# Patient Record
Sex: FEMALE | Race: WHITE | Employment: OTHER | URBAN - METROPOLITAN AREA
[De-identification: names, ages, dates, MRNs, and addresses within clinical notes are randomized per-mention and may not be internally consistent; named-entity substitution may affect disease eponyms.]

---

## 2020-11-19 ENCOUNTER — HOSPITAL ENCOUNTER (OUTPATIENT)
Age: 70
Setting detail: SPECIMEN
Discharge: HOME | End: 2020-11-19
Payer: COMMERCIAL

## 2020-11-20 LAB
IGA: 281 MG/DL (ref 70–400)
TISSUE TRANSGLUTAMINASE ANTIBODY IGG: 0.7 U/ML
TISSUE TRANSGLUTAMINASE IGA: 0.6 U/ML

## 2022-02-22 ENCOUNTER — OFFICE VISIT (OUTPATIENT)
Dept: FAMILY MEDICINE CLINIC | Age: 72
End: 2022-02-22
Payer: MEDICARE

## 2022-02-22 VITALS
RESPIRATION RATE: 16 BRPM | TEMPERATURE: 98.2 F | SYSTOLIC BLOOD PRESSURE: 126 MMHG | BODY MASS INDEX: 24.14 KG/M2 | DIASTOLIC BLOOD PRESSURE: 74 MMHG | WEIGHT: 131.2 LBS | HEIGHT: 62 IN | HEART RATE: 61 BPM | OXYGEN SATURATION: 98 %

## 2022-02-22 DIAGNOSIS — H40.9 GLAUCOMA OF BOTH EYES, UNSPECIFIED GLAUCOMA TYPE: ICD-10-CM

## 2022-02-22 DIAGNOSIS — I10 PRIMARY HYPERTENSION: ICD-10-CM

## 2022-02-22 DIAGNOSIS — E78.2 MIXED HYPERLIPIDEMIA: ICD-10-CM

## 2022-02-22 DIAGNOSIS — M19.042 ARTHRITIS OF BOTH HANDS: ICD-10-CM

## 2022-02-22 DIAGNOSIS — Z91.81 AT HIGH RISK FOR FALLS: ICD-10-CM

## 2022-02-22 DIAGNOSIS — Z87.891 HISTORY OF TOBACCO ABUSE: ICD-10-CM

## 2022-02-22 DIAGNOSIS — M19.041 ARTHRITIS OF BOTH HANDS: ICD-10-CM

## 2022-02-22 DIAGNOSIS — N18.9 CHRONIC KIDNEY DISEASE, UNSPECIFIED CKD STAGE: ICD-10-CM

## 2022-02-22 DIAGNOSIS — Z11.59 NEED FOR HEPATITIS C SCREENING TEST: ICD-10-CM

## 2022-02-22 DIAGNOSIS — J45.30 MILD PERSISTENT ASTHMA WITHOUT COMPLICATION: Primary | ICD-10-CM

## 2022-02-22 PROCEDURE — 1123F ACP DISCUSS/DSCN MKR DOCD: CPT | Performed by: NURSE PRACTITIONER

## 2022-02-22 PROCEDURE — 4040F PNEUMOC VAC/ADMIN/RCVD: CPT | Performed by: NURSE PRACTITIONER

## 2022-02-22 PROCEDURE — 1036F TOBACCO NON-USER: CPT | Performed by: NURSE PRACTITIONER

## 2022-02-22 PROCEDURE — G8484 FLU IMMUNIZE NO ADMIN: HCPCS | Performed by: NURSE PRACTITIONER

## 2022-02-22 PROCEDURE — G8400 PT W/DXA NO RESULTS DOC: HCPCS | Performed by: NURSE PRACTITIONER

## 2022-02-22 PROCEDURE — G8420 CALC BMI NORM PARAMETERS: HCPCS | Performed by: NURSE PRACTITIONER

## 2022-02-22 PROCEDURE — 3017F COLORECTAL CA SCREEN DOC REV: CPT | Performed by: NURSE PRACTITIONER

## 2022-02-22 PROCEDURE — 99204 OFFICE O/P NEW MOD 45 MIN: CPT | Performed by: NURSE PRACTITIONER

## 2022-02-22 PROCEDURE — G8427 DOCREV CUR MEDS BY ELIG CLIN: HCPCS | Performed by: NURSE PRACTITIONER

## 2022-02-22 PROCEDURE — 1090F PRES/ABSN URINE INCON ASSESS: CPT | Performed by: NURSE PRACTITIONER

## 2022-02-22 RX ORDER — BUDESONIDE 3 MG/1
CAPSULE, COATED PELLETS ORAL
COMMUNITY
Start: 2022-02-07

## 2022-02-22 RX ORDER — OMEPRAZOLE 20 MG/1
CAPSULE, DELAYED RELEASE ORAL
COMMUNITY
Start: 2022-01-17

## 2022-02-22 RX ORDER — PRIMIDONE 50 MG/1
TABLET ORAL
COMMUNITY
Start: 2022-01-20

## 2022-02-22 RX ORDER — TAFLUPROST 0 MG/.3ML
1 SOLUTION/ DROPS OPHTHALMIC NIGHTLY
COMMUNITY

## 2022-02-22 RX ORDER — LISINOPRIL AND HYDROCHLOROTHIAZIDE 12.5; 1 MG/1; MG/1
TABLET ORAL
COMMUNITY
Start: 2021-11-29

## 2022-02-22 RX ORDER — NETARSUDIL 0.2 MG/ML
1 SOLUTION/ DROPS OPHTHALMIC; TOPICAL DAILY
COMMUNITY

## 2022-02-22 RX ORDER — TRIAMCINOLONE ACETONIDE 0.25 MG/G
CREAM TOPICAL
Qty: 80 G | Refills: 0 | Status: CANCELLED | OUTPATIENT
Start: 2022-02-22

## 2022-02-22 SDOH — ECONOMIC STABILITY: TRANSPORTATION INSECURITY
IN THE PAST 12 MONTHS, HAS LACK OF TRANSPORTATION KEPT YOU FROM MEETINGS, WORK, OR FROM GETTING THINGS NEEDED FOR DAILY LIVING?: NO

## 2022-02-22 SDOH — ECONOMIC STABILITY: FOOD INSECURITY: WITHIN THE PAST 12 MONTHS, THE FOOD YOU BOUGHT JUST DIDN'T LAST AND YOU DIDN'T HAVE MONEY TO GET MORE.: NEVER TRUE

## 2022-02-22 SDOH — ECONOMIC STABILITY: FOOD INSECURITY: WITHIN THE PAST 12 MONTHS, YOU WORRIED THAT YOUR FOOD WOULD RUN OUT BEFORE YOU GOT MONEY TO BUY MORE.: NEVER TRUE

## 2022-02-22 SDOH — ECONOMIC STABILITY: TRANSPORTATION INSECURITY
IN THE PAST 12 MONTHS, HAS THE LACK OF TRANSPORTATION KEPT YOU FROM MEDICAL APPOINTMENTS OR FROM GETTING MEDICATIONS?: NO

## 2022-02-22 ASSESSMENT — PATIENT HEALTH QUESTIONNAIRE - PHQ9
SUM OF ALL RESPONSES TO PHQ QUESTIONS 1-9: 0
SUM OF ALL RESPONSES TO PHQ9 QUESTIONS 1 & 2: 0
SUM OF ALL RESPONSES TO PHQ QUESTIONS 1-9: 0
SUM OF ALL RESPONSES TO PHQ QUESTIONS 1-9: 0
1. LITTLE INTEREST OR PLEASURE IN DOING THINGS: 0
2. FEELING DOWN, DEPRESSED OR HOPELESS: 0
SUM OF ALL RESPONSES TO PHQ QUESTIONS 1-9: 0

## 2022-02-22 ASSESSMENT — ENCOUNTER SYMPTOMS
DIARRHEA: 0
CHEST TIGHTNESS: 0
RHINORRHEA: 0
VOMITING: 0
EYE DISCHARGE: 0
CONSTIPATION: 0
COUGH: 0
COLOR CHANGE: 1
ABDOMINAL PAIN: 1
SHORTNESS OF BREATH: 0

## 2022-02-22 ASSESSMENT — SOCIAL DETERMINANTS OF HEALTH (SDOH): HOW HARD IS IT FOR YOU TO PAY FOR THE VERY BASICS LIKE FOOD, HOUSING, MEDICAL CARE, AND HEATING?: NOT HARD AT ALL

## 2022-02-22 NOTE — PROGRESS NOTES
Veronica Neighbours [address] [facility], [facility] [place]. [facility] [address]  Dept: [phone]  Dept Fax: [phone]    Visit type: New patient    Reason for Visit: Establish Care (New patient- Prevously seen Petr Kennedy) and Pruritis (on palms of hands- anytime hands are exposed to water- ice helps relieve itching)         Assessment and Plan       1. Mild persistent asthma without complication   Controlled with singulair, has not needed inhaler   2. Chronic kidney disease, unspecified CKD stage   Due for labs  3. Mixed hyperlipidemia   Due for labs, is not taking a statin   -     CBC with Auto Differential; Future  -     Comprehensive Metabolic Panel, Fasting; Future  -     Hemoglobin A1C; Future  -     Lipid Panel; Future  -     Magnesium; Future  -     Vitamin D 25 Hydroxy; Future  -     TSH with Reflex; Future  4. Primary hypertension   Controlled, continue current medication   -     CBC with Auto Differential; Future  -     Comprehensive Metabolic Panel, Fasting; Future  -     Hemoglobin A1C; Future  -     Lipid Panel; Future  -     Magnesium; Future  -     Vitamin D 25 Hydroxy; Future  -     TSH with Reflex; Future  5. Need for hepatitis C screening test  -     Hepatitis C Antibody; Future  6. At high risk for falls  7. Arthritis of both hands   Concern rarely happens and is resolved with ice, discusse alternative diagnosis such as raynauds   8. Glaucoma of both eyes, unspecified glaucoma type   Is following with speciliast  9. History of tobacco abuse  10. Colitis   Is following with Ger[facility]    Signed ROLANDO need to get mammogram and colonoscopy on file as well as vaccinations from [facility]      Return in about 6 months (around 8/22/2022) for AWV .        Subjective       Here to establish care  Previous PCP = korey    Occupation - retired teaching, [place] and . Mani Kang 17 in [address] and veronica Johnson - takes part in that    Is going to try out for the [employer] family     Asthma  Onset for constipation, diarrhea (resolved, but does have frequency) and vomiting. Genitourinary: Negative for difficulty urinating and hematuria. Musculoskeletal: Positive for arthralgias and myalgias. Skin: Positive for color change (intermittent to palms of hands). Negative for rash. Neurological: Negative for dizziness, weakness, numbness and headaches. Psychiatric/Behavioral: The patient is not nervous/anxious. Allergies   Allergen Reactions    Keflex [Cephalexin] Hives    Theophyllines Nausea And Vomiting       Outpatient Medications Prior to Visit   Medication Sig Dispense Refill    budesonide (ENTOCORT EC) 3 MG extended release capsule       lisinopril-hydroCHLOROthiazide (PRINZIDE;ZESTORETIC) 10-12.5 MG per tablet       omeprazole (PRILOSEC) 20 MG delayed release capsule       primidone (MYSOLINE) 50 MG tablet       Tafluprost, PF, (ZIOPTAN) 0.0015 % SOLN Apply 1 drop to eye nightly      Netarsudil Dimesylate (RHOPRESSA) 0.02 % SOLN Apply 1 drop to eye daily Right eye      Omega-3 Fatty Acids (FISH OIL PO) Take by mouth      Multiple Vitamins-Minerals (THERAPEUTIC MULTIVITAMIN-MINERALS) tablet Take 1 tablet by mouth daily.  montelukast (SINGULAIR) 10 MG tablet Take 10 mg by mouth daily.  dorzolamide (TRUSOPT) 2 % ophthalmic solution Place 1 drop into both eyes 2 times daily       mometasone-formoterol (DULERA) 200-5 MCG/ACT inhaler Inhale 1 puff into the lungs nightly (Patient not taking: Reported on 2/22/2022)      Albuterol Sulfate (VENTOLIN HFA IN) Inhale  into the lungs 4 times daily as needed. (Patient not taking: Reported on 2/22/2022)      Lysine 500 MG CAPS Take  by mouth daily as needed. (Patient not taking: Reported on 2/22/2022)      latanoprost (XALATAN) 0.005 % ophthalmic solution Place 1 drop into both eyes nightly. (Patient not taking: Reported on 2/22/2022)       No facility-administered medications prior to visit.         Past Medical History:   Diagnosis Date    Asthma     Chronic back pain     Chronic kidney disease     cysts on her lt kidney    Colitis     Glaucoma     2011    Hyperlipidemia     Hypertension         Social History     Tobacco Use    Smoking status: Former Smoker     Packs/day: 0.50     Years: 2.00     Pack years: 1.00     Start date:      Quit date:      Years since quittin.1    Smokeless tobacco: Never Used   Substance Use Topics    Alcohol use: Yes     Comment: daily glass of wine        Past Surgical History:   Procedure Laterality Date    BREAST SURGERY      needle biopsies     SECTION      x2    HYSTERECTOMY      SEPTOPLASTY  2016    TONSILLECTOMY      TURBINATE RESECTION  2016       Family History   Problem Relation Age of Onset    High Cholesterol Mother     Hypertension Father     Stroke Father     Alcohol Abuse Sister         liver and kidney failure       Objective       /74 (Site: Right Upper Arm, Position: Sitting, Cuff Size: Medium Adult) Comment: manual  Pulse 61   Temp 98.2 °F (36.8 °C) (Temporal)   Resp 16   Ht 5' 2.25\" (1.581 m)   Wt 131 lb 3.2 oz (59.5 kg)   SpO2 98%   BMI 23.80 kg/m²   Physical Exam  Vitals reviewed. Constitutional:       Appearance: She is well-developed. HENT:      Head: Normocephalic and atraumatic. Right Ear: External ear normal.      Left Ear: External ear normal.   Eyes:      Conjunctiva/sclera: Conjunctivae normal.      Comments: glasses   Neck:      Thyroid: No thyromegaly. Trachea: Trachea normal.   Cardiovascular:      Rate and Rhythm: Normal rate and regular rhythm. Heart sounds: Normal heart sounds. No murmur heard. No friction rub. No gallop. Pulmonary:      Effort: Pulmonary effort is normal.      Breath sounds: Normal breath sounds. Abdominal:      General: Bowel sounds are normal.      Palpations: Abdomen is soft. Tenderness: There is no abdominal tenderness.    Musculoskeletal:         General: Normal range of motion. Cervical back: Normal range of motion and neck supple. No spinous process tenderness. Skin:     General: Skin is warm and dry. Findings: No erythema or rash. Neurological:      Mental Status: She is alert and oriented to person, place, and time. Psychiatric:         Speech: Speech normal.         Behavior: Behavior normal.         Thought Content: Thought content normal.           Data Reviewed and Summarized       Labs:     Imaging/Testing:        SONYA Grossman CNP        On the basis of positive falls risk screening, assessment and plan is as follows: home safety tips provided.

## 2022-04-22 ENCOUNTER — TELEPHONE (OUTPATIENT)
Dept: FAMILY MEDICINE CLINIC | Age: 72
End: 2022-04-22

## 2022-04-22 DIAGNOSIS — B00.1 RECURRENT COLD SORES: Primary | ICD-10-CM

## 2022-04-22 NOTE — TELEPHONE ENCOUNTER
PT. Called clinic in regards to requesting an order for Acyclovir to be ordered for flare ups. Please Advise.

## 2022-04-25 RX ORDER — VALACYCLOVIR HYDROCHLORIDE 1 G/1
2000 TABLET, FILM COATED ORAL 2 TIMES DAILY
Qty: 4 TABLET | Refills: 3 | Status: SHIPPED | OUTPATIENT
Start: 2022-04-25 | End: 2022-08-22

## 2022-06-09 ENCOUNTER — OFFICE VISIT (OUTPATIENT)
Dept: FAMILY MEDICINE CLINIC | Age: 72
End: 2022-06-09
Payer: MEDICARE

## 2022-06-09 VITALS
WEIGHT: 126 LBS | BODY MASS INDEX: 22.86 KG/M2 | OXYGEN SATURATION: 98 % | RESPIRATION RATE: 16 BRPM | SYSTOLIC BLOOD PRESSURE: 125 MMHG | HEART RATE: 70 BPM | DIASTOLIC BLOOD PRESSURE: 74 MMHG | TEMPERATURE: 98.4 F

## 2022-06-09 DIAGNOSIS — J45.21 MILD INTERMITTENT ASTHMATIC BRONCHITIS WITH ACUTE EXACERBATION: Primary | ICD-10-CM

## 2022-06-09 PROBLEM — L85.1 KERATODERMA, ACQUIRED: Status: ACTIVE | Noted: 2018-05-23

## 2022-06-09 PROBLEM — L57.0 ACTINIC KERATOSIS: Status: ACTIVE | Noted: 2020-07-30

## 2022-06-09 PROBLEM — M85.80 OSTEOPENIA: Status: ACTIVE | Noted: 2022-06-09

## 2022-06-09 PROBLEM — H40.9 GLAUCOMA: Status: ACTIVE | Noted: 2022-06-09

## 2022-06-09 PROCEDURE — 1036F TOBACCO NON-USER: CPT | Performed by: NURSE PRACTITIONER

## 2022-06-09 PROCEDURE — G8427 DOCREV CUR MEDS BY ELIG CLIN: HCPCS | Performed by: NURSE PRACTITIONER

## 2022-06-09 PROCEDURE — G8400 PT W/DXA NO RESULTS DOC: HCPCS | Performed by: NURSE PRACTITIONER

## 2022-06-09 PROCEDURE — 99213 OFFICE O/P EST LOW 20 MIN: CPT | Performed by: NURSE PRACTITIONER

## 2022-06-09 PROCEDURE — G8420 CALC BMI NORM PARAMETERS: HCPCS | Performed by: NURSE PRACTITIONER

## 2022-06-09 PROCEDURE — 3017F COLORECTAL CA SCREEN DOC REV: CPT | Performed by: NURSE PRACTITIONER

## 2022-06-09 PROCEDURE — 1090F PRES/ABSN URINE INCON ASSESS: CPT | Performed by: NURSE PRACTITIONER

## 2022-06-09 PROCEDURE — 1123F ACP DISCUSS/DSCN MKR DOCD: CPT | Performed by: NURSE PRACTITIONER

## 2022-06-09 RX ORDER — ALBUTEROL SULFATE 90 UG/1
2 AEROSOL, METERED RESPIRATORY (INHALATION)
COMMUNITY
End: 2022-08-22

## 2022-06-09 RX ORDER — B-COMPLEX WITH VITAMIN C
TABLET ORAL
COMMUNITY
End: 2022-08-22

## 2022-06-09 RX ORDER — METHYLPREDNISOLONE 4 MG/1
TABLET ORAL
Qty: 1 KIT | Refills: 0 | Status: SHIPPED | OUTPATIENT
Start: 2022-06-09 | End: 2022-06-15

## 2022-06-09 RX ORDER — AZITHROMYCIN 250 MG/1
TABLET, FILM COATED ORAL
Qty: 6 TABLET | Refills: 0 | Status: SHIPPED | OUTPATIENT
Start: 2022-06-09 | End: 2022-08-22

## 2022-06-09 RX ORDER — ALBUTEROL SULFATE 90 UG/1
2 AEROSOL, METERED RESPIRATORY (INHALATION) 4 TIMES DAILY PRN
Qty: 18 G | Refills: 0 | Status: SHIPPED | OUTPATIENT
Start: 2022-06-09

## 2022-06-09 RX ORDER — CHLORAL HYDRATE 500 MG
CAPSULE ORAL
COMMUNITY

## 2022-06-09 ASSESSMENT — ENCOUNTER SYMPTOMS
CONSTIPATION: 0
COUGH: 1
DIARRHEA: 0
RHINORRHEA: 0
ABDOMINAL PAIN: 0
VOMITING: 0
CHEST TIGHTNESS: 1
EYE DISCHARGE: 0
SHORTNESS OF BREATH: 0

## 2022-06-09 NOTE — PROGRESS NOTES
[address] [facility] [address]  Dept: [phone]  Dept Fax: [phone]    Visit type: Established patient    Reason for Visit: Cough (nneg for covid ) and Health Maintenance (awv / vaccines / breast cancer screening / dexa )         Assessment and Plan       1. Mild intermittent asthmatic bronchitis with acute exacerbation  -     azithromycin (ZITHROMAX Z-HARISH) 250 MG tablet; 2 tablets day 1 then1 tablet days 2 - 5., Disp-6 tablet, R-0Normal  -     methylPREDNISolone (MEDROL DOSEPACK) 4 MG tablet; Take by mouth., Disp-1 kit, R-0Normal  -     albuterol sulfate HFA (VENTOLIN HFA) 108 (90 Base) MCG/ACT inhaler; Inhale 2 puffs into the lungs 4 times daily as needed for Wheezing, Disp-18 g, R-0Normal      Return if symptoms worsen or fail to improve. Subjective       Cough  Onset weeks ago  Sore throat,   Chest tightness   No wheezing  Hx of asthma- has not had anyu issues for a long time  Is on low dose steroid     Mammogram    Test negative for covid at home        Review of Systems   Constitutional: Negative for activity change, appetite change and fever. HENT: Positive for congestion. Negative for ear pain and rhinorrhea. Eyes: Negative for discharge and visual disturbance. Respiratory: Positive for cough and chest tightness. Negative for shortness of breath. Cardiovascular: Negative for chest pain and palpitations. Gastrointestinal: Negative for abdominal pain, constipation, diarrhea and vomiting. Genitourinary: Negative for difficulty urinating and hematuria. Musculoskeletal: Negative for arthralgias and myalgias. Skin: Negative for rash. Neurological: Negative for dizziness, weakness, numbness and headaches. Psychiatric/Behavioral: The patient is not nervous/anxious.          Allergies   Allergen Reactions    Keflex [Cephalexin] Hives    Theophyllines Nausea And Vomiting       Outpatient Medications Prior to Visit Medication Sig Dispense Refill    Omega-3 Fatty Acids (FISH OIL) 1000 MG CAPS Take by mouth      budesonide (ENTOCORT EC) 3 MG extended release capsule       lisinopril-hydroCHLOROthiazide (PRINZIDE;ZESTORETIC) 10-12.5 MG per tablet       omeprazole (PRILOSEC) 20 MG delayed release capsule       primidone (MYSOLINE) 50 MG tablet       Tafluprost, PF, (ZIOPTAN) 0.0015 % SOLN Apply 1 drop to eye nightly      Netarsudil Dimesylate (RHOPRESSA) 0.02 % SOLN Apply 1 drop to eye daily Right eye      Omega-3 Fatty Acids (FISH OIL PO) Take by mouth      montelukast (SINGULAIR) 10 MG tablet Take 10 mg by mouth daily.  dorzolamide (TRUSOPT) 2 % ophthalmic solution Place 1 drop into both eyes 2 times daily       albuterol sulfate HFA (PROVENTIL;VENTOLIN;PROAIR) 108 (90 Base) MCG/ACT inhaler Inhale 2 puffs into the lungs (Patient not taking: Reported on 2022)      calcium carbonate-vitamin D 600-200 MG-UNIT TABS Take by mouth (Patient not taking: Reported on 2022)      valACYclovir (VALTREX) 1 g tablet Take 2 tablets by mouth 2 times daily (Patient not taking: Reported on 2022) 4 tablet 3    Multiple Vitamins-Minerals (THERAPEUTIC MULTIVITAMIN-MINERALS) tablet Take 1 tablet by mouth daily. (Patient not taking: Reported on 2022)       No facility-administered medications prior to visit.         Past Medical History:   Diagnosis Date    Asthma     Chronic back pain     Chronic kidney disease     cysts on her lt kidney    Colitis     Glaucoma         Hyperlipidemia     Hypertension         Social History     Tobacco Use    Smoking status: Former Smoker     Packs/day: 0.50     Years: 2.00     Pack years: 1.00     Start date:      Quit date:      Years since quittin.4    Smokeless tobacco: Never Used   Substance Use Topics    Alcohol use: Yes     Comment: daily glass of wine        Past Surgical History:   Procedure Laterality Date    BREAST SURGERY      needle biopsies   SECTION      x2    HYSTERECTOMY (CERVIX STATUS UNKNOWN)      SEPTOPLASTY  2016    TONSILLECTOMY      TURBINATE RESECTION  2016       Family History   Problem Relation Age of Onset    High Cholesterol Mother     Hypertension Father     Stroke Father     Alcohol Abuse Sister         liver and kidney failure       Objective       /74   Pulse 70   Temp 98.4 °F (36.9 °C) (Temporal)   Resp 16   Wt 126 lb (57.2 kg)   SpO2 98%   BMI 22.86 kg/m²   Physical Exam  Vitals reviewed. Constitutional:       Appearance: She is well-developed. HENT:      Head: Normocephalic and atraumatic. Right Ear: External ear normal.      Left Ear: External ear normal.   Eyes:      Conjunctiva/sclera: Conjunctivae normal.   Neck:      Thyroid: No thyromegaly. Trachea: Trachea normal.   Cardiovascular:      Rate and Rhythm: Normal rate and regular rhythm. Heart sounds: Normal heart sounds. No murmur heard. No friction rub. No gallop. Pulmonary:      Effort: Pulmonary effort is normal.      Breath sounds: Wheezing present. Abdominal:      General: Bowel sounds are normal.      Palpations: Abdomen is soft. Tenderness: There is no abdominal tenderness. Musculoskeletal:         General: Normal range of motion. Cervical back: Normal range of motion and neck supple. No spinous process tenderness. Skin:     General: Skin is warm and dry. Findings: No erythema or rash. Neurological:      Mental Status: She is alert and oriented to person, place, and time. Psychiatric:         Speech: Speech normal.         Behavior: Behavior normal.         Thought Content:  Thought content normal.           Data Reviewed and Summarized       Labs:     Imaging/Testing:        Yaa Cardoso, APRN - CNP

## 2022-08-15 ENCOUNTER — NURSE ONLY (OUTPATIENT)
Dept: FAMILY MEDICINE CLINIC | Age: 72
End: 2022-08-15
Payer: MEDICARE

## 2022-08-15 ENCOUNTER — HOSPITAL ENCOUNTER (OUTPATIENT)
Age: 72
Setting detail: SPECIMEN
Discharge: HOME | End: 2022-08-15

## 2022-08-15 DIAGNOSIS — E78.2 MIXED HYPERLIPIDEMIA: ICD-10-CM

## 2022-08-15 DIAGNOSIS — I10 PRIMARY HYPERTENSION: ICD-10-CM

## 2022-08-15 DIAGNOSIS — I10 ESSENTIAL HYPERTENSION, MALIGNANT: Primary | ICD-10-CM

## 2022-08-15 DIAGNOSIS — Z11.59 NEED FOR HEPATITIS C SCREENING TEST: ICD-10-CM

## 2022-08-15 LAB
ABSOLUTE EOS #: 0.18 K/UL (ref 0–0.44)
ABSOLUTE IMMATURE GRANULOCYTE: <0.03 K/UL (ref 0–0.3)
ABSOLUTE LYMPH #: 1.99 K/UL (ref 1.1–3.7)
ABSOLUTE MONO #: 0.5 K/UL (ref 0.1–1.2)
BASOPHILS # BLD: 1 % (ref 0–2)
BASOPHILS ABSOLUTE: 0.07 K/UL (ref 0–0.2)
EOSINOPHILS RELATIVE PERCENT: 3 % (ref 1–4)
HCT VFR BLD CALC: 43.7 % (ref 36.3–47.1)
HEMOGLOBIN: 14.3 G/DL (ref 11.9–15.1)
IMMATURE GRANULOCYTES: 0 %
LYMPHOCYTES # BLD: 33 % (ref 24–43)
MCH RBC QN AUTO: 30.6 PG (ref 25.2–33.5)
MCHC RBC AUTO-ENTMCNC: 32.7 G/DL (ref 28.4–34.8)
MCV RBC AUTO: 93.6 FL (ref 82.6–102.9)
MONOCYTES # BLD: 8 % (ref 3–12)
NRBC AUTOMATED: 0 PER 100 WBC
PDW BLD-RTO: 13.3 % (ref 11.8–14.4)
PLATELET # BLD: 299 K/UL (ref 138–453)
PMV BLD AUTO: 11.8 FL (ref 8.1–13.5)
RBC # BLD: 4.67 M/UL (ref 3.95–5.11)
SEG NEUTROPHILS: 55 % (ref 36–65)
SEGMENTED NEUTROPHILS ABSOLUTE COUNT: 3.27 K/UL (ref 1.5–8.1)
WBC # BLD: 6 K/UL (ref 3.5–11.3)

## 2022-08-15 PROCEDURE — 99999 PR OFFICE/OUTPT VISIT,PROCEDURE ONLY: CPT | Performed by: NURSE PRACTITIONER

## 2022-08-15 PROCEDURE — 36415 COLL VENOUS BLD VENIPUNCTURE: CPT | Performed by: NURSE PRACTITIONER

## 2022-08-16 LAB
ALBUMIN SERPL-MCNC: 4.4 G/DL (ref 3.5–5.2)
ALBUMIN/GLOBULIN RATIO: 1.7 (ref 1–2.5)
ALP BLD-CCNC: 51 U/L (ref 35–104)
ALT SERPL-CCNC: 22 U/L (ref 5–33)
ANION GAP SERPL CALCULATED.3IONS-SCNC: 12 MMOL/L (ref 9–17)
AST SERPL-CCNC: 30 U/L
BILIRUB SERPL-MCNC: 0.52 MG/DL (ref 0.3–1.2)
BUN BLDV-MCNC: 14 MG/DL (ref 8–23)
CALCIUM SERPL-MCNC: 9.8 MG/DL (ref 8.6–10.4)
CHLORIDE BLD-SCNC: 103 MMOL/L (ref 98–107)
CHOLESTEROL/HDL RATIO: 4.1
CHOLESTEROL: 269 MG/DL
CO2: 26 MMOL/L (ref 20–31)
CREAT SERPL-MCNC: 0.79 MG/DL (ref 0.5–0.9)
ESTIMATED AVERAGE GLUCOSE: 94 MG/DL
GFR AFRICAN AMERICAN: >60 ML/MIN
GFR NON-AFRICAN AMERICAN: >60 ML/MIN
GFR SERPL CREATININE-BSD FRML MDRD: ABNORMAL ML/MIN/{1.73_M2}
GLUCOSE FASTING: 74 MG/DL (ref 70–99)
HBA1C MFR BLD: 4.9 % (ref 4–6)
HDLC SERPL-MCNC: 66 MG/DL
HEPATITIS C ANTIBODY: NONREACTIVE
LDL CHOLESTEROL: 188 MG/DL (ref 0–130)
MAGNESIUM: 1.8 MG/DL (ref 1.6–2.6)
POTASSIUM SERPL-SCNC: 3.6 MMOL/L (ref 3.7–5.3)
SODIUM BLD-SCNC: 141 MMOL/L (ref 135–144)
TOTAL PROTEIN: 7 G/DL (ref 6.4–8.3)
TRIGL SERPL-MCNC: 73 MG/DL
TSH SERPL DL<=0.05 MIU/L-ACNC: 0.99 UIU/ML (ref 0.3–5)
VITAMIN D 25-HYDROXY: 58.7 NG/ML

## 2022-08-22 ENCOUNTER — ANCILLARY PROCEDURE (OUTPATIENT)
Dept: FAMILY MEDICINE CLINIC | Age: 72
End: 2022-08-22
Payer: MEDICARE

## 2022-08-22 ENCOUNTER — OFFICE VISIT (OUTPATIENT)
Dept: FAMILY MEDICINE CLINIC | Age: 72
End: 2022-08-22
Payer: MEDICARE

## 2022-08-22 VITALS
TEMPERATURE: 97 F | OXYGEN SATURATION: 98 % | BODY MASS INDEX: 23.37 KG/M2 | RESPIRATION RATE: 16 BRPM | WEIGHT: 127 LBS | DIASTOLIC BLOOD PRESSURE: 70 MMHG | SYSTOLIC BLOOD PRESSURE: 132 MMHG | HEART RATE: 64 BPM | HEIGHT: 62 IN

## 2022-08-22 DIAGNOSIS — M25.512 CHRONIC LEFT SHOULDER PAIN: ICD-10-CM

## 2022-08-22 DIAGNOSIS — Z78.0 POSTMENOPAUSAL: ICD-10-CM

## 2022-08-22 DIAGNOSIS — Z00.00 INITIAL MEDICARE ANNUAL WELLNESS VISIT: Primary | ICD-10-CM

## 2022-08-22 DIAGNOSIS — G89.29 CHRONIC LEFT SHOULDER PAIN: ICD-10-CM

## 2022-08-22 DIAGNOSIS — E78.2 MIXED HYPERLIPIDEMIA: ICD-10-CM

## 2022-08-22 PROCEDURE — 73030 X-RAY EXAM OF SHOULDER: CPT | Performed by: NURSE PRACTITIONER

## 2022-08-22 PROCEDURE — G0438 PPPS, INITIAL VISIT: HCPCS | Performed by: NURSE PRACTITIONER

## 2022-08-22 PROCEDURE — 99214 OFFICE O/P EST MOD 30 MIN: CPT | Performed by: NURSE PRACTITIONER

## 2022-08-22 PROCEDURE — 3017F COLORECTAL CA SCREEN DOC REV: CPT | Performed by: NURSE PRACTITIONER

## 2022-08-22 PROCEDURE — G8400 PT W/DXA NO RESULTS DOC: HCPCS | Performed by: NURSE PRACTITIONER

## 2022-08-22 PROCEDURE — G8420 CALC BMI NORM PARAMETERS: HCPCS | Performed by: NURSE PRACTITIONER

## 2022-08-22 PROCEDURE — 1123F ACP DISCUSS/DSCN MKR DOCD: CPT | Performed by: NURSE PRACTITIONER

## 2022-08-22 PROCEDURE — 73030 X-RAY EXAM OF SHOULDER: CPT

## 2022-08-22 PROCEDURE — 1090F PRES/ABSN URINE INCON ASSESS: CPT | Performed by: NURSE PRACTITIONER

## 2022-08-22 PROCEDURE — G8427 DOCREV CUR MEDS BY ELIG CLIN: HCPCS | Performed by: NURSE PRACTITIONER

## 2022-08-22 PROCEDURE — 1036F TOBACCO NON-USER: CPT | Performed by: NURSE PRACTITIONER

## 2022-08-22 RX ORDER — ATORVASTATIN CALCIUM 10 MG/1
10 TABLET, FILM COATED ORAL DAILY
Qty: 30 TABLET | Refills: 2 | Status: SHIPPED | OUTPATIENT
Start: 2022-08-22 | End: 2022-09-27 | Stop reason: SDUPTHER

## 2022-08-22 RX ORDER — ATORVASTATIN CALCIUM 10 MG/1
10 TABLET, FILM COATED ORAL DAILY
Qty: 30 TABLET | Refills: 2 | Status: SHIPPED | OUTPATIENT
Start: 2022-08-22 | End: 2022-08-22 | Stop reason: SDUPTHER

## 2022-08-22 ASSESSMENT — PATIENT HEALTH QUESTIONNAIRE - PHQ9
2. FEELING DOWN, DEPRESSED OR HOPELESS: 0
SUM OF ALL RESPONSES TO PHQ QUESTIONS 1-9: 0
SUM OF ALL RESPONSES TO PHQ9 QUESTIONS 1 & 2: 0
1. LITTLE INTEREST OR PLEASURE IN DOING THINGS: 0
SUM OF ALL RESPONSES TO PHQ QUESTIONS 1-9: 0

## 2022-08-22 NOTE — PATIENT INSTRUCTIONS
Personalized Preventive Plan for Kristi Fearing - 8/22/2022  Medicare offers a range of preventive health benefits. Some of the tests and screenings are paid in full while other may be subject to a deductible, co-insurance, and/or copay. Some of these benefits include a comprehensive review of your medical history including lifestyle, illnesses that may run in your family, and various assessments and screenings as appropriate. After reviewing your medical record and screening and assessments performed today your provider may have ordered immunizations, labs, imaging, and/or referrals for you. A list of these orders (if applicable) as well as your Preventive Care list are included within your After Visit Summary for your review. Other Preventive Recommendations:    A preventive eye exam performed by an eye specialist is recommended every 1-2 years to screen for glaucoma; cataracts, macular degeneration, and other eye disorders. A preventive dental visit is recommended every 6 months. Try to get at least 150 minutes of exercise per week or 10,000 steps per day on a pedometer . Order or download the FREE \"Exercise & Physical Activity: Your Everyday Guide\" from [employer]. Call 4-134.878.3242 or search [employer] online. You need 3983-9959 mg of calcium and 5983-8923 IU of vitamin D per day. It is possible to meet your calcium requirement with diet alone, but a vitamin D supplement is usually necessary to meet this goal.  When exposed to the sun, use a sunscreen that protects against both UVA and UVB radiation with an SPF of 30 or greater. Reapply every 2 to 3 hours or after sweating, drying off with a towel, or swimming. Always wear a seat belt when traveling in a car. Always wear a helmet when riding a bicycle or motorcycle.

## 2022-08-22 NOTE — PROGRESS NOTES
Hemoglobin A1C  Order: [id number]  Status: Final result    Visible to patient: Yes (seen)    Dx: Mixed hyperlipidemia; Primary hyperte. ..    0 Result Notes  Component Ref Range & Units 7 d ago    Hemoglobin A1C 4.0 - 6.0 % 4.9    Estimated Avg Glucose mg/dL 94    Comment: The ADA and AACC recommend providing the estimated average glucose result to permit better   patient understanding of their HBA1c result. [address] [facility]              Specimen Collected: 08/15/22 21:24 EDT Last Resulted: 08/16/22 14:10 EDT        Lab Flowsheet     Order Details     View Encounter     Lab and Collection Details     Routing     Result History     View Encounter Conversation           Result Care Coordination      Patient Communication     Add Comments   Seen Back to Top           TSH with Reflex  Order: [id number]  Status: Final result    Visible to patient: Yes (seen)    Dx: Mixed hyperlipidemia; Primary hyperte. ..    0 Result Notes  Component Ref Range & Units 7 d ago    TSH 0.30 - 5.00 uIU/mL 0.99    [address] [facility]              Specimen Collected: 08/15/22 21:24 EDT Last Resulted: 08/16/22 01:10 EDT        Lab Flowsheet     Order Details     View Encounter     Lab and Collection Details     Routing     Result History     View Encounter Conversation           Result Care Coordination      Patient Communication     Add Comments   Seen Back to Top           Vitamin D 25 Hydroxy  Order: [id number]  Status: Final result    Visible to patient: Yes (seen)    Dx: Mixed hyperlipidemia; Primary hyperte. ..    0 Result Notes  Component Ref Range & Units 7 d ago    Vit D, 25-Hydroxy >29.9 ng/mL 58.7    Comment:     Reference Range:   Vitamin D status         Range    Deficiency              <20 ng/mL    Mild Deficiency       20-30 ng/mL    Sufficiency           ng/mL    Toxicity               >100 ng/mL    Resulting Agency  [facility] Collected: 08/15/22 21:24 EDT Last Resulted: 08/16/22 01:10 EDT        Lab Flowsheet     Order Details     View Encounter     Lab and Collection Details     Routing     Result History     View Encounter Conversation           Result Care Coordination      Patient Communication     Add Comments   Seen Back to Top           Magnesium  Order: [id number]  Status: Final result    Visible to patient: Yes (seen)    Dx: Mixed hyperlipidemia; Primary hyperte. ..    0 Result Notes  Component Ref Range & Units 7 d ago    Magnesium 1.6 - 2.6 mg/dL 1.8    Resulting [place]              Specimen Collected: 08/15/22 21:24 EDT Last Resulted: 08/16/22 01:10 EDT        Lab Flowsheet     Order Details     View Encounter     Lab and Collection Details     Routing     Result History     View Encounter Conversation           Result Care Coordination      Patient Communication     Add Comments   Seen Back to Top            Contains abnormal data Lipid Panel  Order: [id number]  Status: Final result    Visible to patient: Yes (seen)    Dx: Mixed hyperlipidemia; Primary hyperte. ..    0 Result Notes    1 HM Topic  Component Ref Range & Units 7 d ago    Cholesterol <200 mg/dL 269 High     Comment:     Cholesterol Guidelines:       <200  Desirable    200-240  Borderline       >240  Undesirable        HDL >40 mg/dL 66    Comment:     HDL Guidelines:     <40     Undesirable    40-59    Borderline     >59     Desirable        LDL Cholesterol 0 - 130 mg/dL 188 High     Comment:     LDL Guidelines:      <100    Desirable    100-129   Near to/above Desirable    130-159   Borderline       >159   Undesirable       Direct (measured) LDL and calculated LDL are not interchangeable tests.     Chol/HDL Ratio <5 4.1    Comment:        Triglycerides <150 mg/dL 73    Comment:     Triglyceride Guidelines:      <150   Desirable    150-199  Borderline    200-499  High      >499   Very high    Based on AHA Guidelines for fasting triglyceride, October 2012. [address] [facility]              Specimen Collected: 08/15/22 21:24 EDT Last Resulted: 08/16/22 01:10 EDT        Lab Flowsheet     Order Details     View Encounter     Lab and Collection Details     Routing     Result History     View Encounter Conversation           Result Care Coordination      Patient Communication     Add Comments   Seen Back to Top          Satisfied Health Maintenance Topics       Back to Top  Lipids (Every 5 Years)  Next due on 8/15/2027  Address Topic            Contains abnormal data Comprehensive Metabolic Panel, Fasting  Order: [id number]  Status: Final result    Visible to patient: Yes (seen)    Dx: Mixed hyperlipidemia; Primary hyperte. ..    0 Result Notes  Component Ref Range & Units 7 d ago   (8/15/22) 6 yr ago   (7/7/16) 6 yr ago   (7/7/16)   Glucose, Fasting 70 - 99 mg/dL 74      BUN 8 - 23 mg/dL 14   15 R    Creatinine 0.50 - 0.90 mg/dL 0.79   0.9 R    Calcium 8.6 - 10.4 mg/dL 9.8   10.0 R    Sodium 135 - 144 mmol/L 141   144 R    Potassium 3.7 - 5.3 mmol/L 3.6 Low    3.5 R    Chloride 98 - 107 mmol/L 103   103 R    CO2 20 - 31 mmol/L 26   26 R    Anion Gap 9 - 17 mmol/L 12  15.0 R, CM     Alkaline Phosphatase 35 - 104 U/L 51   69 R    ALT 5 - 33 U/L 22   30 R, CM    AST <32 U/L 30   23 R    Total Bilirubin 0.3 - 1.2 mg/dL 0.52   0.5 R    Total Protein 6.4 - 8.3 g/dL 7.0   7.6 R    Albumin 3.5 - 5.2 g/dL 4.4   4.5 R    Albumin/Globulin Ratio 1.0 - 2.5 1.7      GFR Non-African American >60 mL/min >60      GFR  >60 mL/min >60      GFR Comment            Comment: Average GFR for 79or more years old:    76 mL/min/1.73sq m   Chronic Kidney Disease:    <60 mL/min/1.73sq m   Kidney failure:    <15 mL/min/1.73sq m               eGFR calculated using average adult body mass.  Additional eGFR calculator available at:         [email or web address]          [address] [facility] [address] Lab              Specimen Collected: 08/15/22 21:24 EDT Last Resulted: 08/16/22 01:10 EDT        Lab Flowsheet     Order Details     View Encounter     Lab and Collection Details     Routing     Result History     View Encounter Conversation        CM=Additional comments  R=Reference range differs from displayed range        Result Care Coordination      Patient Communication     Add Comments   Seen Back to Top           Hepatitis C Antibody  Order: [id number]  Status: Final result    Visible to patient: Yes (seen)    Dx: Need for hepatitis C screening test    0 Result Notes    1 HM Topic  Component Ref Range & Units 7 d ago    Hepatitis C Ab NONREACTIVE NONREACTIVE    Comment:        The hepatitis C procedure used in our laboratory is a Chemiluminescent test specific for   three recombinant HCV antigens. A negative anti-HCV result indicates that the antibodies to    hepatitis C virus are not present at this time. Individuals with reactive anti-HCV should be considered infected and infectious until proven    otherwise. Confirmation of all equivocal or reactive results is recommended by ordering   HCV RNA by PCR. [address] [place] [address]              Specimen Collected: 08/15/22 21:24 EDT Last Resulted: 08/16/22 00:11 EDT        Lab Flowsheet     Order Details     View Encounter     Lab and Collection Details     Routing     Result History     View Encounter Conversation           Result Care Coordination      Patient Communication     Add Comments   Seen Back to Top          Satisfied Health Maintenance Topics       Back to Top  Hepatitis C screen   Completed  Address Topic           CBC with Auto Differential  Order: [id number]  Status: Final result    Visible to patient: Yes (seen)    Dx: Mixed hyperlipidemia; Primary hyperte. ..    0 Result Notes  Component Ref Range & Units 7 d ago 6 yr ago   WBC 3.5 - 11.3 k/uL 6.0  7.2 R    RBC 3.95 - 5.11 m/uL 4.67  5.12 R    Hemoglobin 11.9 - 15.1 g/dL 14.3  14.6 R    Hematocrit 36.3 - 47.1 % 43.7  43.3 R    MCV 82.6 - 102.9 fL 93.6  84.6 R    MCH 25.2 - 33.5 pg 30.6  28.5 R    MCHC 28.4 - 34.8 g/dL 32.7  33.7 R    RDW 11.8 - 14.4 % 13.3  15.5 High  R    Platelets 932 - 130 k/uL 299  310 R    MPV 8.1 - 13.5 fL 11.8  8.5 R, CM    NRBC Automated 0.0 per 100 WBC 0.0     Seg Neutrophils 36 - 65 % 55     Lymphocytes 24 - 43 % 33     Monocytes 3 - 12 % 8     Eosinophils % 1 - 4 % 3     Basophils 0 - 2 % 1     Immature Granulocytes 0 % 0     Segs Absolute 1.50 - 8.10 k/uL 3.27     Absolute Lymph # 1.10 - 3.70 k/uL 1.99     Absolute Mono # 0.10 - 1.20 k/uL 0.50     Absolute Eos # 0.00 - 0.44 k/uL 0.18     Basophils Absolute 0.00 - 0.20 k/uL 0.07     Absolute Immature Granulocyte 0.00 - 0.30 k/uL <0.03     Resulting [address] Lab              Specimen Collected: 08/15/22 21:24 EDT Last Resulted: 08/15/22 23:03 EDT        Lab Flowsheet     Order Details     View Encounter     Lab and Collection Details     Routing     Result History     View Encounter Conversation        CM=Additional comments  R=Reference range differs from displayed range        Result Care Coordination      Patient Communication     Add Comments   Seen           Medicare Annual Wellness Visit    Fred Sena is here for Medicare AWV (yearly), Health Maintenance (Vaccines/Dexa ), and Shoulder Pain (L shoulder radiating down arm /States this has been going on for a long time )    Dexa scan- had completed 15 years ago and was given fosamax - due for repeat scan   High potassium foods, is taking ACE so consider supplement if still low at fu visit   Is going to get records from [facility] of shingles and pneumonia, is going to get TDAP at pharmacy   Shoulder xray today- consider injection at fu apt of steroids, vs adding PT and ortho consult   Assessment & Plan   Initial Medicare annual wellness visit    Recommendations for Preventive Services Due: see orders and patient instructions/AVS.  Recommended screening schedule for the next 5-10 years is provided to the patient in written form: see Patient Instructions/AVS.     Return for Medicare Annual Wellness Visit in 1 year. Subjective       Patient's complete Health Risk Assessment and screening values have been reviewed and are found in Flowsheets. The following problems were reviewed today and where indicated follow up appointments were made and/or referrals ordered. Left shoulder pain  Onset years ago  Has gotten worse recently   Has not been able to fasten her bra strap and has had decreased ROM    Hyperlipidemia  Onset 2022  Is taking fish oil OTC  Not current taking statin medication    HTN  Onset years ago  Taking dual therapy  No CP or palpitaions       Positive Risk Factor Screenings with Interventions:    Fall Risk:  Do you feel unsteady or are you worried about falling? : (!) yes  2 or more falls in past year?: no  Fall with injury in past year?: no   Fall Risk Interventions:    Home safety tips provided             Health Habits/Nutrition:  Physical Activity: Inactive    Days of Exercise per Week: 0 days    Minutes of Exercise per Session: 0 min     Have you lost any weight without trying in the past 3 months?: No  Body mass index: 23.23  Have you seen the dentist within the past year?: Yes  Health Habits/Nutrition Interventions:  No intervention needed     Hearing/Vision:  Do you or your family notice any trouble with your hearing that hasn't been managed with hearing aids?: (!) Yes  Do you have difficulty driving, watching TV, or doing any of your daily activities because of your eyesight?: (!) Yes  Have you had an eye exam within the past year?: Yes  No results found.   Hearing/Vision Interventions:  Hearing concerns:  will let me know if she is wanting this completed   Vision concerns:  encouraged to make eye apt s             Objective

## 2022-08-30 ENCOUNTER — OFFICE VISIT (OUTPATIENT)
Dept: FAMILY MEDICINE CLINIC | Age: 72
End: 2022-08-30
Payer: MEDICARE

## 2022-08-30 VITALS
TEMPERATURE: 96.8 F | RESPIRATION RATE: 16 BRPM | OXYGEN SATURATION: 99 % | HEART RATE: 65 BPM | DIASTOLIC BLOOD PRESSURE: 82 MMHG | WEIGHT: 127 LBS | SYSTOLIC BLOOD PRESSURE: 116 MMHG | BODY MASS INDEX: 23.23 KG/M2

## 2022-08-30 DIAGNOSIS — I10 ESSENTIAL HYPERTENSION: ICD-10-CM

## 2022-08-30 DIAGNOSIS — G89.29 CHRONIC LEFT SHOULDER PAIN: Primary | ICD-10-CM

## 2022-08-30 DIAGNOSIS — E78.2 MIXED HYPERLIPIDEMIA: ICD-10-CM

## 2022-08-30 DIAGNOSIS — M25.512 CHRONIC LEFT SHOULDER PAIN: Primary | ICD-10-CM

## 2022-08-30 PROCEDURE — G8427 DOCREV CUR MEDS BY ELIG CLIN: HCPCS | Performed by: NURSE PRACTITIONER

## 2022-08-30 PROCEDURE — 1123F ACP DISCUSS/DSCN MKR DOCD: CPT | Performed by: NURSE PRACTITIONER

## 2022-08-30 PROCEDURE — 99214 OFFICE O/P EST MOD 30 MIN: CPT | Performed by: NURSE PRACTITIONER

## 2022-08-30 PROCEDURE — 1090F PRES/ABSN URINE INCON ASSESS: CPT | Performed by: NURSE PRACTITIONER

## 2022-08-30 PROCEDURE — 3017F COLORECTAL CA SCREEN DOC REV: CPT | Performed by: NURSE PRACTITIONER

## 2022-08-30 PROCEDURE — G8400 PT W/DXA NO RESULTS DOC: HCPCS | Performed by: NURSE PRACTITIONER

## 2022-08-30 PROCEDURE — 20610 DRAIN/INJ JOINT/BURSA W/O US: CPT | Performed by: NURSE PRACTITIONER

## 2022-08-30 PROCEDURE — 1036F TOBACCO NON-USER: CPT | Performed by: NURSE PRACTITIONER

## 2022-08-30 PROCEDURE — G8420 CALC BMI NORM PARAMETERS: HCPCS | Performed by: NURSE PRACTITIONER

## 2022-08-30 RX ORDER — TRIAMCINOLONE ACETONIDE 40 MG/ML
40 INJECTION, SUSPENSION INTRA-ARTICULAR; INTRAMUSCULAR ONCE
Status: COMPLETED | OUTPATIENT
Start: 2022-08-30 | End: 2022-08-30

## 2022-08-30 RX ADMIN — TRIAMCINOLONE ACETONIDE 40 MG: 40 INJECTION, SUSPENSION INTRA-ARTICULAR; INTRAMUSCULAR at 10:47

## 2022-08-30 ASSESSMENT — ENCOUNTER SYMPTOMS
VOMITING: 0
RHINORRHEA: 0
DIARRHEA: 0
EYE DISCHARGE: 0
CHEST TIGHTNESS: 0
CONSTIPATION: 0
SHORTNESS OF BREATH: 0
COUGH: 0
ABDOMINAL PAIN: 0

## 2022-08-30 ASSESSMENT — PATIENT HEALTH QUESTIONNAIRE - PHQ9
SUM OF ALL RESPONSES TO PHQ QUESTIONS 1-9: 0
2. FEELING DOWN, DEPRESSED OR HOPELESS: 0
SUM OF ALL RESPONSES TO PHQ9 QUESTIONS 1 & 2: 0
1. LITTLE INTEREST OR PLEASURE IN DOING THINGS: 0
SUM OF ALL RESPONSES TO PHQ QUESTIONS 1-9: 0

## 2022-08-30 NOTE — PROGRESS NOTES
difficulty urinating and hematuria. Musculoskeletal:  Positive for arthralgias and myalgias. Skin:  Negative for rash. Neurological:  Negative for dizziness, weakness, numbness and headaches. Psychiatric/Behavioral:  The patient is not nervous/anxious. Allergies   Allergen Reactions    Keflex [Cephalexin] Hives    Theophyllines Nausea And Vomiting       Outpatient Medications Prior to Visit   Medication Sig Dispense Refill    atorvastatin (LIPITOR) 10 MG tablet Take 1 tablet by mouth daily 30 tablet 2    Omega-3 Fatty Acids (FISH OIL) 1000 MG CAPS Take by mouth      albuterol sulfate HFA (VENTOLIN HFA) 108 (90 Base) MCG/ACT inhaler Inhale 2 puffs into the lungs 4 times daily as needed for Wheezing 18 g 0    budesonide (ENTOCORT EC) 3 MG extended release capsule       lisinopril-hydroCHLOROthiazide (PRINZIDE;ZESTORETIC) 10-12.5 MG per tablet       omeprazole (PRILOSEC) 20 MG delayed release capsule       primidone (MYSOLINE) 50 MG tablet       Tafluprost, PF, (ZIOPTAN) 0.0015 % SOLN Apply 1 drop to eye nightly      Netarsudil Dimesylate (RHOPRESSA) 0.02 % SOLN Apply 1 drop to eye daily Right eye      montelukast (SINGULAIR) 10 MG tablet Take 10 mg by mouth daily. dorzolamide (TRUSOPT) 2 % ophthalmic solution Place 1 drop into both eyes 2 times daily        No facility-administered medications prior to visit.         Past Medical History:   Diagnosis Date    Asthma     Chronic back pain     Chronic kidney disease     cysts on her lt kidney    Colitis     Glaucoma         Hyperlipidemia     Hypertension         Social History     Tobacco Use    Smoking status: Former     Packs/day: 0.50     Years: 2.00     Pack years: 1.00     Types: Cigarettes     Start date:      Quit date:      Years since quittin.6    Smokeless tobacco: Never   Substance Use Topics    Alcohol use: Yes     Comment: daily glass of wine        Past Surgical History:   Procedure Laterality Date    BREAST SURGERY needle biopsies     SECTION      x2    HYSTERECTOMY (CERVIX STATUS UNKNOWN)      SEPTOPLASTY  2016    TONSILLECTOMY      TURBINATE RESECTION  2016       Family History   Problem Relation Age of Onset    High Cholesterol Mother     Hypertension Father     Stroke Father     Alcohol Abuse Sister         liver and kidney failure       Objective       /82   Pulse 65   Temp 96.8 °F (36 °C) (Temporal)   Resp 16   Wt 127 lb (57.6 kg)   SpO2 99%   BMI 23.23 kg/m²   Physical Exam  Vitals reviewed. Constitutional:       Appearance: She is well-developed. HENT:      Head: Normocephalic and atraumatic. Right Ear: External ear normal.      Left Ear: External ear normal.   Eyes:      Conjunctiva/sclera: Conjunctivae normal.   Neck:      Thyroid: No thyromegaly. Trachea: Trachea normal.   Cardiovascular:      Rate and Rhythm: Normal rate and regular rhythm. Heart sounds: Normal heart sounds. No murmur heard. No friction rub. No gallop. Pulmonary:      Effort: Pulmonary effort is normal.      Breath sounds: Normal breath sounds. Abdominal:      General: Bowel sounds are normal.      Palpations: Abdomen is soft. Tenderness: There is no abdominal tenderness. Musculoskeletal:         General: Normal range of motion. Left shoulder: Tenderness and bony tenderness present. No swelling, deformity, effusion, laceration or crepitus. Normal range of motion. Decreased strength. Normal pulse. Cervical back: Normal range of motion and neck supple. No spinous process tenderness. Skin:     General: Skin is warm and dry. Findings: No erythema or rash. Neurological:      Mental Status: She is alert and oriented to person, place, and time. Psychiatric:         Speech: Speech normal.         Behavior: Behavior normal.         Thought Content:  Thought content normal.       Data Reviewed and Summarized       Labs:     Imaging/Testing:    No radiation information found for this patient  Narrative   EXAMINATION:   3 XRAY VIEWS OF THE LEFT SHOULDER       8/22/2022 10:19 am       COMPARISON:   None. HISTORY:   ORDERING SYSTEM PROVIDED HISTORY: Chronic left shoulder pain   TECHNOLOGIST PROVIDED HISTORY:   left shoulder pain       FINDINGS:   Humeral head is well circumscribed and situated within the glenoid fossa. Mild acromioclavicular and glenohumeral degenerative changes are noted   without acute fracture, dislocation or effusion. Impression   Mild degenerative changes. No acute osseous abnormality.        Mar June, APRN - CNP

## 2022-09-01 ENCOUNTER — HOSPITAL ENCOUNTER (OUTPATIENT)
Dept: WOMENS IMAGING | Age: 72
Discharge: HOME | End: 2022-09-01
Payer: MEDICARE

## 2022-09-01 DIAGNOSIS — Z78.0 POSTMENOPAUSAL: ICD-10-CM

## 2022-09-01 PROCEDURE — 77080 DXA BONE DENSITY AXIAL: CPT

## 2022-09-26 DIAGNOSIS — E78.2 MIXED HYPERLIPIDEMIA: ICD-10-CM

## 2022-09-27 RX ORDER — ATORVASTATIN CALCIUM 10 MG/1
10 TABLET, FILM COATED ORAL DAILY
Qty: 30 TABLET | Refills: 2 | Status: SHIPPED | OUTPATIENT
Start: 2022-09-27

## 2022-11-16 RX ORDER — LISINOPRIL AND HYDROCHLOROTHIAZIDE 12.5; 1 MG/1; MG/1
1 TABLET ORAL DAILY
Qty: 30 TABLET | Refills: 3 | Status: SHIPPED | OUTPATIENT
Start: 2022-11-16 | End: 2023-03-16

## 2022-11-16 RX ORDER — MONTELUKAST SODIUM 10 MG/1
10 TABLET ORAL DAILY
Qty: 30 TABLET | Refills: 3 | Status: SHIPPED | OUTPATIENT
Start: 2022-11-16

## 2022-12-21 ENCOUNTER — NURSE ONLY (OUTPATIENT)
Dept: FAMILY MEDICINE CLINIC | Age: 72
End: 2022-12-21
Payer: MEDICARE

## 2022-12-21 ENCOUNTER — HOSPITAL ENCOUNTER (OUTPATIENT)
Age: 72
Setting detail: SPECIMEN
Discharge: HOME | End: 2022-12-21

## 2022-12-21 DIAGNOSIS — Z79.899 NEED FOR PROPHYLACTIC CHEMOTHERAPY: Primary | ICD-10-CM

## 2022-12-21 LAB
ABSOLUTE EOS #: 0.2 K/UL (ref 0–0.44)
ABSOLUTE IMMATURE GRANULOCYTE: 0.04 K/UL (ref 0–0.3)
ABSOLUTE LYMPH #: 2.39 K/UL (ref 1.1–3.7)
ABSOLUTE MONO #: 0.72 K/UL (ref 0.1–1.2)
BASOPHILS # BLD: 1 % (ref 0–2)
BASOPHILS ABSOLUTE: 0.07 K/UL (ref 0–0.2)
EOSINOPHILS RELATIVE PERCENT: 3 % (ref 1–4)
HCT VFR BLD CALC: 43.1 % (ref 36.3–47.1)
HEMOGLOBIN: 14.4 G/DL (ref 11.9–15.1)
IMMATURE GRANULOCYTES: 1 %
LYMPHOCYTES # BLD: 30 % (ref 24–43)
MCH RBC QN AUTO: 31.4 PG (ref 25.2–33.5)
MCHC RBC AUTO-ENTMCNC: 33.4 G/DL (ref 28.4–34.8)
MCV RBC AUTO: 93.9 FL (ref 82.6–102.9)
MONOCYTES # BLD: 9 % (ref 3–12)
NRBC AUTOMATED: 0 PER 100 WBC
PDW BLD-RTO: 13.2 % (ref 11.8–14.4)
PLATELET # BLD: 292 K/UL (ref 138–453)
PMV BLD AUTO: 11.6 FL (ref 8.1–13.5)
RBC # BLD: 4.59 M/UL (ref 3.95–5.11)
SEG NEUTROPHILS: 56 % (ref 36–65)
SEGMENTED NEUTROPHILS ABSOLUTE COUNT: 4.61 K/UL (ref 1.5–8.1)
VITAMIN B-12: 385 PG/ML (ref 232–1245)
WBC # BLD: 8 K/UL (ref 3.5–11.3)

## 2022-12-21 PROCEDURE — 99999 PR OFFICE/OUTPT VISIT,PROCEDURE ONLY: CPT | Performed by: NURSE PRACTITIONER

## 2022-12-21 PROCEDURE — 36415 COLL VENOUS BLD VENIPUNCTURE: CPT | Performed by: NURSE PRACTITIONER

## 2022-12-21 NOTE — PROGRESS NOTES
Venipuncture obtained from  right arm. Patient tolerated the procedure without complications or complaints.     1 pst 1 sst 1 lvv

## 2022-12-22 LAB
BUN BLDV-MCNC: 16 MG/DL (ref 8–23)
CREAT SERPL-MCNC: 0.82 MG/DL (ref 0.5–0.9)
GFR SERPL CREATININE-BSD FRML MDRD: >60 ML/MIN/1.73M2
MAGNESIUM: 1.7 MG/DL (ref 1.6–2.6)
VITAMIN D 25-HYDROXY: 67 NG/ML

## 2022-12-23 LAB — SOLUBLE TRANSFERRIN RECEPT: 2.9 MG/L (ref 1.9–4.4)

## 2023-01-17 RX ORDER — MONTELUKAST SODIUM 10 MG/1
10 TABLET ORAL DAILY
Qty: 90 TABLET | Refills: 3 | Status: SHIPPED | OUTPATIENT
Start: 2023-01-17

## 2023-01-17 RX ORDER — LISINOPRIL AND HYDROCHLOROTHIAZIDE 12.5; 1 MG/1; MG/1
1 TABLET ORAL DAILY
Qty: 90 TABLET | Refills: 3 | Status: SHIPPED | OUTPATIENT
Start: 2023-01-17 | End: 2023-05-17

## 2023-01-23 DIAGNOSIS — E78.2 MIXED HYPERLIPIDEMIA: ICD-10-CM

## 2023-01-23 RX ORDER — ATORVASTATIN CALCIUM 10 MG/1
10 TABLET, FILM COATED ORAL DAILY
Qty: 90 TABLET | Refills: 3 | Status: SHIPPED | OUTPATIENT
Start: 2023-01-23

## 2023-02-09 ENCOUNTER — OFFICE VISIT (OUTPATIENT)
Dept: FAMILY MEDICINE CLINIC | Age: 73
End: 2023-02-09
Payer: MEDICARE

## 2023-02-09 VITALS
SYSTOLIC BLOOD PRESSURE: 140 MMHG | BODY MASS INDEX: 23.78 KG/M2 | TEMPERATURE: 97.3 F | HEART RATE: 72 BPM | OXYGEN SATURATION: 98 % | DIASTOLIC BLOOD PRESSURE: 86 MMHG | WEIGHT: 130 LBS | RESPIRATION RATE: 16 BRPM

## 2023-02-09 DIAGNOSIS — J06.9 VIRAL URI WITH COUGH: ICD-10-CM

## 2023-02-09 DIAGNOSIS — J45.41 MODERATE PERSISTENT ASTHMATIC BRONCHITIS WITH ACUTE EXACERBATION: ICD-10-CM

## 2023-02-09 DIAGNOSIS — M25.512 CHRONIC LEFT SHOULDER PAIN: ICD-10-CM

## 2023-02-09 DIAGNOSIS — R07.89 INTERMITTENT LEFT-SIDED CHEST PAIN: ICD-10-CM

## 2023-02-09 DIAGNOSIS — R07.9 CHEST PAIN, UNSPECIFIED TYPE: Primary | ICD-10-CM

## 2023-02-09 DIAGNOSIS — R06.2 WHEEZING: ICD-10-CM

## 2023-02-09 DIAGNOSIS — G89.29 CHRONIC LEFT SHOULDER PAIN: ICD-10-CM

## 2023-02-09 LAB
INFLUENZA A ANTIGEN, POC: NEGATIVE
INFLUENZA B ANTIGEN, POC: NEGATIVE
LOT EXPIRE DATE: NORMAL
LOT KIT NUMBER: NORMAL
SARS-COV-2, POC: NORMAL
VALID INTERNAL CONTROL: PRESENT
VENDOR AND KIT NAME POC: NORMAL

## 2023-02-09 PROCEDURE — G8427 DOCREV CUR MEDS BY ELIG CLIN: HCPCS | Performed by: NURSE PRACTITIONER

## 2023-02-09 PROCEDURE — 1036F TOBACCO NON-USER: CPT | Performed by: NURSE PRACTITIONER

## 2023-02-09 PROCEDURE — G8420 CALC BMI NORM PARAMETERS: HCPCS | Performed by: NURSE PRACTITIONER

## 2023-02-09 PROCEDURE — 1090F PRES/ABSN URINE INCON ASSESS: CPT | Performed by: NURSE PRACTITIONER

## 2023-02-09 PROCEDURE — 1123F ACP DISCUSS/DSCN MKR DOCD: CPT | Performed by: NURSE PRACTITIONER

## 2023-02-09 PROCEDURE — G8399 PT W/DXA RESULTS DOCUMENT: HCPCS | Performed by: NURSE PRACTITIONER

## 2023-02-09 PROCEDURE — G8484 FLU IMMUNIZE NO ADMIN: HCPCS | Performed by: NURSE PRACTITIONER

## 2023-02-09 PROCEDURE — 99214 OFFICE O/P EST MOD 30 MIN: CPT | Performed by: NURSE PRACTITIONER

## 2023-02-09 PROCEDURE — 93005 ELECTROCARDIOGRAM TRACING: CPT | Performed by: NURSE PRACTITIONER

## 2023-02-09 PROCEDURE — 3017F COLORECTAL CA SCREEN DOC REV: CPT | Performed by: NURSE PRACTITIONER

## 2023-02-09 PROCEDURE — 87428 SARSCOV & INF VIR A&B AG IA: CPT | Performed by: NURSE PRACTITIONER

## 2023-02-09 PROCEDURE — 93010 ELECTROCARDIOGRAM REPORT: CPT | Performed by: NURSE PRACTITIONER

## 2023-02-09 PROCEDURE — 3074F SYST BP LT 130 MM HG: CPT | Performed by: NURSE PRACTITIONER

## 2023-02-09 PROCEDURE — 3078F DIAST BP <80 MM HG: CPT | Performed by: NURSE PRACTITIONER

## 2023-02-09 RX ORDER — BENZONATATE 200 MG/1
200 CAPSULE ORAL 3 TIMES DAILY PRN
Qty: 21 CAPSULE | Refills: 0 | Status: SHIPPED | OUTPATIENT
Start: 2023-02-09 | End: 2023-02-16

## 2023-02-09 RX ORDER — AZITHROMYCIN 250 MG/1
TABLET, FILM COATED ORAL
Qty: 6 TABLET | Refills: 0 | Status: SHIPPED | OUTPATIENT
Start: 2023-02-09

## 2023-02-09 RX ORDER — PREDNISONE 20 MG/1
20 TABLET ORAL 2 TIMES DAILY
Qty: 10 TABLET | Refills: 0 | Status: SHIPPED | OUTPATIENT
Start: 2023-02-09 | End: 2023-02-14

## 2023-02-09 ASSESSMENT — ENCOUNTER SYMPTOMS
ABDOMINAL PAIN: 0
CHEST TIGHTNESS: 1
EYE DISCHARGE: 0
SINUS PRESSURE: 1
SHORTNESS OF BREATH: 1
WHEEZING: 1
DIARRHEA: 0
RHINORRHEA: 1
VOMITING: 0
CONSTIPATION: 0
SINUS PAIN: 1
SORE THROAT: 1
COUGH: 1

## 2023-02-09 ASSESSMENT — PATIENT HEALTH QUESTIONNAIRE - PHQ9
SUM OF ALL RESPONSES TO PHQ QUESTIONS 1-9: 0
1. LITTLE INTEREST OR PLEASURE IN DOING THINGS: 0

## 2023-02-09 NOTE — PROGRESS NOTES
Brenda Aviles [address] [facility]  [place] [address] [facility]  Dept: [phone]  Dept Fax: [phone]    Visit type: Established patient    Reason for Visit: Shoulder Pain (Onset x this morning ), Neck Pain (Onset x this morning ), and Chest Pain (Onset x this morning )         Assessment and Plan       1. Chest pain, unspecified type  -     EKG 12 lead; Future  2. Chronic left shoulder pain  3. Viral URI with cough  -     POCT COVID-19 & Influenza A/B  4. Wheezing  5. Moderate persistent asthmatic bronchitis with acute exacerbation  -     predniSONE (DELTASONE) 20 MG tablet; Take 1 tablet by mouth 2 times daily for 5 days, Disp-10 tablet, R-0Normal  -     azithromycin (ZITHROMAX Z-HARISH) 250 MG tablet; 2 tablets day 1 then1 tablet days 2 - 5., Disp-6 tablet, R-0Normal  -     benzonatate (TESSALON) 200 MG capsule; Take 1 capsule by mouth 3 times daily as needed for Cough, Disp-21 capsule, R-0Normal  6. Intermittent left-sided chest pain  -     Stress test, Myoview - Clinic Performed; Future  -     Echocardiogram complete; Future      Return for keep fu apt . Subjective       Felt like her asthma was acting up last night, and had throat pain  Fatigue  Wanted to stay in bed  States that she was having shoulder discomfort at that time and is worse with taking a breath in   with a cold at home that tested for covid at home  SOB, wheezing, cough   Has not used albuterol inhaler at home  Does have a history of HTN, HLD and is taking prinzide and lipitor. Had a stress test completed \" a very long time ago\"  Does have chronic left shoulder pain - but this is not worse     States that there was two different times in her past that she had CP in the past.   One time she had was at a bridal shower in which she had \"horrible pain and was diaphoretic\"- and by the end of the shower felt relieved. This was 8-10 years ago.              Review of Systems   Constitutional:  Positive for activity change, appetite change and fatigue. Negative for fever. HENT:  Positive for congestion, ear pain, postnasal drip, rhinorrhea, sinus pressure, sinus pain and sore throat. Eyes:  Negative for discharge and visual disturbance. Respiratory:  Positive for cough, chest tightness, shortness of breath and wheezing. Cardiovascular:  Negative for chest pain (feels tight and worse with breathing) and palpitations. Gastrointestinal:  Negative for abdominal pain, constipation, diarrhea and vomiting. Genitourinary:  Negative for difficulty urinating and hematuria. Musculoskeletal:  Negative for arthralgias and myalgias. Skin:  Negative for rash. Neurological:  Negative for dizziness, weakness, numbness and headaches. Psychiatric/Behavioral:  The patient is not nervous/anxious. Allergies   Allergen Reactions    Keflex [Cephalexin] Hives    Theophyllines Nausea And Vomiting       Outpatient Medications Prior to Visit   Medication Sig Dispense Refill    atorvastatin (LIPITOR) 10 MG tablet TAKE 1 TABLET BY MOUTH  DAILY 90 tablet 3    lisinopril-hydroCHLOROthiazide (PRINZIDE;ZESTORETIC) 10-12.5 MG per tablet TAKE 1 TABLET BY MOUTH DAILY 90 tablet 3    montelukast (SINGULAIR) 10 MG tablet TAKE 1 TABLET BY MOUTH DAILY 90 tablet 3    Omega-3 Fatty Acids (FISH OIL) 1000 MG CAPS Take by mouth      albuterol sulfate HFA (VENTOLIN HFA) 108 (90 Base) MCG/ACT inhaler Inhale 2 puffs into the lungs 4 times daily as needed for Wheezing 18 g 0    budesonide (ENTOCORT EC) 3 MG extended release capsule       omeprazole (PRILOSEC) 20 MG delayed release capsule       primidone (MYSOLINE) 50 MG tablet       Tafluprost, PF, (ZIOPTAN) 0.0015 % SOLN Apply 1 drop to eye nightly      Netarsudil Dimesylate (RHOPRESSA) 0.02 % SOLN Apply 1 drop to eye daily Right eye      dorzolamide (TRUSOPT) 2 % ophthalmic solution Place 1 drop into both eyes 2 times daily        No facility-administered medications prior to visit. Past Medical History:   Diagnosis Date    Asthma     Chronic back pain     Chronic kidney disease     cysts on her lt kidney    Colitis     Glaucoma     2011    Hyperlipidemia     Hypertension         Social History     Tobacco Use    Smoking status: Former     Packs/day: 0.50     Years: 2.00     Pack years: 1.00     Types: Cigarettes     Start date:      Quit date:      Years since quittin.1    Smokeless tobacco: Never   Substance Use Topics    Alcohol use: Yes     Comment: daily glass of wine        Past Surgical History:   Procedure Laterality Date    BREAST SURGERY      needle biopsies     SECTION      x2    HYSTERECTOMY (CERVIX STATUS UNKNOWN)      SEPTOPLASTY  2016    TONSILLECTOMY      TURBINATE RESECTION  2016       Family History   Problem Relation Age of Onset    High Cholesterol Mother     Hypertension Father     Stroke Father     Alcohol Abuse Sister         liver and kidney failure       Objective       BP (!) 140/86   Pulse 72   Temp 97.3 °F (36.3 °C) (Temporal)   Resp 16   Wt 130 lb (59 kg)   SpO2 98%   BMI 23.78 kg/m²   Physical Exam  Vitals reviewed. Constitutional:       Appearance: She is well-developed. HENT:      Head: Normocephalic and atraumatic. Right Ear: Tympanic membrane and external ear normal.      Left Ear: Tympanic membrane and external ear normal.      Nose:      Right Sinus: No maxillary sinus tenderness or frontal sinus tenderness. Left Sinus: No maxillary sinus tenderness or frontal sinus tenderness. Mouth/Throat:      Pharynx: Oropharynx is clear. Uvula midline. Tonsils: No tonsillar exudate or tonsillar abscesses. Eyes:      Conjunctiva/sclera: Conjunctivae normal.   Neck:      Thyroid: No thyromegaly. Trachea: Trachea normal.   Cardiovascular:      Rate and Rhythm: Normal rate and regular rhythm. Heart sounds: Normal heart sounds. No murmur heard. No friction rub. No gallop.    Pulmonary: Effort: Pulmonary effort is normal.      Breath sounds: Wheezing present. Abdominal:      General: Bowel sounds are normal.      Palpations: Abdomen is soft. Tenderness: There is no abdominal tenderness. Musculoskeletal:         General: Normal range of motion. Cervical back: Normal range of motion and neck supple. No spinous process tenderness. Skin:     General: Skin is warm and dry. Findings: No erythema or rash. Neurological:      Mental Status: She is alert and oriented to person, place, and time. Psychiatric:         Speech: Speech normal.         Behavior: Behavior normal.         Thought Content:  Thought content normal.         Data Reviewed and Summarized       Labs:     Imaging/Testing:        Rao Hennessy, SONYA - CNP

## 2023-02-23 ENCOUNTER — NURSE ONLY (OUTPATIENT)
Dept: LAB | Age: 73
End: 2023-02-23

## 2023-02-23 ENCOUNTER — OFFICE VISIT (OUTPATIENT)
Dept: FAMILY MEDICINE CLINIC | Age: 73
End: 2023-02-23

## 2023-02-23 VITALS
BODY MASS INDEX: 24.47 KG/M2 | OXYGEN SATURATION: 98 % | TEMPERATURE: 96.8 F | DIASTOLIC BLOOD PRESSURE: 79 MMHG | SYSTOLIC BLOOD PRESSURE: 152 MMHG | WEIGHT: 133.8 LBS | HEART RATE: 62 BPM | RESPIRATION RATE: 18 BRPM

## 2023-02-23 DIAGNOSIS — R06.02 SHORTNESS OF BREATH: ICD-10-CM

## 2023-02-23 DIAGNOSIS — R06.02 SHORTNESS OF BREATH: Primary | ICD-10-CM

## 2023-02-23 SDOH — ECONOMIC STABILITY: FOOD INSECURITY: WITHIN THE PAST 12 MONTHS, YOU WORRIED THAT YOUR FOOD WOULD RUN OUT BEFORE YOU GOT MONEY TO BUY MORE.: NEVER TRUE

## 2023-02-23 SDOH — ECONOMIC STABILITY: HOUSING INSECURITY
IN THE LAST 12 MONTHS, WAS THERE A TIME WHEN YOU DID NOT HAVE A STEADY PLACE TO SLEEP OR SLEPT IN A SHELTER (INCLUDING NOW)?: NO

## 2023-02-23 SDOH — ECONOMIC STABILITY: FOOD INSECURITY: WITHIN THE PAST 12 MONTHS, THE FOOD YOU BOUGHT JUST DIDN'T LAST AND YOU DIDN'T HAVE MONEY TO GET MORE.: NEVER TRUE

## 2023-02-23 SDOH — ECONOMIC STABILITY: INCOME INSECURITY: HOW HARD IS IT FOR YOU TO PAY FOR THE VERY BASICS LIKE FOOD, HOUSING, MEDICAL CARE, AND HEATING?: NOT HARD AT ALL

## 2023-02-23 ASSESSMENT — PATIENT HEALTH QUESTIONNAIRE - PHQ9
2. FEELING DOWN, DEPRESSED OR HOPELESS: 0
SUM OF ALL RESPONSES TO PHQ QUESTIONS 1-9: 0
SUM OF ALL RESPONSES TO PHQ9 QUESTIONS 1 & 2: 0
1. LITTLE INTEREST OR PLEASURE IN DOING THINGS: 0

## 2023-02-23 ASSESSMENT — ENCOUNTER SYMPTOMS
RHINORRHEA: 0
VOMITING: 0
EYE PAIN: 0
COUGH: 0
NAUSEA: 0
SHORTNESS OF BREATH: 1
ABDOMINAL PAIN: 0
EYE REDNESS: 0
DIARRHEA: 0
CONSTIPATION: 0

## 2023-02-23 NOTE — PROGRESS NOTES
Catherine Goddard (:  1950) is a 74 y.o. female,Established patient, here for evaluation of the following chief complaint(s):  Shortness of Breath (Cough, mucus build up in throat x couple days.  has pneumonia and being tested for Legionella.)         ASSESSMENT/PLAN:  1. Shortness of breath  -     Legionella Antigen, Urine; Future  75-year-old female presents the office for concerns of legionnaires. Patient's  is currently admitted for pneumonia but legionnaires testing pending at this time. Patient is adamant about being tested for legionnaires. We will plan to place order in for patient to go to the hospital for further testing. We will notify of results once back. No acute concerns this time. Physical exam benign. Patient was educated on signs symptoms look out for that require more urgent evaluation. Patient verbalized understanding. Elevated blood pressure. Patient mildly elevated blood pressure and exam most likely due to increasing anxiety associated with her 's current illness being hospitalized and her concern for possibly having legionnaires. Return if symptoms worsen or fail to improve. Subjective   SUBJECTIVE/OBJECTIVE:  75-year-old female presents to the office for concerns of wanting tested for Legionella. Patient states that her  was just recently seen in the emergency department and admitted yesterday for right lower lobe pneumonia. Because patients  has a history of multiple myeloma is currently immunosuppressed was admitted and placed on antibiotics. They are currently testing him for legionnaires/Legionella pneumonia due to history of venous depression and having hyponatremia on blood work. Patient states she started develop some mild shortness of breath with phlegm and is concerned that maybe she also has Legionella. Patient is interesting in tested for this.       Past Medical History:   Diagnosis Date    Asthma     Chronic back pain     Chronic kidney disease     cysts on her lt kidney    Colitis     Glaucoma         Hyperlipidemia     Hypertension        Past Surgical History:   Procedure Laterality Date    BREAST SURGERY      needle biopsies     SECTION      x2    HYSTERECTOMY (CERVIX STATUS UNKNOWN)      SEPTOPLASTY  2016    TONSILLECTOMY      TURBINATE RESECTION  2016       Family History   Problem Relation Age of Onset    High Cholesterol Mother     Hypertension Father     Stroke Father     Alcohol Abuse Sister         liver and kidney failure       Social History     Tobacco Use    Smoking status: Former     Packs/day: 0.50     Years: 2.00     Pack years: 1.00     Types: Cigarettes     Start date:      Quit date:      Years since quittin.1    Smokeless tobacco: Never   Substance Use Topics    Alcohol use: Yes     Comment: daily glass of wine    Drug use: No         Current Outpatient Medications:     atorvastatin (LIPITOR) 10 MG tablet, TAKE 1 TABLET BY MOUTH  DAILY, Disp: 90 tablet, Rfl: 3    lisinopril-hydroCHLOROthiazide (PRINZIDE;ZESTORETIC) 10-12.5 MG per tablet, TAKE 1 TABLET BY MOUTH DAILY, Disp: 90 tablet, Rfl: 3    montelukast (SINGULAIR) 10 MG tablet, TAKE 1 TABLET BY MOUTH DAILY, Disp: 90 tablet, Rfl: 3    Omega-3 Fatty Acids (FISH OIL) 1000 MG CAPS, Take by mouth, Disp: , Rfl:     albuterol sulfate HFA (VENTOLIN HFA) 108 (90 Base) MCG/ACT inhaler, Inhale 2 puffs into the lungs 4 times daily as needed for Wheezing, Disp: 18 g, Rfl: 0    budesonide (ENTOCORT EC) 3 MG extended release capsule, , Disp: , Rfl:     omeprazole (PRILOSEC) 20 MG delayed release capsule, , Disp: , Rfl:     primidone (MYSOLINE) 50 MG tablet, , Disp: , Rfl:     Tafluprost, PF, (ZIOPTAN) 0.0015 % SOLN, Apply 1 drop to eye nightly, Disp: , Rfl:     Netarsudil Dimesylate (RHOPRESSA) 0.02 % SOLN, Apply 1 drop to eye daily Right eye, Disp: , Rfl:     dorzolamide (TRUSOPT) 2 % ophthalmic solution, Place 1 drop into both eyes 2 times daily , Disp: , Rfl:     Allergies   Allergen Reactions    Keflex [Cephalexin] Hives    Theophyllines Nausea And Vomiting       Review of Systems   Constitutional:  Negative for fatigue and fever. HENT:  Negative for congestion, postnasal drip and rhinorrhea. Eyes:  Negative for pain and redness. Respiratory:  Positive for shortness of breath. Negative for cough. Cardiovascular:  Negative for chest pain and palpitations. Gastrointestinal:  Negative for abdominal pain, constipation, diarrhea, nausea and vomiting. Genitourinary:  Negative for difficulty urinating and dysuria. Skin:  Negative for pallor and rash. Neurological:  Negative for dizziness and headaches. Objective   Vitals:    02/23/23 1431 02/23/23 1433   BP: (!) 161/71 (!) 152/79   Site: Left Upper Arm Right Upper Arm   Position: Sitting Sitting   Cuff Size: Medium Adult Medium Adult   Pulse: 66 62   Resp: 18    Temp: 96.8 °F (36 °C)    TempSrc: Temporal    SpO2: 98%    Weight: 133 lb 12.8 oz (60.7 kg)        Physical Exam  Vitals and nursing note reviewed. Constitutional:       General: She is not in acute distress. Appearance: Normal appearance. Cardiovascular:      Rate and Rhythm: Normal rate and regular rhythm. Pulses: Normal pulses. Heart sounds: Normal heart sounds. No murmur heard. Pulmonary:      Effort: Pulmonary effort is normal. No respiratory distress. Breath sounds: Normal breath sounds. No wheezing or rhonchi. Musculoskeletal:      Cervical back: Neck supple. Lymphadenopathy:      Cervical: No cervical adenopathy. Skin:     General: Skin is warm and dry. Neurological:      Mental Status: She is alert. An electronic signature was used to authenticate this note. --Jarett Galo DO          **This report has been created using voice recognition software. It may contain minor errors which are inherent in voice recognition technology. **

## 2023-02-24 LAB — L PNEUMO1 AG UR QL IA.RAPID: NEGATIVE

## 2023-03-02 ENCOUNTER — HOSPITAL ENCOUNTER (OUTPATIENT)
Dept: NON INVASIVE DIAGNOSTICS | Age: 73
Discharge: HOME | End: 2023-03-02
Payer: MEDICARE

## 2023-03-02 DIAGNOSIS — R07.89 INTERMITTENT LEFT-SIDED CHEST PAIN: ICD-10-CM

## 2023-03-02 LAB
LV EF: 60 %
LV EF: 79 %
LVEF MODALITY: NORMAL
LVEF MODALITY: NORMAL

## 2023-03-02 PROCEDURE — A9500 TC99M SESTAMIBI: HCPCS | Performed by: NUCLEAR MEDICINE

## 2023-03-02 PROCEDURE — 78452 HT MUSCLE IMAGE SPECT MULT: CPT

## 2023-03-02 PROCEDURE — 3430000000 HC RX DIAGNOSTIC RADIOPHARMACEUTICAL: Performed by: NUCLEAR MEDICINE

## 2023-03-02 PROCEDURE — 93017 CV STRESS TEST TRACING ONLY: CPT | Performed by: NUCLEAR MEDICINE

## 2023-03-02 PROCEDURE — 93306 TTE W/DOPPLER COMPLETE: CPT

## 2023-03-02 RX ORDER — TECHNETIUM TC-99M SESTAMIBI 1 MG/10ML
32 INJECTION INTRAVENOUS
Status: COMPLETED | OUTPATIENT
Start: 2023-03-02 | End: 2023-03-02

## 2023-03-02 RX ORDER — TECHNETIUM TC-99M SESTAMIBI 1 MG/10ML
9.6 INJECTION INTRAVENOUS
Status: COMPLETED | OUTPATIENT
Start: 2023-03-02 | End: 2023-03-02

## 2023-03-02 RX ADMIN — Medication 32 MILLICURIE: at 10:55

## 2023-03-02 RX ADMIN — Medication 9.6 MILLICURIE: at 09:55

## 2023-03-07 ENCOUNTER — OFFICE VISIT (OUTPATIENT)
Dept: FAMILY MEDICINE CLINIC | Age: 73
End: 2023-03-07

## 2023-03-07 VITALS
TEMPERATURE: 97.8 F | RESPIRATION RATE: 18 BRPM | OXYGEN SATURATION: 98 % | DIASTOLIC BLOOD PRESSURE: 60 MMHG | SYSTOLIC BLOOD PRESSURE: 124 MMHG | HEART RATE: 68 BPM | BODY MASS INDEX: 24.29 KG/M2 | WEIGHT: 132.8 LBS

## 2023-03-07 DIAGNOSIS — R07.9 CHEST PAIN, UNSPECIFIED TYPE: ICD-10-CM

## 2023-03-07 DIAGNOSIS — G89.29 CHRONIC LEFT SHOULDER PAIN: ICD-10-CM

## 2023-03-07 DIAGNOSIS — R06.02 SHORTNESS OF BREATH: Primary | ICD-10-CM

## 2023-03-07 DIAGNOSIS — H10.021 PINK EYE DISEASE OF RIGHT EYE: ICD-10-CM

## 2023-03-07 DIAGNOSIS — I10 ESSENTIAL HYPERTENSION: ICD-10-CM

## 2023-03-07 DIAGNOSIS — R07.89 INTERMITTENT LEFT-SIDED CHEST PAIN: ICD-10-CM

## 2023-03-07 DIAGNOSIS — J45.41 MODERATE PERSISTENT ASTHMATIC BRONCHITIS WITH ACUTE EXACERBATION: ICD-10-CM

## 2023-03-07 DIAGNOSIS — M25.512 CHRONIC LEFT SHOULDER PAIN: ICD-10-CM

## 2023-03-07 RX ORDER — POLYMYXIN B SULFATE AND TRIMETHOPRIM 1; 10000 MG/ML; [USP'U]/ML
1 SOLUTION OPHTHALMIC EVERY 4 HOURS
Qty: 1 EACH | Refills: 0 | Status: SHIPPED | OUTPATIENT
Start: 2023-03-07

## 2023-03-07 RX ORDER — PREDNISONE 20 MG/1
20 TABLET ORAL 2 TIMES DAILY
Qty: 10 TABLET | Refills: 0 | Status: SHIPPED | OUTPATIENT
Start: 2023-03-07 | End: 2023-03-12

## 2023-03-07 ASSESSMENT — PATIENT HEALTH QUESTIONNAIRE - PHQ9
SUM OF ALL RESPONSES TO PHQ9 QUESTIONS 1 & 2: 0
1. LITTLE INTEREST OR PLEASURE IN DOING THINGS: 0
SUM OF ALL RESPONSES TO PHQ QUESTIONS 1-9: 0
2. FEELING DOWN, DEPRESSED OR HOPELESS: 0

## 2023-03-07 ASSESSMENT — ENCOUNTER SYMPTOMS
RHINORRHEA: 0
DIARRHEA: 0
CHEST TIGHTNESS: 1
SHORTNESS OF BREATH: 1
WHEEZING: 1
SINUS PRESSURE: 0
EYE ITCHING: 1
SINUS PAIN: 0
COUGH: 1
VOMITING: 0
SORE THROAT: 0
EYE PAIN: 1
CONSTIPATION: 0
EYE DISCHARGE: 1
EYE REDNESS: 1
ABDOMINAL PAIN: 0

## 2023-03-07 NOTE — PROGRESS NOTES
Afia Alicia [address] [facility] [address] [facility] [facility] [address]  Dept: [phone]  Dept Fax: [phone]    Visit type: Established patient    Reason for Visit: Hypertension (1 month follow up), Results (Echo 02/09/2023; Stress Test 03/02/2023), Health Maintenance (Tdap Vaccine; Shingles Vaccine; Pneumonia Vaccine; Covid Booster; Flu Vaccine), Shortness of Breath (Phlegm build up x month), and Conjunctivitis (Right eye- irritated, itchy x couple days- Taking Singulair 10mg)         Assessment and Plan       1. Shortness of breath  2. Chest pain, unspecified type  3. Chronic left shoulder pain  4. Intermittent left-sided chest pain  5. Essential hypertension  6. Moderate persistent asthmatic bronchitis with acute exacerbation  -     predniSONE (DELTASONE) 20 MG tablet; Take 1 tablet by mouth 2 times daily for 5 days, Disp-10 tablet, R-0Normal  7. Pink eye disease of right eye  -     trimethoprim-polymyxin b (POLYTRIM) 07573-9.1 UNIT/ML-% ophthalmic solution; Place 1 drop into the right eye every 4 hours, Disp-1 each, R-0Normal    Start mucinex OTC  Will do another round of steroids that are higher dosing and then if your SOB persists and needing use inhaler 1-2 times a day will add flovent   Cardiac testing reviewed  Return in about 6 weeks (around 4/18/2023) for fu chronic issues. Subjective       Felt like her asthma was acting up last night, and had throat pain  Fatigue  Wanted to stay in bed  States that she was having shoulder discomfort at that time and is worse with taking a breath in   with a cold at home that tested for covid at home  SOB, wheezing, cough   Has not used albuterol inhaler at home  Does have a history of HTN, HLD and is taking prinzide and lipitor.    Had a stress test completed \" a very long time ago\"  Does have chronic left shoulder pain - but this is not worse   States that there was two different times in her past that she had CP in the past.   One time she had was at a bridal shower in which she had \"horrible pain and was diaphoretic\"- and by the end of the shower felt relieved. This was 8-10 years ago. Update 3/7/2023 Is here to review cardiac testing. Does have ventolin to use in which this has been helpful- is using once a day   Rare cough  Does have a lot of mucus in the morning  Sleeping well  No fever   Did complete steroid, z-ellie and tessalon pearls a month ago and it did not completley go away at this time  Did have concern for legionella because of exposure to  but that was negative     Right eye is itching  Does have glaucoma and is taking drops   Is also taking singulair  Is wondering what else she should be doing  Is taking a refresh lubricant  Will go pink every once in a while, and noticed it this morning           Review of Systems   Constitutional:  Negative for activity change, appetite change, fatigue and fever. HENT:  Positive for congestion. Negative for ear pain, postnasal drip, rhinorrhea, sinus pressure, sinus pain and sore throat. Eyes:  Positive for pain, discharge, redness and itching. Negative for visual disturbance. Respiratory:  Positive for cough, chest tightness, shortness of breath and wheezing. Cardiovascular:  Negative for chest pain (feels tight and worse with breathing) and palpitations. Gastrointestinal:  Negative for abdominal pain, constipation, diarrhea and vomiting. Genitourinary:  Negative for difficulty urinating and hematuria. Musculoskeletal:  Negative for arthralgias and myalgias. Skin:  Negative for rash. Neurological:  Negative for dizziness, weakness, numbness and headaches. Psychiatric/Behavioral:  The patient is not nervous/anxious.        Allergies   Allergen Reactions    Keflex [Cephalexin] Hives    Theophyllines Nausea And Vomiting       Outpatient Medications Prior to Visit   Medication Sig Dispense Refill    atorvastatin (LIPITOR) 10 MG tablet TAKE 1 TABLET BY MOUTH DAILY 90 tablet 3    lisinopril-hydroCHLOROthiazide (PRINZIDE;ZESTORETIC) 10-12.5 MG per tablet TAKE 1 TABLET BY MOUTH DAILY 90 tablet 3    montelukast (SINGULAIR) 10 MG tablet TAKE 1 TABLET BY MOUTH DAILY 90 tablet 3    Omega-3 Fatty Acids (FISH OIL) 1000 MG CAPS Take by mouth      albuterol sulfate HFA (VENTOLIN HFA) 108 (90 Base) MCG/ACT inhaler Inhale 2 puffs into the lungs 4 times daily as needed for Wheezing 18 g 0    budesonide (ENTOCORT EC) 3 MG extended release capsule       omeprazole (PRILOSEC) 20 MG delayed release capsule       primidone (MYSOLINE) 50 MG tablet       Tafluprost, PF, (ZIOPTAN) 0.0015 % SOLN Apply 1 drop to eye nightly      Netarsudil Dimesylate (RHOPRESSA) 0.02 % SOLN Apply 1 drop to eye daily Right eye      dorzolamide (TRUSOPT) 2 % ophthalmic solution Place 1 drop into both eyes 2 times daily        No facility-administered medications prior to visit.         Past Medical History:   Diagnosis Date    Asthma     Chronic back pain     Chronic kidney disease     cysts on her lt kidney    Colitis     Glaucoma         Hyperlipidemia     Hypertension         Social History     Tobacco Use    Smoking status: Former     Packs/day: 0.50     Years: 5.00     Pack years: 2.50     Types: Cigarettes     Start date:      Quit date:      Years since quittin.1    Smokeless tobacco: Never   Substance Use Topics    Alcohol use: Yes     Comment: daily glass of wine        Past Surgical History:   Procedure Laterality Date    BREAST SURGERY      needle biopsies     SECTION      x2    HYSTERECTOMY (CERVIX STATUS UNKNOWN)      SEPTOPLASTY  2016    TONSILLECTOMY      TURBINATE RESECTION  2016       Family History   Problem Relation Age of Onset    High Cholesterol Mother     Hypertension Father     Stroke Father     Alcohol Abuse Sister         liver and kidney failure       Objective       /60 (Site: Right Upper Arm, Position: Sitting, Cuff Size: Medium Adult)   Pulse 68   Temp 97.8 °F (36.6 °C) (Temporal)   Resp 18   Wt 132 lb 12.8 oz (60.2 kg)   SpO2 98%   BMI 24.29 kg/m²   Physical Exam  Vitals reviewed. Constitutional:       Appearance: She is well-developed. HENT:      Head: Normocephalic and atraumatic. Right Ear: External ear normal.      Left Ear: External ear normal.   Eyes:      Conjunctiva/sclera:      Right eye: Right conjunctiva is injected. Exudate present. Neck:      Thyroid: No thyromegaly. Trachea: Trachea normal.   Cardiovascular:      Rate and Rhythm: Normal rate and regular rhythm. Heart sounds: Normal heart sounds. No murmur heard. No friction rub. No gallop. Pulmonary:      Effort: Pulmonary effort is normal.      Breath sounds: Normal breath sounds. No wheezing. Abdominal:      General: Bowel sounds are normal.      Palpations: Abdomen is soft. Tenderness: There is no abdominal tenderness. Musculoskeletal:         General: Normal range of motion. Cervical back: Normal range of motion and neck supple. No spinous process tenderness. Skin:     General: Skin is warm and dry. Findings: No erythema or rash. Neurological:      Mental Status: She is alert and oriented to person, place, and time. Psychiatric:         Speech: Speech normal.         Behavior: Behavior normal.         Thought Content:  Thought content normal.       Data Reviewed and Summarized       Labs:     Imaging/Testing:    Narrative   Cardiac Perfusion Imaging        Demographics        Patient Name   Asia Hernandez  Gender                 Female        MR #           [id number]       Race                                                          Ethnicity        Account #      [de-identified]       Room Number        Accession      [id number]      Date of study          03/02/2023    Number        Date of Birth  1950      Referring Physician    Ariella Patel CNP        Age            74 year(s) NM Technologist        Debbie Zuniga [facility]        Stress Staff   ,   Interpreting           Jackie Ibarra MD                   RN              Cardiologist        The procedure was explained in detail to the patient. Risks,    complications and alternative treatments were reviewed. Written consent    was obtained. Procedure   Procedure Type:        Nuclear Stress Test:Exercise, Exercise Cardiolite Stress       Indications: Abnormal rest ECG, Shortness of breath and chest pain. Medical History:Patient history obtained by: Charmayne Stai, RN. Chest pain   status: chest pain, radiation to neck/jaw, radiation to arms, radiation to   back . Other symptoms: dyspnea, dizziness . Family history of heart disease. Risk Factors        The patient risk factors include:former tobacco use, treated    hypercholesterolemia, treated hypertension, chronic lung disease and (pack    years: 3 years not smokin). Stress Protocols        Resting ECG    Normal sinus rhythm. Resting HR:70 bpm                Resting BP:162/71 mmHg       Stress Protocol:Exercise - Abiel   +--------+-----+----+-----------+-----+--------+---+--+--------+------+----+   ! Stage # ! Time ! Work! Speed/Grade! Heart! Blood   ! RPE!CP! Pain    ! Pain  ! Pain!   !        !     !    !           !Rate ! Pressure!   ! !Location! Action! Type!   +--------+-----+----+-----------+-----+--------+---+--+--------+------+----+   !1.0     !03:00!4.7 !1.7/10.0   !130  !178/70  !   !  !        !      !    !   +--------+-----+----+-----------+-----+--------+---+--+--------+------+----+   ! Recovery! 00:00!    !           !130  !178/80  !   !  !        !      !    !   +--------+-----+----+-----------+-----+--------+---+--+--------+------+----+   ! Recovery! 02:00!    !           !80   !171/74  !   !  !        !      !    !   +--------+-----+----+-----------+-----+--------+---+--+--------+------+----+   ! Recovery! 05:00!    !           !70   !151/70  ! !  !        !      !    !   +--------+-----+----+-----------+-----+--------+---+--+--------+------+----+        Peak HR:130 bpm            HR response: Appropriate    Peak BP:178/70 mmHg        BP response: Normal Resting BP with appropriate    Predicted HR: 146 bpm      response to Stress    % of predicted HR: 89      HR/BP product:29859    Test duration:3 min        Max exercise: 4.7 METS    Reason for    termination:Dyspnea        ECG Findings    ST changes in II,III, AVF, V4, V5, V6 that did not resolve until rested    for 12:30 minutes. Symptoms    Denied any chest pain or other symptoms with EKG changes. EKG changes    resolved after 12 minutes and 30 seconds at rest.        Complications    Procedure complication was none. Imaging Protocols        Rest                                Stress        Isotope:Tc-99m Sestamibi            Isotope: Tc-99m Sestamibi    Isotope dose:9.6 mCi                Isotope dose:32 mCi    Date:03/02/2023 09:55               Date:03/02/2023 10:55        Technique:           SPECT          Technique:           Gated                                                             SPECT       Imaging Results:Calculated gated LVEF 79 %. This study was negative for ischemia. normal EF        Conclusions        Summary    This Nuclear Medicine study was negative for ischemia . normal EF    abnormal ECG with st t changes        Recommendation    Clinical correlation is recommended. Signatures        ----------------------------------------------------------------    Electronically signed by Maya Charles MD (Interpreting    Cardiologist) on 03/02/2023 at 14:29    ----------------------------------------------------------------       Medical History        Accession#:                           [id number]       Admission Data   Admission date: 03/02/2023 Admission Time: 08:36       Hospital Status: Outpatient. [email or web address] [id number]%3d%3d   Echocardiogram complete  Order: [id number]  Status: Edited Result - FINAL    Visible to patient: Yes (not seen)    Next appt: Today at 10:40 AM in Family Medicine (Karla [place], APRN - CNP)    Dx: Intermittent left-sided chest pain    0 Result Notes     important suggestion  Newer results are available. Click to view them now. Component 5 d ago   Left Ventricular Ejection Fraction 60    LVEF MODALITY ECHO            Narrative & Impression    Transthoracic Echocardiography Report (TTE)      Demographics      Patient Name    Chaitanya Hernández Gender               Female      MR #            [id number]      Race                                                      Ethnicity      Account #       [de-identified]      Room Number      Accession       [id number]     Date of Study        03/02/2023   Number      Date of Birth   1950     Referring Physician  Rosanna Morales CNP      Age             74 year(s)     Sonographer          Le Mendoza RDCS                                     Interpreting         Echo reader of the                                  Physician            arlene Pittman MD     Procedure     Type of Study      TTE procedure:ECHOCARDIOGRAM COMPLETE 2D W DOPPLER W COLOR. Procedure Date  Date: 03/02/2023 Start: 08:52 AM     Study Location: Echo Lab  Technical Quality: Adequate visualization     Indications:Chest pain. Additional Medical History:Hypertension, Asthma, Chronic Kidney disease,  Hyperlipidemia, Arthritis, Ex Smoker, Asthma. Patient Status: Routine     Height: 62 inches Weight: 133 pounds BSA: 1.61 m^2 BMI: 24.33 kg/m^2     BP: 152/79 mmHg      Conclusions      Summary   Ejection fraction is visually estimated at 60%.    Overall left ventricular function is normal.      Signature      ----------------------------------------------------------------   Electronically signed by Richard Reyes MD (Interpreting   physician) on 03/02/2023 at 02:54 PM   ----------------------------------------------------------------      Findings      Mitral Valve   The mitral valve structure was normal with normal leaflet separation. DOPPLER: The transmitral velocity was within the normal range with no   evidence for mitral stenosis. There was no evidence of mitral   regurgitation. Aortic Valve   The aortic valve was trileaflet with normal thickness and cuspal   separation. DOPPLER: Transaortic velocity was within the normal range with   no evidence of aortic stenosis. There was no evidence of aortic   regurgitation. Tricuspid Valve   The tricuspid valve structure was normal with normal leaflet separation. DOPPLER: There was no evidence of tricuspid stenosis. There was no   evidence of tricuspid regurgitation. Pulmonic Valve   The pulmonic valve was not well visualized . Trivial pulmonic regurgitation visualized. Left Atrium   Left atrial size was normal.      Left Ventricle   Ejection fraction is visually estimated at 60%. Overall left ventricular function is normal.      Right Atrium   Right atrial size was normal.      Right Ventricle   The right ventricular size was normal with normal systolic function and   wall thickness. Pericardial Effusion   The pericardium was normal in appearance with no evidence of a pericardial   effusion. Pleural Effusion   No evidence of pleural effusion. Aorta / Great Vessels   -Aortic root dimension within normal limits.   -The Pulmonary artery is within normal limits. -IVC size is within normal limits with normal respiratory phasic changes.      M-Mode/2D Measurements & Calculations      LV Diastolic   LV Systolic Dimension:    AV Cusp Separation: 1.7 cmLA   Dimension: 4.5 3.2 cm                    Dimension: 2. 7 cmAO Root   cm             LV Volume Diastolic: 64.8 Dimension: 2.5 cmLA Area: 15.6   LV FS:28.9 %   ml                        cm^2   LV PW          LV Volume Systolic: 41 ml   Diastolic: 1   LV EDV/LV EDV Index: 92.4   cm             ml/57 m^2LV ESV/LV ESV   Septum         Index: 41 ml/25 m^2       RV Diastolic Dimension: 2.2 cm   Diastolic: 1.1 EF Calculated: 55.6 %   cm                                       LA/Aorta: 1.08                                            Ascending Aorta: 2.8 cm                                            LA volume/Index: 38.3 ml /24m^2     Doppler Measurements & Calculations      MV Peak E-Wave: 87 cm/s    AV Peak Velocity: 128  LVOT Peak Velocity: 107   MV Peak A-Wave: 69.4 cm/s  cm/s                   cm/s   MV E/A Ratio: 1.25         AV Peak Gradient: 6.55 LVOT Peak Gradient: 5   MV Peak Gradient: 3.03     mmHg                   mmHg   mmHg                                                     TV Peak E-Wave: 63 cm/s   MV Deceleration Time: 215                         TV Peak A-Wave: 39 cm/s   msec                              IVRT: 85 msec          TV Peak Gradient: 1.59                                                     mmHg   MV E' Septal Velocity: 5.9                        TR Velocity:174 cm/s   cm/s                       AV DVI (Vmax):0.84     TR Gradient:12.11 mmHg   MV A' Septal Velocity: 7.9                        PV Peak Velocity: 74.6   cm/s                                              cm/s   MV E' Lateral Velocity:                           PV Peak Gradient: 2.23   6.6 cm/s                                          mmHg   MV A' Lateral Velocity:   8.3 cm/s   E/E' septal: 14.75   E/E' lateral: 13.18     [email or web address] [id number]%3d%3d             AZEEM MAHMOOD, APRN - CNP

## 2023-04-18 ENCOUNTER — OFFICE VISIT (OUTPATIENT)
Dept: FAMILY MEDICINE CLINIC | Age: 73
End: 2023-04-18
Payer: MEDICARE

## 2023-04-18 VITALS
TEMPERATURE: 97.2 F | SYSTOLIC BLOOD PRESSURE: 132 MMHG | BODY MASS INDEX: 24.8 KG/M2 | RESPIRATION RATE: 18 BRPM | DIASTOLIC BLOOD PRESSURE: 70 MMHG | WEIGHT: 135.6 LBS | HEART RATE: 77 BPM | OXYGEN SATURATION: 96 %

## 2023-04-18 DIAGNOSIS — J30.2 SEASONAL ALLERGIES: ICD-10-CM

## 2023-04-18 DIAGNOSIS — J45.20 MILD INTERMITTENT ASTHMA WITHOUT COMPLICATION: Primary | ICD-10-CM

## 2023-04-18 PROCEDURE — 3017F COLORECTAL CA SCREEN DOC REV: CPT | Performed by: NURSE PRACTITIONER

## 2023-04-18 PROCEDURE — G8427 DOCREV CUR MEDS BY ELIG CLIN: HCPCS | Performed by: NURSE PRACTITIONER

## 2023-04-18 PROCEDURE — G8420 CALC BMI NORM PARAMETERS: HCPCS | Performed by: NURSE PRACTITIONER

## 2023-04-18 PROCEDURE — G8399 PT W/DXA RESULTS DOCUMENT: HCPCS | Performed by: NURSE PRACTITIONER

## 2023-04-18 PROCEDURE — 1090F PRES/ABSN URINE INCON ASSESS: CPT | Performed by: NURSE PRACTITIONER

## 2023-04-18 PROCEDURE — 99214 OFFICE O/P EST MOD 30 MIN: CPT | Performed by: NURSE PRACTITIONER

## 2023-04-18 PROCEDURE — 1036F TOBACCO NON-USER: CPT | Performed by: NURSE PRACTITIONER

## 2023-04-18 PROCEDURE — 3075F SYST BP GE 130 - 139MM HG: CPT | Performed by: NURSE PRACTITIONER

## 2023-04-18 PROCEDURE — 3078F DIAST BP <80 MM HG: CPT | Performed by: NURSE PRACTITIONER

## 2023-04-18 PROCEDURE — 1123F ACP DISCUSS/DSCN MKR DOCD: CPT | Performed by: NURSE PRACTITIONER

## 2023-04-18 RX ORDER — LORATADINE 10 MG/1
10 TABLET ORAL DAILY
Qty: 90 TABLET | Refills: 3 | Status: SHIPPED | OUTPATIENT
Start: 2023-04-18 | End: 2023-04-18 | Stop reason: SDUPTHER

## 2023-04-18 RX ORDER — LORATADINE 10 MG/1
10 TABLET ORAL DAILY
Qty: 90 TABLET | Refills: 3 | Status: SHIPPED | OUTPATIENT
Start: 2023-04-18

## 2023-04-18 ASSESSMENT — ENCOUNTER SYMPTOMS
EYE DISCHARGE: 1
EYE PAIN: 1
DIARRHEA: 0
RHINORRHEA: 0
ABDOMINAL PAIN: 0
VOMITING: 0
CONSTIPATION: 0
CHEST TIGHTNESS: 0
WHEEZING: 0
SHORTNESS OF BREATH: 0
COUGH: 0

## 2023-04-18 ASSESSMENT — PATIENT HEALTH QUESTIONNAIRE - PHQ9
SUM OF ALL RESPONSES TO PHQ QUESTIONS 1-9: 0
1. LITTLE INTEREST OR PLEASURE IN DOING THINGS: 0
SUM OF ALL RESPONSES TO PHQ QUESTIONS 1-9: 0
SUM OF ALL RESPONSES TO PHQ9 QUESTIONS 1 & 2: 0
2. FEELING DOWN, DEPRESSED OR HOPELESS: 0

## 2023-04-18 NOTE — PROGRESS NOTES
Speech normal.         Behavior: Behavior normal.         Thought Content:  Thought content normal.         Data Reviewed and Summarized       Labs:     Imaging/Testing:        SONYA Brown - LINN

## 2023-08-19 SDOH — HEALTH STABILITY: PHYSICAL HEALTH: ON AVERAGE, HOW MANY DAYS PER WEEK DO YOU ENGAGE IN MODERATE TO STRENUOUS EXERCISE (LIKE A BRISK WALK)?: 0 DAYS

## 2023-08-19 ASSESSMENT — LIFESTYLE VARIABLES
HOW MANY STANDARD DRINKS CONTAINING ALCOHOL DO YOU HAVE ON A TYPICAL DAY: 1
HOW OFTEN DO YOU HAVE SIX OR MORE DRINKS ON ONE OCCASION: 1
HOW MANY STANDARD DRINKS CONTAINING ALCOHOL DO YOU HAVE ON A TYPICAL DAY: 1 OR 2

## 2023-08-19 ASSESSMENT — PATIENT HEALTH QUESTIONNAIRE - PHQ9
5. POOR APPETITE OR OVEREATING: 0
SUM OF ALL RESPONSES TO PHQ QUESTIONS 1-9: 7
3. TROUBLE FALLING OR STAYING ASLEEP: 1
SUM OF ALL RESPONSES TO PHQ QUESTIONS 1-9: 7
SUM OF ALL RESPONSES TO PHQ QUESTIONS 1-9: 7
SUM OF ALL RESPONSES TO PHQ9 QUESTIONS 1 & 2: 4
7. TROUBLE CONCENTRATING ON THINGS, SUCH AS READING THE NEWSPAPER OR WATCHING TELEVISION: 1
6. FEELING BAD ABOUT YOURSELF - OR THAT YOU ARE A FAILURE OR HAVE LET YOURSELF OR YOUR FAMILY DOWN: 0
1. LITTLE INTEREST OR PLEASURE IN DOING THINGS: 2
9. THOUGHTS THAT YOU WOULD BE BETTER OFF DEAD, OR OF HURTING YOURSELF: 0
8. MOVING OR SPEAKING SO SLOWLY THAT OTHER PEOPLE COULD HAVE NOTICED. OR THE OPPOSITE, BEING SO FIGETY OR RESTLESS THAT YOU HAVE BEEN MOVING AROUND A LOT MORE THAN USUAL: 0
10. IF YOU CHECKED OFF ANY PROBLEMS, HOW DIFFICULT HAVE THESE PROBLEMS MADE IT FOR YOU TO DO YOUR WORK, TAKE CARE OF THINGS AT HOME, OR GET ALONG WITH OTHER PEOPLE: 0
2. FEELING DOWN, DEPRESSED OR HOPELESS: 2
SUM OF ALL RESPONSES TO PHQ QUESTIONS 1-9: 7
4. FEELING TIRED OR HAVING LITTLE ENERGY: 1

## 2023-08-22 ENCOUNTER — OFFICE VISIT (OUTPATIENT)
Dept: FAMILY MEDICINE CLINIC | Age: 73
End: 2023-08-22
Payer: MEDICARE

## 2023-08-22 VITALS
TEMPERATURE: 97.5 F | SYSTOLIC BLOOD PRESSURE: 140 MMHG | BODY MASS INDEX: 24 KG/M2 | HEIGHT: 62 IN | DIASTOLIC BLOOD PRESSURE: 82 MMHG | HEART RATE: 66 BPM | RESPIRATION RATE: 16 BRPM | WEIGHT: 130.4 LBS | OXYGEN SATURATION: 98 %

## 2023-08-22 DIAGNOSIS — I10 ESSENTIAL HYPERTENSION: ICD-10-CM

## 2023-08-22 DIAGNOSIS — J45.20 MILD INTERMITTENT ASTHMA WITHOUT COMPLICATION: ICD-10-CM

## 2023-08-22 DIAGNOSIS — J30.2 SEASONAL ALLERGIES: ICD-10-CM

## 2023-08-22 DIAGNOSIS — Z00.00 MEDICARE ANNUAL WELLNESS VISIT, SUBSEQUENT: Primary | ICD-10-CM

## 2023-08-22 DIAGNOSIS — F43.21 GRIEVING: ICD-10-CM

## 2023-08-22 DIAGNOSIS — J45.21 MILD INTERMITTENT ASTHMATIC BRONCHITIS WITH ACUTE EXACERBATION: ICD-10-CM

## 2023-08-22 PROCEDURE — 3017F COLORECTAL CA SCREEN DOC REV: CPT | Performed by: NURSE PRACTITIONER

## 2023-08-22 PROCEDURE — G0439 PPPS, SUBSEQ VISIT: HCPCS | Performed by: NURSE PRACTITIONER

## 2023-08-22 PROCEDURE — 3079F DIAST BP 80-89 MM HG: CPT | Performed by: NURSE PRACTITIONER

## 2023-08-22 PROCEDURE — 1123F ACP DISCUSS/DSCN MKR DOCD: CPT | Performed by: NURSE PRACTITIONER

## 2023-08-22 PROCEDURE — 3077F SYST BP >= 140 MM HG: CPT | Performed by: NURSE PRACTITIONER

## 2023-08-22 RX ORDER — ALBUTEROL SULFATE 90 UG/1
2 AEROSOL, METERED RESPIRATORY (INHALATION) 4 TIMES DAILY PRN
Qty: 18 G | Refills: 0 | Status: SHIPPED | OUTPATIENT
Start: 2023-08-22

## 2023-08-22 NOTE — PROGRESS NOTES
Yes Historical Provider, MD   Tafluprost, PF, (ZIOPTAN) 0.0015 % SOLN Apply 1 drop to eye nightly Yes Historical Provider, MD   Netarsudil Dimesylate (RHOPRESSA) 0.02 % SOLN Apply 1 drop to eye daily Right eye Yes Historical Provider, MD   dorzolamide (TRUSOPT) 2 % ophthalmic solution Place 1 drop into both eyes 2 times daily Yes Historical Provider, MD   lisinopril-hydroCHLOROthiazide (PRINZIDE;ZESTORETIC) 10-12.5 MG per tablet TAKE 1 TABLET BY MOUTH DAILY  SONYA Peng CNP       CareTeam (Including outside providers/suppliers regularly involved in providing care):   Patient Care Team:  SONYA Peng CNP as PCP - General (Family Medicine)  SONYA Peng CNP as PCP - Empaneled Provider     Reviewed and updated this visit:  Tobacco  Allergies  Meds  Med Hx  Surg Hx  Soc Hx  Fam Hx

## 2023-11-14 ENCOUNTER — NURSE ONLY (OUTPATIENT)
Dept: FAMILY MEDICINE CLINIC | Age: 73
End: 2023-11-14
Payer: MEDICARE

## 2023-11-14 ENCOUNTER — HOSPITAL ENCOUNTER (OUTPATIENT)
Age: 73
Setting detail: SPECIMEN
Discharge: HOME | End: 2023-11-14

## 2023-11-14 DIAGNOSIS — I10 ESSENTIAL HYPERTENSION, MALIGNANT: Primary | ICD-10-CM

## 2023-11-14 DIAGNOSIS — I10 ESSENTIAL HYPERTENSION: ICD-10-CM

## 2023-11-14 LAB
BASOPHILS # BLD: 0.07 K/UL (ref 0–0.2)
BASOPHILS NFR BLD: 1 % (ref 0–2)
EOSINOPHIL # BLD: 0.24 K/UL (ref 0–0.44)
EOSINOPHILS RELATIVE PERCENT: 4 % (ref 1–4)
ERYTHROCYTE [DISTWIDTH] IN BLOOD BY AUTOMATED COUNT: 13.2 % (ref 11.8–14.4)
HCT VFR BLD AUTO: 41.7 % (ref 36.3–47.1)
HGB BLD-MCNC: 14 G/DL (ref 11.9–15.1)
IMM GRANULOCYTES # BLD AUTO: <0.03 K/UL (ref 0–0.3)
IMM GRANULOCYTES NFR BLD: 0 %
LYMPHOCYTES NFR BLD: 1.75 K/UL (ref 1.1–3.7)
LYMPHOCYTES RELATIVE PERCENT: 26 % (ref 24–43)
MCH RBC QN AUTO: 31 PG (ref 25.2–33.5)
MCHC RBC AUTO-ENTMCNC: 33.6 G/DL (ref 28.4–34.8)
MCV RBC AUTO: 92.5 FL (ref 82.6–102.9)
MONOCYTES NFR BLD: 0.6 K/UL (ref 0.1–1.2)
MONOCYTES NFR BLD: 9 % (ref 3–12)
NEUTROPHILS NFR BLD: 60 % (ref 36–65)
NEUTS SEG NFR BLD: 3.96 K/UL (ref 1.5–8.1)
NRBC BLD-RTO: 0 PER 100 WBC
PLATELET # BLD AUTO: 274 K/UL (ref 138–453)
PMV BLD AUTO: 12.2 FL (ref 8.1–13.5)
RBC # BLD AUTO: 4.51 M/UL (ref 3.95–5.11)
WBC OTHER # BLD: 6.6 K/UL (ref 3.5–11.3)

## 2023-11-14 PROCEDURE — 36415 COLL VENOUS BLD VENIPUNCTURE: CPT | Performed by: NURSE PRACTITIONER

## 2023-11-15 LAB
ALBUMIN SERPL-MCNC: 4.2 G/DL (ref 3.5–5.2)
ALBUMIN/GLOB SERPL: 1.7 {RATIO} (ref 1–2.5)
ALP SERPL-CCNC: 52 U/L (ref 35–104)
ALT SERPL-CCNC: 23 U/L (ref 5–33)
ANION GAP SERPL CALCULATED.3IONS-SCNC: 10 MMOL/L (ref 9–17)
AST SERPL-CCNC: 23 U/L
BILIRUB SERPL-MCNC: 0.6 MG/DL (ref 0.3–1.2)
BUN SERPL-MCNC: 16 MG/DL (ref 8–23)
CALCIUM SERPL-MCNC: 9.7 MG/DL (ref 8.6–10.4)
CHLORIDE SERPL-SCNC: 104 MMOL/L (ref 98–107)
CHOLEST SERPL-MCNC: 222 MG/DL
CHOLESTEROL/HDL RATIO: 2.7
CO2 SERPL-SCNC: 28 MMOL/L (ref 20–31)
CREAT SERPL-MCNC: 0.8 MG/DL (ref 0.5–0.9)
GFR SERPL CREATININE-BSD FRML MDRD: >60 ML/MIN/1.73M2
GLUCOSE P FAST SERPL-MCNC: 95 MG/DL (ref 70–99)
HDLC SERPL-MCNC: 81 MG/DL
LDLC SERPL CALC-MCNC: 126 MG/DL (ref 0–130)
MAGNESIUM SERPL-MCNC: 1.9 MG/DL (ref 1.6–2.6)
POTASSIUM SERPL-SCNC: 3.6 MMOL/L (ref 3.7–5.3)
PROT SERPL-MCNC: 6.7 G/DL (ref 6.4–8.3)
SODIUM SERPL-SCNC: 142 MMOL/L (ref 135–144)
TRIGL SERPL-MCNC: 77 MG/DL
TSH SERPL DL<=0.05 MIU/L-ACNC: 1.3 UIU/ML (ref 0.3–5)

## 2023-11-27 ENCOUNTER — OFFICE VISIT (OUTPATIENT)
Dept: FAMILY MEDICINE CLINIC | Age: 73
End: 2023-11-27

## 2023-11-27 ENCOUNTER — ANCILLARY PROCEDURE (OUTPATIENT)
Dept: FAMILY MEDICINE CLINIC | Age: 73
End: 2023-11-27
Payer: MEDICARE

## 2023-11-27 VITALS
BODY MASS INDEX: 24.14 KG/M2 | WEIGHT: 132 LBS | RESPIRATION RATE: 16 BRPM | DIASTOLIC BLOOD PRESSURE: 78 MMHG | HEART RATE: 68 BPM | TEMPERATURE: 97.1 F | OXYGEN SATURATION: 98 % | SYSTOLIC BLOOD PRESSURE: 142 MMHG

## 2023-11-27 DIAGNOSIS — M54.31 RIGHT SIDED SCIATICA: ICD-10-CM

## 2023-11-27 DIAGNOSIS — B00.9 HERPES: ICD-10-CM

## 2023-11-27 DIAGNOSIS — M15.9 PRIMARY OSTEOARTHRITIS INVOLVING MULTIPLE JOINTS: ICD-10-CM

## 2023-11-27 DIAGNOSIS — M65.311 TRIGGER THUMB, RIGHT THUMB: ICD-10-CM

## 2023-11-27 DIAGNOSIS — J30.2 SEASONAL ALLERGIES: ICD-10-CM

## 2023-11-27 DIAGNOSIS — I10 ESSENTIAL HYPERTENSION: Primary | ICD-10-CM

## 2023-11-27 DIAGNOSIS — Z23 NEED FOR IMMUNIZATION AGAINST INFLUENZA: ICD-10-CM

## 2023-11-27 DIAGNOSIS — M65.4 DE QUERVAIN'S TENOSYNOVITIS: ICD-10-CM

## 2023-11-27 PROCEDURE — 73502 X-RAY EXAM HIP UNI 2-3 VIEWS: CPT

## 2023-11-27 RX ORDER — NEOMYCIN SULFATE, POLYMYXIN B SULFATE, AND DEXAMETHASONE 3.5; 10000; 1 MG/G; [USP'U]/G; MG/G
OINTMENT OPHTHALMIC
COMMUNITY
Start: 2023-10-25

## 2023-11-27 RX ORDER — PRIMIDONE 50 MG/1
TABLET ORAL
COMMUNITY

## 2023-11-27 RX ORDER — VALACYCLOVIR HYDROCHLORIDE 1 G/1
1000 TABLET, FILM COATED ORAL DAILY
Qty: 5 TABLET | Refills: 0 | Status: SHIPPED | OUTPATIENT
Start: 2023-11-27 | End: 2023-12-02

## 2023-11-27 RX ORDER — NAPROXEN 500 MG/1
500 TABLET ORAL 2 TIMES DAILY WITH MEALS
Qty: 20 TABLET | Refills: 0 | Status: SHIPPED | OUTPATIENT
Start: 2023-11-27 | End: 2023-12-07

## 2023-11-27 ASSESSMENT — ENCOUNTER SYMPTOMS
RHINORRHEA: 0
ABDOMINAL PAIN: 0
EYE DISCHARGE: 0
DIARRHEA: 0
CONSTIPATION: 0
CHEST TIGHTNESS: 0
COUGH: 0
SHORTNESS OF BREATH: 0
VOMITING: 0

## 2023-11-27 ASSESSMENT — PATIENT HEALTH QUESTIONNAIRE - PHQ9
SUM OF ALL RESPONSES TO PHQ QUESTIONS 1-9: 0
2. FEELING DOWN, DEPRESSED OR HOPELESS: 0
SUM OF ALL RESPONSES TO PHQ9 QUESTIONS 1 & 2: 0
SUM OF ALL RESPONSES TO PHQ QUESTIONS 1-9: 0
SUM OF ALL RESPONSES TO PHQ QUESTIONS 1-9: 0
1. LITTLE INTEREST OR PLEASURE IN DOING THINGS: 0
SUM OF ALL RESPONSES TO PHQ QUESTIONS 1-9: 0

## 2023-11-27 NOTE — PROGRESS NOTES
Loreta Hernández [address], [place] [address] [address]  Dept: [phone]  Dept Fax: [phone]    Visit type: Established patient    Reason for Visit: Hypertension (13W f/u), Health Maintenance (Vaccines ), Finger Pain (R thumb /Has been there a \"long time\" but hurting more now ), and Leg Pain (R leg x couple months )         Assessment and Plan       1. Essential hypertension  2. Seasonal allergies  3. De Quervain's tenosynovitis  4. Trigger thumb, right thumb  -     naproxen (NAPROSYN) 500 MG tablet; Take 1 tablet by mouth 2 times daily (with meals) for 10 days, Disp-20 tablet, R-0Normal  5. Primary osteoarthritis involving multiple joints  -     naproxen (NAPROSYN) 500 MG tablet; Take 1 tablet by mouth 2 times daily (with meals) for 10 days, Disp-20 tablet, R-0Normal  6. Right sided sciatica  -     naproxen (NAPROSYN) 500 MG tablet; Take 1 tablet by mouth 2 times daily (with meals) for 10 days, Disp-20 tablet, R-0Normal  -     XR HIP 2-3 VW W PELVIS RIGHT; Future  7. Herpes  -     valACYclovir (VALTREX) 1 g tablet; Take 1 tablet by mouth daily for 5 days, Disp-5 tablet, R-0Normal  8. Need for immunization against influenza  -     Influenza, FLUAD, (age 72 y+), IM, Preservative Free, 0.5 mL    Stretches for sciatica- xray today  Will start NSAIDS  Also can get voltaren gel OTC for right thumb  Declines PT for now  BP slightly elevated- will have her monitor at home- if consistently above 140/90 will contact office   Return in about 3 months (around 2/27/2024) for chronic issues review. Subjective       HTN  Onset over a year ago  Is taking dual therapy  CP or palpitations? No  SOB With exertion? Chronic not worse    Seasonal allergies  [place] over a year ago  Treatment- singulair, claritin    Right thumb pain  Onset over a year ago  Known injury?  No  Locks up at times       Right leg pain  Onset months ago   Stares in hips and radiates down legs  No loss of bowel or

## 2023-11-27 NOTE — PROGRESS NOTES
After obtaining consent, and per orders of Alexa Claros CNP, injection of FluAD 0.5mL given in Right deltoid by Jose G June MA. Patient instructed to remain in clinic for 20 minutes afterwards, and to report any adverse reaction to me immediately. Immunizations Administered       Name Date Dose Route    Influenza, FLUAD, (age 72 y+), Adjuvanted, 0.5mL 11/27/2023 0.5 mL Intramuscular    Site: Deltoid- Right    Lot: [id number]    NDC: 36613-667-32                Pt tolerated well. VIS was given. Vaccine Information Sheet, \"Influenza - Inactivated\"  given to Yung Arevalo, or parent/legal guardian of  Yung Arevalo and verbalized understanding. Patient responses:    Have you ever had a reaction to a flu vaccine? No  Do you have an allergy to eggs, neomycin or polymixin? No  Do you have an allergy to Thimerosal, contact lens solution, or Merthiolate? No  Have you ever had Guillian Stockbridge Syndrome? No  Do you have any current illness? No  Do you have a temperature above 100 degrees? No  Are you pregnant? No  If pregnant, permission obtained from physician? N/A  Do you have an active neurological disorder? No      Flu vaccine given per order. Please see immunization tab.

## 2024-01-15 RX ORDER — LISINOPRIL AND HYDROCHLOROTHIAZIDE 12.5; 1 MG/1; MG/1
1 TABLET ORAL DAILY
Qty: 90 TABLET | Refills: 3 | Status: SHIPPED | OUTPATIENT
Start: 2024-01-15 | End: 2025-01-09

## 2024-02-22 DIAGNOSIS — E78.2 MIXED HYPERLIPIDEMIA: ICD-10-CM

## 2024-02-22 RX ORDER — ATORVASTATIN CALCIUM 10 MG/1
10 TABLET, FILM COATED ORAL DAILY
Qty: 90 TABLET | Refills: 3 | Status: SHIPPED | OUTPATIENT
Start: 2024-02-22

## 2024-02-27 ENCOUNTER — OFFICE VISIT (OUTPATIENT)
Dept: FAMILY MEDICINE CLINIC | Age: 74
End: 2024-02-27
Payer: MEDICARE

## 2024-02-27 VITALS
HEART RATE: 70 BPM | SYSTOLIC BLOOD PRESSURE: 146 MMHG | DIASTOLIC BLOOD PRESSURE: 88 MMHG | OXYGEN SATURATION: 97 % | WEIGHT: 125.8 LBS | RESPIRATION RATE: 16 BRPM | BODY MASS INDEX: 23.01 KG/M2

## 2024-02-27 DIAGNOSIS — I10 ESSENTIAL HYPERTENSION: ICD-10-CM

## 2024-02-27 DIAGNOSIS — J38.3 VOCAL CORD DYSFUNCTION: Primary | ICD-10-CM

## 2024-02-27 DIAGNOSIS — J45.20 MILD INTERMITTENT ASTHMA WITHOUT COMPLICATION: ICD-10-CM

## 2024-02-27 DIAGNOSIS — Z23 NEED FOR PNEUMOCOCCAL 20-VALENT CONJUGATE VACCINATION: ICD-10-CM

## 2024-02-27 DIAGNOSIS — B00.1 RECURRENT HERPES LABIALIS: ICD-10-CM

## 2024-02-27 DIAGNOSIS — M54.31 RIGHT SIDED SCIATICA: ICD-10-CM

## 2024-02-27 PROCEDURE — 3017F COLORECTAL CA SCREEN DOC REV: CPT | Performed by: NURSE PRACTITIONER

## 2024-02-27 PROCEDURE — 3078F DIAST BP <80 MM HG: CPT | Performed by: NURSE PRACTITIONER

## 2024-02-27 PROCEDURE — G8484 FLU IMMUNIZE NO ADMIN: HCPCS | Performed by: NURSE PRACTITIONER

## 2024-02-27 PROCEDURE — 1090F PRES/ABSN URINE INCON ASSESS: CPT | Performed by: NURSE PRACTITIONER

## 2024-02-27 PROCEDURE — 3077F SYST BP >= 140 MM HG: CPT | Performed by: NURSE PRACTITIONER

## 2024-02-27 PROCEDURE — G8420 CALC BMI NORM PARAMETERS: HCPCS | Performed by: NURSE PRACTITIONER

## 2024-02-27 PROCEDURE — 99215 OFFICE O/P EST HI 40 MIN: CPT | Performed by: NURSE PRACTITIONER

## 2024-02-27 PROCEDURE — G8427 DOCREV CUR MEDS BY ELIG CLIN: HCPCS | Performed by: NURSE PRACTITIONER

## 2024-02-27 PROCEDURE — G8399 PT W/DXA RESULTS DOCUMENT: HCPCS | Performed by: NURSE PRACTITIONER

## 2024-02-27 PROCEDURE — 1036F TOBACCO NON-USER: CPT | Performed by: NURSE PRACTITIONER

## 2024-02-27 PROCEDURE — G0009 ADMIN PNEUMOCOCCAL VACCINE: HCPCS | Performed by: NURSE PRACTITIONER

## 2024-02-27 PROCEDURE — 90677 PCV20 VACCINE IM: CPT | Performed by: NURSE PRACTITIONER

## 2024-02-27 PROCEDURE — 1123F ACP DISCUSS/DSCN MKR DOCD: CPT | Performed by: NURSE PRACTITIONER

## 2024-02-27 RX ORDER — VALACYCLOVIR HYDROCHLORIDE 1 G/1
1000 TABLET, FILM COATED ORAL DAILY
Qty: 90 TABLET | Refills: 3 | Status: SHIPPED | OUTPATIENT
Start: 2024-02-27

## 2024-02-27 RX ORDER — LISINOPRIL AND HYDROCHLOROTHIAZIDE 20; 12.5 MG/1; MG/1
1 TABLET ORAL DAILY
Qty: 90 TABLET | Refills: 1 | Status: SHIPPED | OUTPATIENT
Start: 2024-02-27

## 2024-02-27 ASSESSMENT — PATIENT HEALTH QUESTIONNAIRE - PHQ9
2. FEELING DOWN, DEPRESSED OR HOPELESS: 0
1. LITTLE INTEREST OR PLEASURE IN DOING THINGS: 0
SUM OF ALL RESPONSES TO PHQ QUESTIONS 1-9: 0
SUM OF ALL RESPONSES TO PHQ9 QUESTIONS 1 & 2: 0
SUM OF ALL RESPONSES TO PHQ QUESTIONS 1-9: 0

## 2024-02-27 ASSESSMENT — ENCOUNTER SYMPTOMS
CONSTIPATION: 0
ABDOMINAL PAIN: 0
VOICE CHANGE: 1
VOMITING: 0
EYE DISCHARGE: 0
COUGH: 0
SHORTNESS OF BREATH: 0
DIARRHEA: 0
CHEST TIGHTNESS: 0
RHINORRHEA: 0

## 2024-02-27 NOTE — PROGRESS NOTES
Second verification checked by Ashish Lee LPN.    After obtaining consent, and per orders of yakelin santos cnp, injection of [id number] 0.5mL given in Left deltoid by Sonja Dos Santos MA. Patient instructed to remain in clinic for 20 minutes afterwards, and to report any adverse reaction to me immediately.      Immunizations Administered       Name Date Dose Route    Pneumococcal, PCV20, PREVNAR 20, (age 6w+), IM, 0.5mL 2/27/2024 0.5 mL Intramuscular    Site: Deltoid- Left    Lot: [id number]    NDC: 2546-1265-78                Pt tolerated well. VIS was given.

## 2024-02-27 NOTE — PROGRESS NOTES
.   [facility], [facility]  [address].  [place] [address]  Dept: [phone]  Dept Fax: [phone]    Visit type: Established patient    Reason for Visit: Hypertension (3m f/u ), Asthma (3m f/u ), Health Maintenance (Vaccines ), and Discuss Labs (Xray of leg )         Assessment and Plan       1. Vocal cord dysfunction  2. Essential hypertension  -     lisinopril-hydroCHLOROthiazide (PRINZIDE;ZESTORETIC) 20-12.5 MG per tablet; Take 1 tablet by mouth daily, Disp-90 tablet, R-1Normal  3. Need for pneumococcal 20-valent conjugate vaccination  -     Pneumococcal, PCV20, PREVNAR 20, (age 6w+), IM, PF  4. Mild intermittent asthma without complication  5. Right sided sciatica  6. Recurrent herpes labialis  -     valACYclovir (VALTREX) 1 g tablet; Take 1 tablet by mouth daily, Disp-90 tablet, R-3Normal  BP uncontrolled- log reviewed and will be scanned   Will get pneumonia today  Is going to get second dose of shingles, RSV and tdap at pharmacy   Declines ENT referral- vocal cord dysfunction improving- is working on breathing techniques   Wanted to review xray of hip- continue exercises at home  Will start suppression for herpes    Return in about 3 months (around 5/27/2024) for chronic issue review- 2 week BP check with ELIE Honeycutt       Joined lima symphojonnathan crowell since last visit states that she had vocal cord dysfunction. This has been going on since January 8th. Is getting better overall, and declines ENT referral      States that the weather makes her happy and she is getting out doing things    HTN  Onset over a year ago  BP at home are not controlled   Is taking dual therapy  No CP or palpitations  No SOB with exertion    Asthma  Onset over a year ago  States that she is trying not to take her inhaler because it affects her singing  Has albuterol to take as needed  Is also taking claritin, singulair  Trigger- cleaning basement which is edy     Right sided

## 2024-03-11 RX ORDER — MONTELUKAST SODIUM 10 MG/1
10 TABLET ORAL DAILY
Qty: 90 TABLET | Refills: 3 | Status: SHIPPED | OUTPATIENT
Start: 2024-03-11

## 2024-03-19 ENCOUNTER — NURSE ONLY (OUTPATIENT)
Dept: FAMILY MEDICINE CLINIC | Age: 74
End: 2024-03-19

## 2024-03-19 VITALS — DIASTOLIC BLOOD PRESSURE: 86 MMHG | SYSTOLIC BLOOD PRESSURE: 128 MMHG

## 2024-03-19 DIAGNOSIS — Z01.30 BLOOD PRESSURE CHECK: Primary | ICD-10-CM

## 2024-03-19 NOTE — PROGRESS NOTES
BP Readings from Last 3 Encounters:   03/19/24 128/86   02/27/24 (!) 146/88   11/27/23 (!) 142/78

## 2024-04-29 ENCOUNTER — OFFICE VISIT (OUTPATIENT)
Dept: FAMILY MEDICINE CLINIC | Age: 74
End: 2024-04-29

## 2024-04-29 VITALS
DIASTOLIC BLOOD PRESSURE: 84 MMHG | OXYGEN SATURATION: 97 % | HEART RATE: 73 BPM | SYSTOLIC BLOOD PRESSURE: 136 MMHG | BODY MASS INDEX: 22.17 KG/M2 | WEIGHT: 121.2 LBS | RESPIRATION RATE: 14 BRPM | TEMPERATURE: 96.9 F

## 2024-04-29 DIAGNOSIS — J02.9 SORE THROAT: Primary | ICD-10-CM

## 2024-04-29 DIAGNOSIS — J30.2 SEASONAL ALLERGIES: ICD-10-CM

## 2024-04-29 DIAGNOSIS — J06.9 VIRAL URI WITH COUGH: ICD-10-CM

## 2024-04-29 DIAGNOSIS — J20.8 VIRAL BRONCHITIS: ICD-10-CM

## 2024-04-29 DIAGNOSIS — I10 ELEVATED BLOOD PRESSURE READING IN OFFICE WITH DIAGNOSIS OF HYPERTENSION: ICD-10-CM

## 2024-04-29 LAB — STREPTOCOCCUS A RNA: NORMAL

## 2024-04-29 RX ORDER — LORATADINE 10 MG/1
10 TABLET ORAL DAILY
Qty: 90 TABLET | Refills: 3 | Status: SHIPPED | OUTPATIENT
Start: 2024-04-29

## 2024-04-29 RX ORDER — FLUTICASONE PROPIONATE 50 MCG
2 SPRAY, SUSPENSION (ML) NASAL DAILY
Qty: 16 G | Refills: 0 | Status: SHIPPED | OUTPATIENT
Start: 2024-04-29

## 2024-04-29 SDOH — ECONOMIC STABILITY: FOOD INSECURITY: WITHIN THE PAST 12 MONTHS, THE FOOD YOU BOUGHT JUST DIDN'T LAST AND YOU DIDN'T HAVE MONEY TO GET MORE.: NEVER TRUE

## 2024-04-29 SDOH — ECONOMIC STABILITY: FOOD INSECURITY: WITHIN THE PAST 12 MONTHS, YOU WORRIED THAT YOUR FOOD WOULD RUN OUT BEFORE YOU GOT MONEY TO BUY MORE.: NEVER TRUE

## 2024-04-29 SDOH — ECONOMIC STABILITY: INCOME INSECURITY: HOW HARD IS IT FOR YOU TO PAY FOR THE VERY BASICS LIKE FOOD, HOUSING, MEDICAL CARE, AND HEATING?: NOT HARD AT ALL

## 2024-04-29 ASSESSMENT — ENCOUNTER SYMPTOMS
SINUS PRESSURE: 1
SHORTNESS OF BREATH: 0
CHEST TIGHTNESS: 0
EYE DISCHARGE: 0
DIARRHEA: 0
CONSTIPATION: 0
RHINORRHEA: 1
ABDOMINAL PAIN: 0
VOMITING: 0
SORE THROAT: 1
COUGH: 1

## 2024-04-29 ASSESSMENT — PATIENT HEALTH QUESTIONNAIRE - PHQ9
SUM OF ALL RESPONSES TO PHQ QUESTIONS 1-9: 0
1. LITTLE INTEREST OR PLEASURE IN DOING THINGS: NOT AT ALL
2. FEELING DOWN, DEPRESSED OR HOPELESS: NOT AT ALL
SUM OF ALL RESPONSES TO PHQ QUESTIONS 1-9: 0
SUM OF ALL RESPONSES TO PHQ9 QUESTIONS 1 & 2: 0

## 2024-04-29 NOTE — PROGRESS NOTES
Health Maintenance Due   Topic Date Due    DTaP/Tdap/Td vaccine (1 - Tdap) Never done    Respiratory Syncytial Virus (RSV) Pregnant or age 60 yrs+ (1 - 1-dose 60+ series) Never done    Shingles vaccine (2 of 2) 03/04/2020

## 2024-04-29 NOTE — PROGRESS NOTES
.   [facility]  [address] [place] [address].  [facility] [address]  Dept: [phone]  Dept Fax: [phone]    Visit type: Established patient    Reason for Visit: Sore Throat (Started a couple of days ago ), Cough (With phlegm ), and Health Maintenance (See note )      Assessment & Plan   Assessment and Plan       1. Sore throat  -     POCT Rapid Strep A DNA (Alere i)  -     fluticasone (FLONASE) 50 MCG/ACT nasal spray; 2 sprays by Each Nostril route daily, Disp-16 g, R-0Normal  2. Viral URI with cough  -     fluticasone (FLONASE) 50 MCG/ACT nasal spray; 2 sprays by Each Nostril route daily, Disp-16 g, R-0Normal  3. Viral bronchitis  4. Seasonal allergies  -     loratadine (CLARITIN) 10 MG tablet; Take 1 tablet by mouth daily, Disp-90 tablet, R-3Normal  5. Elevated blood pressure reading in office with diagnosis of hypertension    Can start mucinex at home  Add Flonase  Continue Singulair and Claritin  Rest, increase fluids especially water  Point-of-care strep testing is negative  Warm salt water gargles or Chloraseptic throat spray  BP was initially elevated but then did come down to stage I.  Should continue her current blood pressure medication and not take over-the-counter cough and cold without discussion with PCP first    Return if symptoms worsen or fail to improve.       Subjective       Throat pain  Onset x 3 nights ago  Had scratchy throat that was itching  Tried to drink water  However this happened again especially at night  Tried gargling salt water  This am when she woke up  Has some chest congestion, rare cough  Yellow phlegm     History of hypertension.  Is currently taking lisinopril hydrochlorothiazide.  No chest pain or palpitations.  No shortness of breath with exertion.         Review of Systems   Constitutional:  Positive for fever. Negative for activity change and appetite change.   HENT:  Positive for congestion, postnasal drip, rhinorrhea, sinus pressure and sore throat. Negative

## 2024-05-16 ENCOUNTER — APPOINTMENT (OUTPATIENT)
Dept: GENERAL RADIOLOGY | Age: 74
End: 2024-05-16
Payer: MEDICARE

## 2024-05-16 ENCOUNTER — HOSPITAL ENCOUNTER (EMERGENCY)
Age: 74
Discharge: HOME | End: 2024-05-16
Payer: MEDICARE

## 2024-05-16 VITALS
DIASTOLIC BLOOD PRESSURE: 75 MMHG | HEART RATE: 73 BPM | TEMPERATURE: 98.2 F | OXYGEN SATURATION: 97 % | RESPIRATION RATE: 15 BRPM | SYSTOLIC BLOOD PRESSURE: 145 MMHG

## 2024-05-16 DIAGNOSIS — R05.8 POST-VIRAL COUGH SYNDROME: Primary | ICD-10-CM

## 2024-05-16 LAB
ANION GAP SERPL CALC-SCNC: 15 MEQ/L (ref 8–16)
BASOPHILS ABSOLUTE: 0.1 THOU/MM3 (ref 0–0.1)
BASOPHILS NFR BLD AUTO: 0.7 %
BUN SERPL-MCNC: 15 MG/DL (ref 7–22)
CALCIUM SERPL-MCNC: 10.1 MG/DL (ref 8.5–10.5)
CHLORIDE SERPL-SCNC: 104 MEQ/L (ref 98–111)
CO2 SERPL-SCNC: 24 MEQ/L (ref 23–33)
CREAT SERPL-MCNC: 0.6 MG/DL (ref 0.4–1.2)
DEPRECATED RDW RBC AUTO: 50.6 FL (ref 35–45)
EKG ATRIAL RATE: 80 BPM
EKG P AXIS: 74 DEGREES
EKG P-R INTERVAL: 162 MS
EKG Q-T INTERVAL: 372 MS
EKG QRS DURATION: 86 MS
EKG QTC CALCULATION (BAZETT): 429 MS
EKG R AXIS: -24 DEGREES
EKG T AXIS: 65 DEGREES
EKG VENTRICULAR RATE: 80 BPM
EOSINOPHIL NFR BLD AUTO: 2.1 %
EOSINOPHILS ABSOLUTE: 0.2 THOU/MM3 (ref 0–0.4)
ERYTHROCYTE [DISTWIDTH] IN BLOOD BY AUTOMATED COUNT: 14.5 % (ref 11.5–14.5)
GFR SERPL CREATININE-BSD FRML MDRD: > 90 ML/MIN/1.73M2
GLUCOSE SERPL-MCNC: 90 MG/DL (ref 70–108)
HCT VFR BLD AUTO: 39.4 % (ref 37–47)
HGB BLD-MCNC: 14 GM/DL (ref 12–16)
IMM GRANULOCYTES # BLD AUTO: 0.03 THOU/MM3 (ref 0–0.07)
IMM GRANULOCYTES NFR BLD AUTO: 0.3 %
LYMPHOCYTES ABSOLUTE: 1.2 THOU/MM3 (ref 1–4.8)
LYMPHOCYTES NFR BLD AUTO: 12.8 %
MCH RBC QN AUTO: 34 PG (ref 26–33)
MCHC RBC AUTO-ENTMCNC: 35.5 GM/DL (ref 32.2–35.5)
MCV RBC AUTO: 95.6 FL (ref 81–99)
MONOCYTES ABSOLUTE: 0.6 THOU/MM3 (ref 0.4–1.3)
MONOCYTES NFR BLD AUTO: 7.2 %
NEUTROPHILS ABSOLUTE: 6.9 THOU/MM3 (ref 1.8–7.7)
NEUTROPHILS NFR BLD AUTO: 76.9 %
NRBC BLD AUTO-RTO: 0 /100 WBC
NT-PROBNP SERPL IA-MCNC: 155 PG/ML (ref 0–449)
OSMOLALITY SERPL CALC.SUM OF ELEC: 285.3 MOSMOL/KG (ref 275–300)
PLATELET # BLD AUTO: 312 THOU/MM3 (ref 130–400)
PMV BLD AUTO: 10.4 FL (ref 9.4–12.4)
POTASSIUM SERPL-SCNC: 3.5 MEQ/L (ref 3.5–5.2)
RBC # BLD AUTO: 4.12 MILL/MM3 (ref 4.2–5.4)
SODIUM SERPL-SCNC: 143 MEQ/L (ref 135–145)
TROPONIN, HIGH SENSITIVITY: 9 NG/L (ref 0–12)
TSH SERPL DL<=0.005 MIU/L-ACNC: 1.31 UIU/ML (ref 0.4–4.2)
WBC # BLD AUTO: 9 THOU/MM3 (ref 4.8–10.8)

## 2024-05-16 PROCEDURE — 85025 COMPLETE CBC W/AUTO DIFF WBC: CPT

## 2024-05-16 PROCEDURE — 80048 BASIC METABOLIC PNL TOTAL CA: CPT

## 2024-05-16 PROCEDURE — 93005 ELECTROCARDIOGRAM TRACING: CPT | Performed by: NURSE PRACTITIONER

## 2024-05-16 PROCEDURE — 84443 ASSAY THYROID STIM HORMONE: CPT

## 2024-05-16 PROCEDURE — 36415 COLL VENOUS BLD VENIPUNCTURE: CPT

## 2024-05-16 PROCEDURE — 99285 EMERGENCY DEPT VISIT HI MDM: CPT

## 2024-05-16 PROCEDURE — 83880 ASSAY OF NATRIURETIC PEPTIDE: CPT

## 2024-05-16 PROCEDURE — 93010 ELECTROCARDIOGRAM REPORT: CPT | Performed by: INTERNAL MEDICINE

## 2024-05-16 PROCEDURE — 6370000000 HC RX 637 (ALT 250 FOR IP): Performed by: NURSE PRACTITIONER

## 2024-05-16 PROCEDURE — 84484 ASSAY OF TROPONIN QUANT: CPT

## 2024-05-16 PROCEDURE — 71046 X-RAY EXAM CHEST 2 VIEWS: CPT

## 2024-05-16 RX ORDER — BENZONATATE 100 MG/1
100 CAPSULE ORAL ONCE
Status: COMPLETED | OUTPATIENT
Start: 2024-05-16 | End: 2024-05-16

## 2024-05-16 RX ORDER — BENZONATATE 100 MG/1
100 CAPSULE ORAL 3 TIMES DAILY PRN
Qty: 30 CAPSULE | Refills: 0 | Status: SHIPPED | OUTPATIENT
Start: 2024-05-16 | End: 2024-05-23

## 2024-05-16 RX ADMIN — BENZONATATE 100 MG: 100 CAPSULE ORAL at 07:28

## 2024-05-16 ASSESSMENT — PAIN - FUNCTIONAL ASSESSMENT: PAIN_FUNCTIONAL_ASSESSMENT: NONE - DENIES PAIN

## 2024-05-16 NOTE — ED PROVIDER NOTES
by myself.     CRITICAL CARE:   None    CONSULTS:  None    PROCEDURES:  None    FINAL IMPRESSION     1. Post-viral cough syndrome          DISPOSITION/PLAN   Patient discharged  PATIENT REFERREDTO:  Kristie Celaya APRN - CNP  [address]  [facility] [address]  [phone]    Go in 1 week  If symptoms worsen      DISCHARGE MEDICATIONS:  Discharge Medication List as of 5/16/2024  7:40 AM        START taking these medications    Details   benzonatate (TESSALON PERLES) 100 MG capsule Take 1 capsule by mouth 3 times daily as needed for Cough, Disp-30 capsule, R-0Normal             (Please note that portions of this note were completed with a voice recognition program.  Efforts were made to edit the dictations but occasionally words are mis-transcribed.)        Provider:  I personally performed the services described in the documentation,reviewed and edited the documentation which was dictated to the scribe in my presence, and it accurately records my words and actions.    Nathan Christensen CNP 05/16/24 12:36 PM    SONYA Lora CNP, Casey, APRN - CNP  05/16/24 1113

## 2024-05-16 NOTE — ED TRIAGE NOTES
Patient presents to ED with c/o shortness of breath that woke her up around 0100 this morning. States that she has had a cough for the past couple of months and thinks that maybe the RSV and covid vaccination that she got yesterday might be making it worse. States that this morning she tried to use her asthma inhaler but received no relief. EKG completed. Call light in reach.

## 2024-05-26 ENCOUNTER — HOSPITAL ENCOUNTER (EMERGENCY)
Age: 74
Discharge: HOME | End: 2024-05-26
Attending: EMERGENCY MEDICINE
Payer: MEDICARE

## 2024-05-26 ENCOUNTER — APPOINTMENT (OUTPATIENT)
Dept: GENERAL RADIOLOGY | Age: 74
End: 2024-05-26
Payer: MEDICARE

## 2024-05-26 VITALS
DIASTOLIC BLOOD PRESSURE: 57 MMHG | TEMPERATURE: 97.8 F | OXYGEN SATURATION: 95 % | SYSTOLIC BLOOD PRESSURE: 129 MMHG | HEART RATE: 67 BPM | WEIGHT: 115 LBS | BODY MASS INDEX: 21.03 KG/M2 | RESPIRATION RATE: 18 BRPM

## 2024-05-26 DIAGNOSIS — U07.1 COVID-19 VIRUS INFECTION: Primary | ICD-10-CM

## 2024-05-26 LAB
ANION GAP SERPL CALC-SCNC: 12 MEQ/L (ref 8–16)
BACTERIA URNS QL MICRO: ABNORMAL /HPF
BASOPHILS ABSOLUTE: 0 THOU/MM3 (ref 0–0.1)
BASOPHILS NFR BLD AUTO: 0.4 %
BILIRUB UR QL STRIP.AUTO: NEGATIVE
BUN SERPL-MCNC: 17 MG/DL (ref 7–22)
CALCIUM SERPL-MCNC: 9.1 MG/DL (ref 8.5–10.5)
CASTS #/AREA URNS LPF: ABNORMAL /LPF
CASTS 2: ABNORMAL /LPF
CHARACTER UR: CLEAR
CHLORIDE SERPL-SCNC: 105 MEQ/L (ref 98–111)
CK SERPL-CCNC: 38 U/L (ref 30–135)
CO2 SERPL-SCNC: 22 MEQ/L (ref 23–33)
COLOR: YELLOW
CREAT SERPL-MCNC: 0.8 MG/DL (ref 0.4–1.2)
CRYSTALS URNS MICRO: ABNORMAL
DEPRECATED RDW RBC AUTO: 48.6 FL (ref 35–45)
EOSINOPHIL NFR BLD AUTO: 0.8 %
EOSINOPHILS ABSOLUTE: 0.1 THOU/MM3 (ref 0–0.4)
EPITHELIAL CELLS, UA: ABNORMAL /HPF
ERYTHROCYTE [DISTWIDTH] IN BLOOD BY AUTOMATED COUNT: 13.4 % (ref 11.5–14.5)
FLUAV RNA RESP QL NAA+PROBE: NOT DETECTED
FLUBV RNA RESP QL NAA+PROBE: NOT DETECTED
GFR SERPL CREATININE-BSD FRML MDRD: 76 ML/MIN/1.73M2
GLUCOSE SERPL-MCNC: 98 MG/DL (ref 70–108)
GLUCOSE UR QL STRIP.AUTO: NEGATIVE MG/DL
HCT VFR BLD AUTO: 35.7 % (ref 37–47)
HGB BLD-MCNC: 12.3 GM/DL (ref 12–16)
HGB UR QL STRIP.AUTO: NEGATIVE
IMM GRANULOCYTES # BLD AUTO: 0.06 THOU/MM3 (ref 0–0.07)
IMM GRANULOCYTES NFR BLD AUTO: 0.5 %
KETONES UR QL STRIP.AUTO: 15
LYMPHOCYTES ABSOLUTE: 1 THOU/MM3 (ref 1–4.8)
LYMPHOCYTES NFR BLD AUTO: 9 %
MAGNESIUM SERPL-MCNC: 1.4 MG/DL (ref 1.6–2.4)
MCH RBC QN AUTO: 33.5 PG (ref 26–33)
MCHC RBC AUTO-ENTMCNC: 34.5 GM/DL (ref 32.2–35.5)
MCV RBC AUTO: 97.3 FL (ref 81–99)
MISCELLANEOUS 2: ABNORMAL
MONOCYTES ABSOLUTE: 1.4 THOU/MM3 (ref 0.4–1.3)
MONOCYTES NFR BLD AUTO: 12 %
NEUTROPHILS ABSOLUTE: 8.9 THOU/MM3 (ref 1.8–7.7)
NEUTROPHILS NFR BLD AUTO: 77.3 %
NITRITE UR QL STRIP: NEGATIVE
NRBC BLD AUTO-RTO: 0 /100 WBC
OSMOLALITY SERPL CALC.SUM OF ELEC: 279.1 MOSMOL/KG (ref 275–300)
PH UR STRIP.AUTO: 5.5 [PH] (ref 5–9)
PLATELET # BLD AUTO: 278 THOU/MM3 (ref 130–400)
PMV BLD AUTO: 9.9 FL (ref 9.4–12.4)
POTASSIUM SERPL-SCNC: 2.9 MEQ/L (ref 3.5–5.2)
PROT UR STRIP.AUTO-MCNC: NEGATIVE MG/DL
RBC # BLD AUTO: 3.67 MILL/MM3 (ref 4.2–5.4)
RBC URINE: ABNORMAL /HPF
RENAL EPI CELLS #/AREA URNS HPF: ABNORMAL /[HPF]
SARS-COV-2 RNA RESP QL NAA+PROBE: DETECTED
SODIUM SERPL-SCNC: 139 MEQ/L (ref 135–145)
SP GR UR REFRACT.AUTO: 1.02 (ref 1–1.03)
TROPONIN, HIGH SENSITIVITY: 8 NG/L (ref 0–12)
UROBILINOGEN, URINE: 1 EU/DL (ref 0–1)
WBC # BLD AUTO: 11.5 THOU/MM3 (ref 4.8–10.8)
WBC #/AREA URNS HPF: ABNORMAL /HPF
WBC #/AREA URNS HPF: ABNORMAL /[HPF]
YEAST LIKE FUNGI URNS QL MICRO: ABNORMAL

## 2024-05-26 PROCEDURE — 93005 ELECTROCARDIOGRAM TRACING: CPT | Performed by: EMERGENCY MEDICINE

## 2024-05-26 PROCEDURE — 87636 SARSCOV2 & INF A&B AMP PRB: CPT

## 2024-05-26 PROCEDURE — 85025 COMPLETE CBC W/AUTO DIFF WBC: CPT

## 2024-05-26 PROCEDURE — 82550 ASSAY OF CK (CPK): CPT

## 2024-05-26 PROCEDURE — 81001 URINALYSIS AUTO W/SCOPE: CPT

## 2024-05-26 PROCEDURE — 83735 ASSAY OF MAGNESIUM: CPT

## 2024-05-26 PROCEDURE — 81003 URINALYSIS AUTO W/O SCOPE: CPT

## 2024-05-26 PROCEDURE — 84484 ASSAY OF TROPONIN QUANT: CPT

## 2024-05-26 PROCEDURE — 36415 COLL VENOUS BLD VENIPUNCTURE: CPT

## 2024-05-26 PROCEDURE — 6370000000 HC RX 637 (ALT 250 FOR IP): Performed by: EMERGENCY MEDICINE

## 2024-05-26 PROCEDURE — 71046 X-RAY EXAM CHEST 2 VIEWS: CPT

## 2024-05-26 PROCEDURE — 99285 EMERGENCY DEPT VISIT HI MDM: CPT

## 2024-05-26 PROCEDURE — 87086 URINE CULTURE/COLONY COUNT: CPT

## 2024-05-26 PROCEDURE — 80048 BASIC METABOLIC PNL TOTAL CA: CPT

## 2024-05-26 RX ORDER — LANOLIN ALCOHOL/MO/W.PET/CERES
400 CREAM (GRAM) TOPICAL ONCE
Status: COMPLETED | OUTPATIENT
Start: 2024-05-26 | End: 2024-05-26

## 2024-05-26 RX ADMIN — POTASSIUM BICARBONATE 40 MEQ: 782 TABLET, EFFERVESCENT ORAL at 22:58

## 2024-05-26 RX ADMIN — Medication 400 MG: at 22:58

## 2024-05-26 ASSESSMENT — PAIN - FUNCTIONAL ASSESSMENT: PAIN_FUNCTIONAL_ASSESSMENT: 0-10

## 2024-05-26 ASSESSMENT — PAIN SCALES - GENERAL: PAINLEVEL_OUTOF10: 2

## 2024-05-27 LAB
BACTERIA UR CULT: ABNORMAL
EKG ATRIAL RATE: 81 BPM
EKG P AXIS: 85 DEGREES
EKG P-R INTERVAL: 132 MS
EKG Q-T INTERVAL: 370 MS
EKG QRS DURATION: 88 MS
EKG QTC CALCULATION (BAZETT): 429 MS
EKG R AXIS: 89 DEGREES
EKG T AXIS: 87 DEGREES
EKG VENTRICULAR RATE: 81 BPM
ORGANISM: ABNORMAL

## 2024-05-27 PROCEDURE — 93010 ELECTROCARDIOGRAM REPORT: CPT | Performed by: INTERNAL MEDICINE

## 2024-05-27 NOTE — ED TRIAGE NOTES
Pt arrives to ED from home with c/o fever, chills, and fatigue  Pt reports recent travel from [place] earlier today, states for about 3 days she finds herself with intermittent fevers, taking tylenol as prescribed. She states she gets these chills that make her shake and are painful at times  Reports some SOB on excretion   Aox4,VSS. Normally walks without assistive devices. But states she needed wheelchair to get off the plane today

## 2024-05-27 NOTE — ED PROVIDER NOTES
[facility] EMERGENCY DEPT    EMERGENCY MEDICINE      Pt Name: Nicci Mooney  MRN: [id number]  Birthdate 1950  Date of evaluation: 2024  Treating Physician: Dr. Jameson  Resident Physician: George Vazquez MD    CHIEF COMPLAINT       Chief Complaint   Patient presents with    Shortness of Breath    Fever     History obtained from chart review and the patient.    HISTORY OF PRESENT ILLNESS    HPI    Nicci Mooney is a 73 y.o. female with PMH of asthma, GERD, chronic back pain, glaucoma, HTN presents to the emergency department for evaluation of fever for the past 3 days.  Patient also describing chronic cough and shortness of breath since January which she stated she was diagnosed with postviral illness cough.  She stated that she recently traveled to [place] and returned today.  Stated that her temperature was measured at 101 last night.  She described having severe shaking when she gets cold.  She stated that shaking is so severe that she cannot walk.  Patient is denying any chest pain, nausea, vomiting, bowel changes.    The patient has no other acute complaints at this time.    PAST MEDICAL AND SURGICAL HISTORY     Past Medical History:   Diagnosis Date    Asthma     Chronic back pain     Chronic kidney disease     cysts on her lt kidney    Colitis     GERD (gastroesophageal reflux disease)     Dr. Bety Rich    Glaucoma         Hearing loss within past 2 years    untreated    Hyperlipidemia     Hypertension        Past Surgical History:   Procedure Laterality Date    BREAST SURGERY      needle biopsies     SECTION      x2    EYE SURGERY  18 and 19    SLT laser    HYSTERECTOMY (CERVIX STATUS UNKNOWN)      HYSTERECTOMY, TOTAL ABDOMINAL (CERVIX REMOVED)      SEPTOPLASTY  2016    TONSILLECTOMY      TURBINATE RESECTION  2016    UPPER GASTROINTESTINAL ENDOSCOPY  21    Dr Bety Rich       CURRENT MEDICATIONS     Discharge Medication List as of 2024

## 2024-05-29 ENCOUNTER — TELEMEDICINE (OUTPATIENT)
Dept: FAMILY MEDICINE CLINIC | Age: 74
End: 2024-05-29

## 2024-05-29 DIAGNOSIS — U07.1 COVID-19: Primary | ICD-10-CM

## 2024-05-29 RX ORDER — BROMPHENIRAMINE MALEATE, PSEUDOEPHEDRINE HYDROCHLORIDE, AND DEXTROMETHORPHAN HYDROBROMIDE 2; 30; 10 MG/5ML; MG/5ML; MG/5ML
5 SYRUP ORAL 4 TIMES DAILY PRN
Qty: 120 ML | Refills: 0 | Status: SHIPPED | OUTPATIENT
Start: 2024-05-29

## 2024-05-29 ASSESSMENT — ENCOUNTER SYMPTOMS
VOMITING: 0
RHINORRHEA: 0
EYE DISCHARGE: 0
ABDOMINAL PAIN: 0
CONSTIPATION: 0
SHORTNESS OF BREATH: 0
SINUS PAIN: 1
CHEST TIGHTNESS: 0
SINUS PRESSURE: 1
DIARRHEA: 0
SORE THROAT: 1
COUGH: 1

## 2024-05-29 NOTE — PROGRESS NOTES
Nicci Mooney, was evaluated through a synchronous (real-time) audio-video encounter. The patient (or guardian if applicable) is aware that this is a billable service, which includes applicable co-pays. This Virtual Visit was conducted with patient's (and/or legal guardian's) consent. Patient identification was verified, and a caregiver was present when appropriate.   The patient was located at Home: [address]  [address]  [facility] [address]  Provider was located at Facility (Appt Dept): [address] [place] [address].  [place] [address]  Confirm you are appropriately licensed, registered, or certified to deliver care in the state where the patient is located as indicated above. If you are not or unsure, please re-schedule the visit: Yes, I confirm.     Nicci Mooney (:  1950) is a Established patient, presenting virtually for evaluation of the following:    Assessment & Plan   Below is the assessment and plan developed based on review of pertinent history, physical exam, labs, studies, and medications.  1. COVID-19  -     brompheniramine-pseudoephedrine-DM 2-30-10 MG/5ML syrup; Take 5 mLs by mouth 4 times daily as needed for Cough or Congestion, Disp-120 mL, R-0Normal  Can do imodium and probiotic OTC to help with the diarrhea   Reviewed urine culture from recent hospital visit and was not indicative of UTI  Continue sinus rinses  Continue vitamin d, vitamin c  Add vitamin b12 and zinc  Physical exam is limited with a virtual visit if symptoms worsen or persist will need to follow-up however symptoms are improving   Came to continue Tessalon Perles and will add Bromfed-DM it   Subjective     May 16th- was seen at ER in the middle of the night- did have covid vaccines and was told that she was not contagious.   Went to [place] and had fever sweats and chills when she arrived    Was seen again in ER on  when she got back in [facility] and diagnosed with covid 19 and given mag and K supplement  Did have WBC in urine

## 2024-06-18 ENCOUNTER — OFFICE VISIT (OUTPATIENT)
Dept: FAMILY MEDICINE CLINIC | Age: 74
End: 2024-06-18

## 2024-06-18 VITALS
HEART RATE: 69 BPM | OXYGEN SATURATION: 97 % | TEMPERATURE: 97.2 F | SYSTOLIC BLOOD PRESSURE: 112 MMHG | WEIGHT: 114.2 LBS | DIASTOLIC BLOOD PRESSURE: 64 MMHG | RESPIRATION RATE: 16 BRPM | BODY MASS INDEX: 20.89 KG/M2

## 2024-06-18 DIAGNOSIS — U09.9 POST-COVID CHRONIC COUGH: Primary | ICD-10-CM

## 2024-06-18 DIAGNOSIS — I10 ESSENTIAL HYPERTENSION: ICD-10-CM

## 2024-06-18 DIAGNOSIS — J45.30 MILD PERSISTENT ASTHMA WITHOUT COMPLICATION: ICD-10-CM

## 2024-06-18 DIAGNOSIS — R05.3 POST-COVID CHRONIC COUGH: Primary | ICD-10-CM

## 2024-06-18 DIAGNOSIS — E78.2 MIXED HYPERLIPIDEMIA: ICD-10-CM

## 2024-06-18 DIAGNOSIS — J30.2 SEASONAL ALLERGIES: ICD-10-CM

## 2024-06-18 RX ORDER — FLUTICASONE PROPIONATE 50 MCG
2 SPRAY, SUSPENSION (ML) NASAL DAILY
Qty: 16 G | Refills: 5 | Status: SHIPPED | OUTPATIENT
Start: 2024-06-18

## 2024-06-18 RX ORDER — FLUTICASONE FUROATE, UMECLIDINIUM BROMIDE AND VILANTEROL TRIFENATATE 100; 62.5; 25 UG/1; UG/1; UG/1
1 POWDER RESPIRATORY (INHALATION) DAILY
Qty: 1 EACH | Refills: 1 | Status: SHIPPED | OUTPATIENT
Start: 2024-06-18

## 2024-06-18 ASSESSMENT — PATIENT HEALTH QUESTIONNAIRE - PHQ9
SUM OF ALL RESPONSES TO PHQ QUESTIONS 1-9: 0
1. LITTLE INTEREST OR PLEASURE IN DOING THINGS: NOT AT ALL
SUM OF ALL RESPONSES TO PHQ9 QUESTIONS 1 & 2: 0
2. FEELING DOWN, DEPRESSED OR HOPELESS: NOT AT ALL
SUM OF ALL RESPONSES TO PHQ QUESTIONS 1-9: 0

## 2024-06-18 NOTE — PROGRESS NOTES
Health Maintenance Due   Topic Date Due    DTaP/Tdap/Td vaccine (1 - Tdap) Never done    Shingles vaccine (2 of 2) 03/04/2020       
       Objective       /64   Pulse 69   Temp 97.2 °F (36.2 °C) (Temporal)   Resp 16   Wt 51.8 kg (114 lb 3.2 oz)   SpO2 97%   BMI 20.89 kg/m²   Physical Exam  Vitals reviewed.   Constitutional:       Appearance: She is well-developed.   HENT:      Head: Normocephalic and atraumatic.      Right Ear: External ear normal.      Left Ear: External ear normal.   Eyes:      Conjunctiva/sclera: Conjunctivae normal.   Neck:      Thyroid: No thyromegaly.      Trachea: Trachea normal.   Cardiovascular:      Rate and Rhythm: Normal rate and regular rhythm.      Heart sounds: Normal heart sounds. No murmur heard.     No friction rub. No gallop.   Pulmonary:      Effort: Pulmonary effort is normal.      Breath sounds: Wheezing present.   Abdominal:      General: Bowel sounds are normal.      Palpations: Abdomen is soft.      Tenderness: There is no abdominal tenderness.   Musculoskeletal:         General: Normal range of motion.      Cervical back: Normal range of motion and neck supple. No spinous process tenderness.   Skin:     General: Skin is warm and dry.      Findings: No erythema or rash.   Neurological:      Mental Status: She is alert and oriented to person, place, and time.   Psychiatric:         Speech: Speech normal.         Behavior: Behavior normal.         Thought Content: Thought content normal.       Data Reviewed and Summarized       Labs:     Imaging/Testing:        SONYA COLE - LINN

## 2024-06-19 ASSESSMENT — ENCOUNTER SYMPTOMS
RHINORRHEA: 0
CONSTIPATION: 0
EYE DISCHARGE: 0
COUGH: 1
DIARRHEA: 0
CHEST TIGHTNESS: 0
VOMITING: 0
SHORTNESS OF BREATH: 0
ABDOMINAL PAIN: 0

## 2024-06-24 ENCOUNTER — HOSPITAL ENCOUNTER (OUTPATIENT)
Dept: PULMONOLOGY | Age: 74
Discharge: HOME | End: 2024-06-24
Payer: MEDICARE

## 2024-06-24 DIAGNOSIS — R05.3 POST-COVID CHRONIC COUGH: ICD-10-CM

## 2024-06-24 DIAGNOSIS — U09.9 POST-COVID CHRONIC COUGH: ICD-10-CM

## 2024-06-24 PROCEDURE — 94060 EVALUATION OF WHEEZING: CPT

## 2024-07-10 NOTE — PROGRESS NOTES
[facility]    Patient: ELENA YANCEY, 73 y.o.   : 1950    Patient of Kristie Celaya APRN - CNP   Referring Provider: Kristie Celaya APRN - *       Subjective   No chief complaint on file.       HPI      Very pleasant 73-year-old lady with long history of environmental allergies and asthma, was well until 2024 when she acquired a viral syndrome and self her voice change and productive cough, the cough persisted but 2 weeks after starting it became nonproductive.  Episode of COVID-19 last week of May where she had chills and fever, chest tightness, coughing  More recently started on Trelegy by Kristie Celaya CNP with benefit including resolution of cough, voice changes and chest tightness.    Spirometry complete 2024 reveals reversible airway obstruction, air trapping and increased diffusion capacity consistent with asthma.    Long history of multiple environmental allergies, allergy testing as a child reveal allergy to at least 73 antigens, was given allergy shots.    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      Very active walks daily usually faster than her friends that have difficult time keeping up with her, denies nocturnal symptoms, denies chest tightness, wheezing, shortness of breath    Have not required her rescue inhaler in a while      Immunizations:  Immunization History   Administered Date(s) Administered    COVID-19, MODERNA BLUE border, Primary or Immunocompromised, (age 12y+), IM, 100 mcg/0.5mL 2021, 2021, 09/15/2021, 2022    COVID-19, MODERNA Booster BLUE border, (age 18y+), IM, 50mcg/0.25mL 09/15/2021    COVID-19, MODERNA, ( formula), (age 12y+), IM,

## 2024-07-11 ENCOUNTER — OFFICE VISIT (OUTPATIENT)
Dept: PULMONOLOGY | Age: 74
End: 2024-07-11
Payer: MEDICARE

## 2024-07-11 VITALS
WEIGHT: 117.4 LBS | HEART RATE: 67 BPM | DIASTOLIC BLOOD PRESSURE: 80 MMHG | HEIGHT: 62 IN | SYSTOLIC BLOOD PRESSURE: 140 MMHG | TEMPERATURE: 98.2 F | OXYGEN SATURATION: 99 % | BODY MASS INDEX: 21.6 KG/M2

## 2024-07-11 DIAGNOSIS — J45.21 MILD INTERMITTENT ASTHMATIC BRONCHITIS WITH ACUTE EXACERBATION: ICD-10-CM

## 2024-07-11 DIAGNOSIS — J30.81 ALLERGIC RHINITIS DUE TO ANIMAL HAIR AND DANDER: ICD-10-CM

## 2024-07-11 DIAGNOSIS — J45.30 MILD PERSISTENT ASTHMA WITHOUT COMPLICATION: Primary | ICD-10-CM

## 2024-07-11 PROCEDURE — G8399 PT W/DXA RESULTS DOCUMENT: HCPCS | Performed by: INTERNAL MEDICINE

## 2024-07-11 PROCEDURE — 1090F PRES/ABSN URINE INCON ASSESS: CPT | Performed by: INTERNAL MEDICINE

## 2024-07-11 PROCEDURE — 3079F DIAST BP 80-89 MM HG: CPT | Performed by: INTERNAL MEDICINE

## 2024-07-11 PROCEDURE — G8420 CALC BMI NORM PARAMETERS: HCPCS | Performed by: INTERNAL MEDICINE

## 2024-07-11 PROCEDURE — 3077F SYST BP >= 140 MM HG: CPT | Performed by: INTERNAL MEDICINE

## 2024-07-11 PROCEDURE — G8427 DOCREV CUR MEDS BY ELIG CLIN: HCPCS | Performed by: INTERNAL MEDICINE

## 2024-07-11 PROCEDURE — 1036F TOBACCO NON-USER: CPT | Performed by: INTERNAL MEDICINE

## 2024-07-11 PROCEDURE — 99204 OFFICE O/P NEW MOD 45 MIN: CPT | Performed by: INTERNAL MEDICINE

## 2024-07-11 PROCEDURE — 3017F COLORECTAL CA SCREEN DOC REV: CPT | Performed by: INTERNAL MEDICINE

## 2024-07-11 PROCEDURE — 1123F ACP DISCUSS/DSCN MKR DOCD: CPT | Performed by: INTERNAL MEDICINE

## 2024-07-11 RX ORDER — ALBUTEROL SULFATE 90 UG/1
2 AEROSOL, METERED RESPIRATORY (INHALATION) 4 TIMES DAILY PRN
Qty: 18 G | Refills: 2 | Status: SHIPPED | OUTPATIENT
Start: 2024-07-11

## 2024-07-15 DIAGNOSIS — I10 ESSENTIAL HYPERTENSION: ICD-10-CM

## 2024-07-15 RX ORDER — LISINOPRIL AND HYDROCHLOROTHIAZIDE 20; 12.5 MG/1; MG/1
1 TABLET ORAL DAILY
Qty: 90 TABLET | Refills: 3 | Status: SHIPPED | OUTPATIENT
Start: 2024-07-15

## 2024-09-10 SDOH — HEALTH STABILITY: PHYSICAL HEALTH: ON AVERAGE, HOW MANY DAYS PER WEEK DO YOU ENGAGE IN MODERATE TO STRENUOUS EXERCISE (LIKE A BRISK WALK)?: 5 DAYS

## 2024-09-10 SDOH — HEALTH STABILITY: PHYSICAL HEALTH: ON AVERAGE, HOW MANY MINUTES DO YOU ENGAGE IN EXERCISE AT THIS LEVEL?: 30 MIN

## 2024-09-10 ASSESSMENT — LIFESTYLE VARIABLES
HOW OFTEN DO YOU HAVE SIX OR MORE DRINKS ON ONE OCCASION: 1
HAVE YOU OR SOMEONE ELSE BEEN INJURED AS A RESULT OF YOUR DRINKING: NO
HOW MANY STANDARD DRINKS CONTAINING ALCOHOL DO YOU HAVE ON A TYPICAL DAY: 1
HOW MANY STANDARD DRINKS CONTAINING ALCOHOL DO YOU HAVE ON A TYPICAL DAY: 1 OR 2
HOW OFTEN DURING THE LAST YEAR HAVE YOU HAD A FEELING OF GUILT OR REMORSE AFTER DRINKING: NEVER
HOW OFTEN DURING THE LAST YEAR HAVE YOU FOUND THAT YOU WERE NOT ABLE TO STOP DRINKING ONCE YOU HAD STARTED: NEVER
HOW OFTEN DO YOU HAVE A DRINK CONTAINING ALCOHOL: 5
HOW OFTEN DURING THE LAST YEAR HAVE YOU BEEN UNABLE TO REMEMBER WHAT HAPPENED THE NIGHT BEFORE BECAUSE YOU HAD BEEN DRINKING: NEVER
HOW OFTEN DURING THE LAST YEAR HAVE YOU FAILED TO DO WHAT WAS NORMALLY EXPECTED FROM YOU BECAUSE OF DRINKING: NEVER
HOW OFTEN DO YOU HAVE A DRINK CONTAINING ALCOHOL: 4 OR MORE TIMES A WEEK
HOW OFTEN DURING THE LAST YEAR HAVE YOU FOUND THAT YOU WERE NOT ABLE TO STOP DRINKING ONCE YOU HAD STARTED: NEVER
HOW OFTEN DURING THE LAST YEAR HAVE YOU NEEDED AN ALCOHOLIC DRINK FIRST THING IN THE MORNING TO GET YOURSELF GOING AFTER A NIGHT OF HEAVY DRINKING: NEVER
HOW OFTEN DURING THE LAST YEAR HAVE YOU NEEDED AN ALCOHOLIC DRINK FIRST THING IN THE MORNING TO GET YOURSELF GOING AFTER A NIGHT OF HEAVY DRINKING: NEVER
HAS A RELATIVE, FRIEND, DOCTOR, OR ANOTHER HEALTH PROFESSIONAL EXPRESSED CONCERN ABOUT YOUR DRINKING OR SUGGESTED YOU CUT DOWN: NO
HOW OFTEN DURING THE LAST YEAR HAVE YOU BEEN UNABLE TO REMEMBER WHAT HAPPENED THE NIGHT BEFORE BECAUSE YOU HAD BEEN DRINKING: NEVER
HAVE YOU OR SOMEONE ELSE BEEN INJURED AS A RESULT OF YOUR DRINKING: NO
HOW OFTEN DURING THE LAST YEAR HAVE YOU FAILED TO DO WHAT WAS NORMALLY EXPECTED FROM YOU BECAUSE OF DRINKING: NEVER
HAS A RELATIVE, FRIEND, DOCTOR, OR ANOTHER HEALTH PROFESSIONAL EXPRESSED CONCERN ABOUT YOUR DRINKING OR SUGGESTED YOU CUT DOWN: NO
HOW OFTEN DURING THE LAST YEAR HAVE YOU HAD A FEELING OF GUILT OR REMORSE AFTER DRINKING: NEVER

## 2024-09-10 ASSESSMENT — PATIENT HEALTH QUESTIONNAIRE - PHQ9
SUM OF ALL RESPONSES TO PHQ QUESTIONS 1-9: 0
1. LITTLE INTEREST OR PLEASURE IN DOING THINGS: NOT AT ALL
SUM OF ALL RESPONSES TO PHQ9 QUESTIONS 1 & 2: 0
SUM OF ALL RESPONSES TO PHQ QUESTIONS 1-9: 0
2. FEELING DOWN, DEPRESSED OR HOPELESS: NOT AT ALL

## 2024-09-23 ENCOUNTER — PATIENT MESSAGE (OUTPATIENT)
Dept: FAMILY MEDICINE CLINIC | Age: 74
End: 2024-09-23

## 2024-09-23 ENCOUNTER — OFFICE VISIT (OUTPATIENT)
Dept: FAMILY MEDICINE CLINIC | Age: 74
End: 2024-09-23
Payer: MEDICARE

## 2024-09-23 VITALS
WEIGHT: 115 LBS | OXYGEN SATURATION: 96 % | DIASTOLIC BLOOD PRESSURE: 76 MMHG | HEART RATE: 78 BPM | HEIGHT: 62 IN | SYSTOLIC BLOOD PRESSURE: 147 MMHG | BODY MASS INDEX: 21.16 KG/M2 | TEMPERATURE: 96.8 F

## 2024-09-23 DIAGNOSIS — N30.90 CYSTITIS: Primary | ICD-10-CM

## 2024-09-23 DIAGNOSIS — G25.0 ESSENTIAL TREMOR: ICD-10-CM

## 2024-09-23 DIAGNOSIS — R82.90 CLOUDY URINE: ICD-10-CM

## 2024-09-23 DIAGNOSIS — Z00.00 MEDICARE ANNUAL WELLNESS VISIT, SUBSEQUENT: Primary | ICD-10-CM

## 2024-09-23 DIAGNOSIS — I10 ESSENTIAL HYPERTENSION: ICD-10-CM

## 2024-09-23 DIAGNOSIS — E78.2 MIXED HYPERLIPIDEMIA: ICD-10-CM

## 2024-09-23 LAB
BILIRUBIN, POC: NEGATIVE
BLOOD URINE, POC: ABNORMAL
CLARITY, POC: CLEAR
COLOR, POC: ABNORMAL
GLUCOSE URINE, POC: NEGATIVE MG/DL
KETONES, POC: ABNORMAL MG/DL
LEUKOCYTE EST, POC: ABNORMAL
NITRITE, POC: NEGATIVE
PH, POC: 5.5
PROTEIN, POC: NEGATIVE MG/DL
SPECIFIC GRAVITY, POC: 1.02
UROBILINOGEN, POC: ABNORMAL MG/DL

## 2024-09-23 PROCEDURE — G8420 CALC BMI NORM PARAMETERS: HCPCS | Performed by: NURSE PRACTITIONER

## 2024-09-23 PROCEDURE — G8427 DOCREV CUR MEDS BY ELIG CLIN: HCPCS | Performed by: NURSE PRACTITIONER

## 2024-09-23 PROCEDURE — G8399 PT W/DXA RESULTS DOCUMENT: HCPCS | Performed by: NURSE PRACTITIONER

## 2024-09-23 PROCEDURE — 1090F PRES/ABSN URINE INCON ASSESS: CPT | Performed by: NURSE PRACTITIONER

## 2024-09-23 PROCEDURE — 1123F ACP DISCUSS/DSCN MKR DOCD: CPT | Performed by: NURSE PRACTITIONER

## 2024-09-23 PROCEDURE — 1036F TOBACCO NON-USER: CPT | Performed by: NURSE PRACTITIONER

## 2024-09-23 PROCEDURE — G0439 PPPS, SUBSEQ VISIT: HCPCS | Performed by: NURSE PRACTITIONER

## 2024-09-23 PROCEDURE — 3077F SYST BP >= 140 MM HG: CPT | Performed by: NURSE PRACTITIONER

## 2024-09-23 PROCEDURE — 3078F DIAST BP <80 MM HG: CPT | Performed by: NURSE PRACTITIONER

## 2024-09-23 PROCEDURE — 81003 URINALYSIS AUTO W/O SCOPE: CPT | Performed by: NURSE PRACTITIONER

## 2024-09-23 PROCEDURE — 99214 OFFICE O/P EST MOD 30 MIN: CPT | Performed by: NURSE PRACTITIONER

## 2024-09-23 RX ORDER — METOPROLOL SUCCINATE 25 MG/1
25 TABLET, EXTENDED RELEASE ORAL DAILY
Qty: 30 TABLET | Refills: 3 | Status: SHIPPED | OUTPATIENT
Start: 2024-09-23 | End: 2024-09-23

## 2024-09-23 RX ORDER — METOPROLOL SUCCINATE 25 MG/1
25 TABLET, EXTENDED RELEASE ORAL DAILY
Qty: 30 TABLET | Refills: 3 | Status: SHIPPED | OUTPATIENT
Start: 2024-09-23

## 2024-09-23 RX ORDER — CIPROFLOXACIN 500 MG/1
500 TABLET, FILM COATED ORAL 2 TIMES DAILY
Qty: 14 TABLET | Refills: 0 | Status: SHIPPED | OUTPATIENT
Start: 2024-09-23 | End: 2024-09-30

## 2024-09-24 LAB
BACTERIA UR CULT: ABNORMAL
ORGANISM: ABNORMAL

## 2024-10-14 ENCOUNTER — LAB (OUTPATIENT)
Dept: FAMILY MEDICINE CLINIC | Age: 74
End: 2024-10-14
Payer: MEDICARE

## 2024-10-14 ENCOUNTER — HOSPITAL ENCOUNTER (OUTPATIENT)
Dept: PULMONOLOGY | Age: 74
Discharge: HOME | End: 2024-10-14
Attending: INTERNAL MEDICINE
Payer: MEDICARE

## 2024-10-14 DIAGNOSIS — I10 ESSENTIAL HYPERTENSION, MALIGNANT: ICD-10-CM

## 2024-10-14 DIAGNOSIS — E78.2 MIXED HYPERLIPIDEMIA: ICD-10-CM

## 2024-10-14 DIAGNOSIS — G25.0 BENIGN ESSENTIAL TREMOR: Primary | ICD-10-CM

## 2024-10-14 DIAGNOSIS — J30.81 ALLERGIC RHINITIS DUE TO ANIMAL HAIR AND DANDER: ICD-10-CM

## 2024-10-14 DIAGNOSIS — I10 ESSENTIAL HYPERTENSION: ICD-10-CM

## 2024-10-14 DIAGNOSIS — J45.30 MILD PERSISTENT ASTHMA WITHOUT COMPLICATION: ICD-10-CM

## 2024-10-14 DIAGNOSIS — J45.21 MILD INTERMITTENT ASTHMATIC BRONCHITIS WITH ACUTE EXACERBATION: ICD-10-CM

## 2024-10-14 DIAGNOSIS — G25.0 ESSENTIAL TREMOR: ICD-10-CM

## 2024-10-14 LAB
ALBUMIN SERPL BCG-MCNC: 4.3 G/DL (ref 3.5–5.1)
ALP SERPL-CCNC: 57 U/L (ref 38–126)
ALT SERPL W/O P-5'-P-CCNC: 22 U/L (ref 11–66)
ANION GAP SERPL CALC-SCNC: 13 MEQ/L (ref 8–16)
AST SERPL-CCNC: 20 U/L (ref 5–40)
BASOPHILS ABSOLUTE: 0 THOU/MM3 (ref 0–0.1)
BASOPHILS NFR BLD AUTO: 0.8 %
BILIRUB SERPL-MCNC: 0.8 MG/DL (ref 0.3–1.2)
BUN SERPL-MCNC: 16 MG/DL (ref 7–22)
CALCIUM SERPL-MCNC: 10.1 MG/DL (ref 8.5–10.5)
CHLORIDE SERPL-SCNC: 106 MEQ/L (ref 98–111)
CHOLEST SERPL-MCNC: 200 MG/DL (ref 100–199)
CO2 SERPL-SCNC: 26 MEQ/L (ref 23–33)
CREAT SERPL-MCNC: 0.7 MG/DL (ref 0.4–1.2)
DEPRECATED RDW RBC AUTO: 47.2 FL (ref 35–45)
EOSINOPHIL NFR BLD AUTO: 2.7 %
EOSINOPHILS ABSOLUTE: 0.2 THOU/MM3 (ref 0–0.4)
ERYTHROCYTE [DISTWIDTH] IN BLOOD BY AUTOMATED COUNT: 13.2 % (ref 11.5–14.5)
GFR SERPL CREATININE-BSD FRML MDRD: 89 ML/MIN/1.73M2
GLUCOSE FASTING: 96 MG/DL (ref 70–108)
HCT VFR BLD AUTO: 42.9 % (ref 37–47)
HDLC SERPL-MCNC: 98 MG/DL
HGB BLD-MCNC: 14.7 GM/DL (ref 12–16)
IMM GRANULOCYTES # BLD AUTO: 0.02 THOU/MM3 (ref 0–0.07)
IMM GRANULOCYTES NFR BLD AUTO: 0.3 %
LDLC SERPL CALC-MCNC: 90 MG/DL
LYMPHOCYTES ABSOLUTE: 1.9 THOU/MM3 (ref 1–4.8)
LYMPHOCYTES NFR BLD AUTO: 30.9 %
MAGNESIUM SERPL-MCNC: 1.9 MG/DL (ref 1.6–2.4)
MCH RBC QN AUTO: 33.6 PG (ref 26–33)
MCHC RBC AUTO-ENTMCNC: 34.3 GM/DL (ref 32.2–35.5)
MCV RBC AUTO: 98.2 FL (ref 81–99)
MONOCYTES ABSOLUTE: 0.6 THOU/MM3 (ref 0.4–1.3)
MONOCYTES NFR BLD AUTO: 9.8 %
NEUTROPHILS ABSOLUTE: 3.3 THOU/MM3 (ref 1.8–7.7)
NEUTROPHILS NFR BLD AUTO: 55.5 %
NRBC BLD AUTO-RTO: 0 /100 WBC
PLATELET # BLD AUTO: 289 THOU/MM3 (ref 130–400)
PMV BLD AUTO: 11.7 FL (ref 9.4–12.4)
POTASSIUM SERPL-SCNC: 4 MEQ/L (ref 3.5–5.2)
PROT SERPL-MCNC: 7.1 G/DL (ref 6.1–8)
RBC # BLD AUTO: 4.37 MILL/MM3 (ref 4.2–5.4)
SODIUM SERPL-SCNC: 145 MEQ/L (ref 135–145)
TRIGL SERPL-MCNC: 58 MG/DL (ref 0–199)
TSH SERPL DL<=0.005 MIU/L-ACNC: 1.18 UIU/ML (ref 0.4–4.2)
WBC # BLD AUTO: 6 THOU/MM3 (ref 4.8–10.8)

## 2024-10-14 PROCEDURE — 94060 EVALUATION OF WHEEZING: CPT

## 2024-10-14 PROCEDURE — 36415 COLL VENOUS BLD VENIPUNCTURE: CPT | Performed by: NURSE PRACTITIONER

## 2024-10-22 ENCOUNTER — OFFICE VISIT (OUTPATIENT)
Dept: FAMILY MEDICINE CLINIC | Age: 74
End: 2024-10-22

## 2024-10-22 VITALS
SYSTOLIC BLOOD PRESSURE: 150 MMHG | HEART RATE: 64 BPM | DIASTOLIC BLOOD PRESSURE: 78 MMHG | WEIGHT: 115.8 LBS | OXYGEN SATURATION: 97 % | TEMPERATURE: 97.5 F | HEIGHT: 62 IN | BODY MASS INDEX: 21.31 KG/M2 | RESPIRATION RATE: 20 BRPM

## 2024-10-22 DIAGNOSIS — I10 PRIMARY HYPERTENSION: Primary | ICD-10-CM

## 2024-10-22 DIAGNOSIS — R42 EPISODE OF DIZZINESS: ICD-10-CM

## 2024-10-22 DIAGNOSIS — R42 DIZZINESS: ICD-10-CM

## 2024-10-22 DIAGNOSIS — G25.0 ESSENTIAL TREMOR: ICD-10-CM

## 2024-10-22 RX ORDER — DORZOLAMIDE HCL 20 MG/ML
1 SOLUTION/ DROPS OPHTHALMIC 3 TIMES DAILY
COMMUNITY

## 2024-10-22 RX ORDER — METOPROLOL SUCCINATE 50 MG/1
50 TABLET, EXTENDED RELEASE ORAL DAILY
Qty: 90 TABLET | Refills: 1 | Status: SHIPPED | OUTPATIENT
Start: 2024-10-22

## 2024-10-22 ASSESSMENT — ENCOUNTER SYMPTOMS
CHEST TIGHTNESS: 0
DIARRHEA: 0
CONSTIPATION: 0
RHINORRHEA: 0
EYE DISCHARGE: 0
SHORTNESS OF BREATH: 0
COUGH: 0
VOMITING: 0
ABDOMINAL PAIN: 0

## 2024-10-22 NOTE — PROGRESS NOTES
Health Maintenance Due   Topic Date Due    DTaP/Tdap/Td vaccine (1 - Tdap) Never done    Flu vaccine (1) 08/01/2024      Patient reports she got Spikevax 10/17/2024 and Shingrix on 10/17/2024    Patient would like to hold off on flu vaccine at this time.   
  Gastrointestinal:  Negative for abdominal pain, constipation, diarrhea and vomiting.   Genitourinary:  Negative for difficulty urinating and hematuria.   Musculoskeletal:  Negative for arthralgias and myalgias.   Skin:  Negative for rash.   Neurological:  Positive for dizziness. Negative for weakness, numbness and headaches.   Psychiatric/Behavioral:  The patient is not nervous/anxious.       Allergies   Allergen Reactions   • Keflex [Cephalexin] Hives   • Theophyllines Nausea And Vomiting       Outpatient Medications Prior to Visit   Medication Sig Dispense Refill   • dorzolamide (TRUSOPT) 2 % ophthalmic solution Place 1 drop into both eyes 3 times daily     • lisinopril-hydroCHLOROthiazide (PRINZIDE;ZESTORETIC) 20-12.5 MG per tablet TAKE 1 TABLET BY MOUTH DAILY 90 tablet 3   • fluticasone (FLONASE) 50 MCG/ACT nasal spray 2 sprays by Each Nostril route daily 16 g 5   • loratadine (CLARITIN) 10 MG tablet Take 1 tablet by mouth daily 90 tablet 3   • montelukast (SINGULAIR) 10 MG tablet TAKE 1 TABLET BY MOUTH DAILY 90 tablet 3   • valACYclovir (VALTREX) 1 g tablet Take 1 tablet by mouth daily 90 tablet 3   • atorvastatin (LIPITOR) 10 MG tablet TAKE 1 TABLET BY MOUTH DAILY 90 tablet 3   • Omega-3 Fatty Acids (FISH OIL) 1000 MG CAPS Take by mouth     • budesonide (ENTOCORT EC) 3 MG extended release capsule      • Tafluprost, PF, (ZIOPTAN) 0.0015 % SOLN Apply 1 drop to eye nightly     • Netarsudil Dimesylate (RHOPRESSA) 0.02 % SOLN Apply 1 drop to eye daily Right eye     • metoprolol succinate (TOPROL XL) 25 MG extended release tablet Take 1 tablet by mouth daily 30 tablet 3   • budesonide (PULMICORT FLEXHALER) 90 MCG/ACT AEPB inhaler Inhale 2 puffs into the lungs in the morning and 2 puffs in the evening. (Patient not taking: Reported on 10/22/2024) 1 each 2   • albuterol sulfate HFA (VENTOLIN HFA) 108 (90 Base) MCG/ACT inhaler Inhale 2 puffs into the lungs 4 times daily as needed for Wheezing (Patient not taking:

## 2024-10-23 ENCOUNTER — OFFICE VISIT (OUTPATIENT)
Dept: PULMONOLOGY | Age: 74
End: 2024-10-23
Payer: MEDICARE

## 2024-10-23 VITALS
SYSTOLIC BLOOD PRESSURE: 116 MMHG | WEIGHT: 117.2 LBS | HEART RATE: 63 BPM | TEMPERATURE: 97.7 F | DIASTOLIC BLOOD PRESSURE: 78 MMHG | HEIGHT: 62 IN | BODY MASS INDEX: 21.57 KG/M2 | OXYGEN SATURATION: 99 %

## 2024-10-23 DIAGNOSIS — U07.1 COVID-19: ICD-10-CM

## 2024-10-23 DIAGNOSIS — J45.20 MILD INTERMITTENT ASTHMA WITHOUT COMPLICATION: ICD-10-CM

## 2024-10-23 DIAGNOSIS — J44.9 CHRONIC OBSTRUCTIVE PULMONARY DISEASE, UNSPECIFIED COPD TYPE (HCC): Primary | ICD-10-CM

## 2024-10-23 PROCEDURE — 3023F SPIROM DOC REV: CPT | Performed by: INTERNAL MEDICINE

## 2024-10-23 PROCEDURE — G8484 FLU IMMUNIZE NO ADMIN: HCPCS | Performed by: INTERNAL MEDICINE

## 2024-10-23 PROCEDURE — G8399 PT W/DXA RESULTS DOCUMENT: HCPCS | Performed by: INTERNAL MEDICINE

## 2024-10-23 PROCEDURE — 1159F MED LIST DOCD IN RCRD: CPT | Performed by: INTERNAL MEDICINE

## 2024-10-23 PROCEDURE — 1123F ACP DISCUSS/DSCN MKR DOCD: CPT | Performed by: INTERNAL MEDICINE

## 2024-10-23 PROCEDURE — 3074F SYST BP LT 130 MM HG: CPT | Performed by: INTERNAL MEDICINE

## 2024-10-23 PROCEDURE — 1090F PRES/ABSN URINE INCON ASSESS: CPT | Performed by: INTERNAL MEDICINE

## 2024-10-23 PROCEDURE — 1036F TOBACCO NON-USER: CPT | Performed by: INTERNAL MEDICINE

## 2024-10-23 PROCEDURE — G8427 DOCREV CUR MEDS BY ELIG CLIN: HCPCS | Performed by: INTERNAL MEDICINE

## 2024-10-23 PROCEDURE — 99213 OFFICE O/P EST LOW 20 MIN: CPT | Performed by: INTERNAL MEDICINE

## 2024-10-23 PROCEDURE — 3078F DIAST BP <80 MM HG: CPT | Performed by: INTERNAL MEDICINE

## 2024-10-23 PROCEDURE — G8420 CALC BMI NORM PARAMETERS: HCPCS | Performed by: INTERNAL MEDICINE

## 2024-10-23 NOTE — PROGRESS NOTES
Neck Circumference -   13  Mallampati - 2    Lung Nodule Screening     [] Qualifies    [x] Does not qualify   [] Declined    [] Completed

## 2024-10-23 NOTE — PROGRESS NOTES
[facility]    Patient: ELENA YANCEY, 74 y.o.   : 1950  10/23/2024    Patient of Kristie Celaya APRN - CNP   Referring Provider: No ref. provider found       Subjective     Chief Complaint   Patient presents with    Follow-up     3 month follow up after sridhar (10.14.24)        HPI    Donavon is doing great not using any inhalers controller or rescue, experiences no chest discomfort, dyspnea, chest tightness, wheezing, cough or sputum production  Comes with spirometry which she is marginally improved from previous  She has a fixed airway obstruction albeit mild with a FEV1 over FVC ratio 57% and a postbronchodilator FEV1 98%.        Previous notes  Very pleasant 73-year-old lady with long history of environmental allergies and asthma, was well until 2024 when she acquired a viral syndrome and self her voice change and productive cough, the cough persisted but 2 weeks after starting it became nonproductive.  Episode of COVID-19 last week of May where she had chills and fever, chest tightness, coughing  More recently started on Trelegy by Kristie Celaya CNP with benefit including resolution of cough, voice changes and chest tightness.    Spirometry complete 2024 reveals reversible airway obstruction, air trapping and increased diffusion capacity consistent with asthma.    Long history of multiple environmental allergies, allergy testing as a child reveal allergy to at least 73 antigens, was given allergy shots.    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      Very active walks daily usually faster than her friends that have difficult time keeping up with her, denies nocturnal symptoms, denies chest tightness, wheezing,

## 2024-11-01 ENCOUNTER — HOSPITAL ENCOUNTER (OUTPATIENT)
Age: 74
Discharge: HOME | End: 2024-11-03
Payer: MEDICARE

## 2024-11-01 DIAGNOSIS — R42 DIZZINESS: ICD-10-CM

## 2024-11-01 DIAGNOSIS — R42 EPISODE OF DIZZINESS: ICD-10-CM

## 2024-11-01 PROCEDURE — 93242 EXT ECG>48HR<7D RECORDING: CPT

## 2025-01-18 DIAGNOSIS — B00.1 RECURRENT HERPES LABIALIS: ICD-10-CM

## 2025-01-18 DIAGNOSIS — E78.2 MIXED HYPERLIPIDEMIA: ICD-10-CM

## 2025-01-18 SDOH — ECONOMIC STABILITY: INCOME INSECURITY: IN THE LAST 12 MONTHS, WAS THERE A TIME WHEN YOU WERE NOT ABLE TO PAY THE MORTGAGE OR RENT ON TIME?: NO

## 2025-01-18 SDOH — ECONOMIC STABILITY: FOOD INSECURITY: WITHIN THE PAST 12 MONTHS, YOU WORRIED THAT YOUR FOOD WOULD RUN OUT BEFORE YOU GOT MONEY TO BUY MORE.: NEVER TRUE

## 2025-01-18 SDOH — ECONOMIC STABILITY: FOOD INSECURITY: WITHIN THE PAST 12 MONTHS, THE FOOD YOU BOUGHT JUST DIDN'T LAST AND YOU DIDN'T HAVE MONEY TO GET MORE.: NEVER TRUE

## 2025-01-20 RX ORDER — MONTELUKAST SODIUM 10 MG/1
10 TABLET ORAL NIGHTLY
Qty: 90 TABLET | Refills: 0 | Status: SHIPPED | OUTPATIENT
Start: 2025-01-20

## 2025-01-20 RX ORDER — ATORVASTATIN CALCIUM 10 MG/1
10 TABLET, FILM COATED ORAL DAILY
Qty: 90 TABLET | Refills: 0 | Status: SHIPPED | OUTPATIENT
Start: 2025-01-20

## 2025-01-20 RX ORDER — VALACYCLOVIR HYDROCHLORIDE 1 G/1
1000 TABLET, FILM COATED ORAL DAILY
Qty: 90 TABLET | Refills: 0 | Status: SHIPPED | OUTPATIENT
Start: 2025-01-20

## 2025-01-21 ENCOUNTER — OFFICE VISIT (OUTPATIENT)
Dept: FAMILY MEDICINE CLINIC | Age: 75
End: 2025-01-21
Payer: MEDICARE

## 2025-01-21 VITALS
HEIGHT: 62 IN | HEART RATE: 65 BPM | TEMPERATURE: 97.1 F | DIASTOLIC BLOOD PRESSURE: 70 MMHG | OXYGEN SATURATION: 98 % | SYSTOLIC BLOOD PRESSURE: 160 MMHG | RESPIRATION RATE: 18 BRPM | BODY MASS INDEX: 22.01 KG/M2 | WEIGHT: 119.6 LBS

## 2025-01-21 DIAGNOSIS — G25.0 ESSENTIAL TREMOR: ICD-10-CM

## 2025-01-21 DIAGNOSIS — M77.02 EPICONDYLITIS ELBOW, MEDIAL, LEFT: ICD-10-CM

## 2025-01-21 DIAGNOSIS — J44.9 CHRONIC OBSTRUCTIVE PULMONARY DISEASE, UNSPECIFIED COPD TYPE (HCC): ICD-10-CM

## 2025-01-21 DIAGNOSIS — E78.2 MIXED HYPERLIPIDEMIA: Primary | ICD-10-CM

## 2025-01-21 DIAGNOSIS — I10 ESSENTIAL HYPERTENSION: ICD-10-CM

## 2025-01-21 DIAGNOSIS — M77.12 EPICONDYLITIS, LATERAL, LEFT: ICD-10-CM

## 2025-01-21 DIAGNOSIS — I10 PRIMARY HYPERTENSION: ICD-10-CM

## 2025-01-21 PROBLEM — F43.21 GRIEVING: Status: RESOLVED | Noted: 2023-08-22 | Resolved: 2025-01-21

## 2025-01-21 PROBLEM — H40.9 GLAUCOMA: Status: RESOLVED | Noted: 2022-06-09 | Resolved: 2025-01-21

## 2025-01-21 PROBLEM — Z87.891 HISTORY OF TOBACCO ABUSE: Status: RESOLVED | Noted: 2022-02-22 | Resolved: 2025-01-21

## 2025-01-21 PROCEDURE — 3023F SPIROM DOC REV: CPT | Performed by: NURSE PRACTITIONER

## 2025-01-21 PROCEDURE — 1160F RVW MEDS BY RX/DR IN RCRD: CPT | Performed by: NURSE PRACTITIONER

## 2025-01-21 PROCEDURE — 1036F TOBACCO NON-USER: CPT | Performed by: NURSE PRACTITIONER

## 2025-01-21 PROCEDURE — G8420 CALC BMI NORM PARAMETERS: HCPCS | Performed by: NURSE PRACTITIONER

## 2025-01-21 PROCEDURE — 1090F PRES/ABSN URINE INCON ASSESS: CPT | Performed by: NURSE PRACTITIONER

## 2025-01-21 PROCEDURE — 3077F SYST BP >= 140 MM HG: CPT | Performed by: NURSE PRACTITIONER

## 2025-01-21 PROCEDURE — G8399 PT W/DXA RESULTS DOCUMENT: HCPCS | Performed by: NURSE PRACTITIONER

## 2025-01-21 PROCEDURE — G8427 DOCREV CUR MEDS BY ELIG CLIN: HCPCS | Performed by: NURSE PRACTITIONER

## 2025-01-21 PROCEDURE — 1159F MED LIST DOCD IN RCRD: CPT | Performed by: NURSE PRACTITIONER

## 2025-01-21 PROCEDURE — 99214 OFFICE O/P EST MOD 30 MIN: CPT | Performed by: NURSE PRACTITIONER

## 2025-01-21 PROCEDURE — 3078F DIAST BP <80 MM HG: CPT | Performed by: NURSE PRACTITIONER

## 2025-01-21 PROCEDURE — 1123F ACP DISCUSS/DSCN MKR DOCD: CPT | Performed by: NURSE PRACTITIONER

## 2025-01-21 RX ORDER — METOPROLOL SUCCINATE 100 MG/1
100 TABLET, EXTENDED RELEASE ORAL DAILY
Qty: 90 TABLET | Refills: 0
Start: 2025-01-21 | End: 2025-01-21 | Stop reason: SDUPTHER

## 2025-01-21 RX ORDER — METOPROLOL SUCCINATE 50 MG/1
TABLET, EXTENDED RELEASE ORAL
Refills: 0 | OUTPATIENT
Start: 2025-01-21

## 2025-01-21 RX ORDER — METHYLPREDNISOLONE 4 MG/1
TABLET ORAL
Qty: 1 KIT | Refills: 0 | Status: SHIPPED | OUTPATIENT
Start: 2025-01-21 | End: 2025-01-27

## 2025-01-21 RX ORDER — METOPROLOL SUCCINATE 100 MG/1
100 TABLET, EXTENDED RELEASE ORAL DAILY
Qty: 90 TABLET | Refills: 0 | Status: SHIPPED | OUTPATIENT
Start: 2025-01-21

## 2025-01-21 RX ORDER — LISINOPRIL AND HYDROCHLOROTHIAZIDE 12.5; 2 MG/1; MG/1
1 TABLET ORAL DAILY
Qty: 90 TABLET | Refills: 0 | Status: SHIPPED | OUTPATIENT
Start: 2025-01-21

## 2025-01-21 ASSESSMENT — ENCOUNTER SYMPTOMS
ABDOMINAL PAIN: 0
DIARRHEA: 0
COUGH: 0
RHINORRHEA: 0
VOMITING: 0
CONSTIPATION: 0
EYE DISCHARGE: 0
CHEST TIGHTNESS: 0
SHORTNESS OF BREATH: 0

## 2025-01-21 ASSESSMENT — PATIENT HEALTH QUESTIONNAIRE - PHQ9
SUM OF ALL RESPONSES TO PHQ9 QUESTIONS 1 & 2: 0
SUM OF ALL RESPONSES TO PHQ QUESTIONS 1-9: 0
SUM OF ALL RESPONSES TO PHQ QUESTIONS 1-9: 0
2. FEELING DOWN, DEPRESSED OR HOPELESS: NOT AT ALL
SUM OF ALL RESPONSES TO PHQ QUESTIONS 1-9: 0
SUM OF ALL RESPONSES TO PHQ QUESTIONS 1-9: 0
1. LITTLE INTEREST OR PLEASURE IN DOING THINGS: NOT AT ALL

## 2025-01-21 NOTE — PROGRESS NOTES
Health Maintenance Due   Topic Date Due    DTaP/Tdap/Td vaccine (1 - Tdap) Never done       
Maternal Cousin                Objective       BP (!) 160/70 (Site: Left Upper Arm, Position: Sitting)   Pulse 65   Temp 97.1 °F (36.2 °C) (Temporal)   Resp 18   Ht 1.575 m (5' 2.01\")   Wt 54.3 kg (119 lb 9.6 oz)   SpO2 98%   BMI 21.87 kg/m²   Physical Exam  Vitals reviewed.   Constitutional:       Appearance: She is well-developed.   HENT:      Head: Normocephalic and atraumatic.      Right Ear: External ear normal.      Left Ear: External ear normal.   Eyes:      Conjunctiva/sclera: Conjunctivae normal.   Neck:      Thyroid: No thyromegaly.      Trachea: Trachea normal.   Cardiovascular:      Rate and Rhythm: Normal rate and regular rhythm.      Heart sounds: Normal heart sounds. No murmur heard.     No friction rub. No gallop.   Pulmonary:      Effort: Pulmonary effort is normal.      Breath sounds: Normal breath sounds.   Abdominal:      General: Bowel sounds are normal.      Palpations: Abdomen is soft.      Tenderness: There is no abdominal tenderness.   Musculoskeletal:         General: Normal range of motion.      Right elbow: Tenderness present in medial epicondyle and lateral epicondyle.      Cervical back: Normal range of motion and neck supple. No spinous process tenderness.   Skin:     General: Skin is warm and dry.      Findings: No erythema or rash.   Neurological:      Mental Status: She is alert and oriented to person, place, and time.   Psychiatric:         Speech: Speech normal.         Behavior: Behavior normal.         Thought Content: Thought content normal.       Data Reviewed and Summarized       Labs:     Imaging/Testing:        AZEEM MAHMOOD, APRN - CNP

## 2025-02-24 ENCOUNTER — LAB (OUTPATIENT)
Dept: FAMILY MEDICINE CLINIC | Age: 75
End: 2025-02-24
Payer: MEDICARE

## 2025-02-24 DIAGNOSIS — I10 ESSENTIAL HYPERTENSION: ICD-10-CM

## 2025-02-24 LAB
ALBUMIN SERPL BCG-MCNC: 4.6 G/DL (ref 3.5–5.1)
ALP SERPL-CCNC: 51 U/L (ref 38–126)
ALT SERPL W/O P-5'-P-CCNC: 22 U/L (ref 11–66)
ANION GAP SERPL CALC-SCNC: 11 MEQ/L (ref 8–16)
AST SERPL-CCNC: 19 U/L (ref 5–40)
BASOPHILS ABSOLUTE: 0.1 THOU/MM3 (ref 0–0.1)
BASOPHILS NFR BLD AUTO: 1.1 %
BILIRUB SERPL-MCNC: 0.8 MG/DL (ref 0.3–1.2)
BUN SERPL-MCNC: 15 MG/DL (ref 7–22)
CALCIUM SERPL-MCNC: 10.1 MG/DL (ref 8.2–9.6)
CHLORIDE SERPL-SCNC: 105 MEQ/L (ref 98–111)
CO2 SERPL-SCNC: 28 MEQ/L (ref 23–33)
CREAT SERPL-MCNC: 0.9 MG/DL (ref 0.4–1.2)
DEPRECATED RDW RBC AUTO: 45.2 FL (ref 35–45)
EOSINOPHIL NFR BLD AUTO: 2.7 %
EOSINOPHILS ABSOLUTE: 0.2 THOU/MM3 (ref 0–0.4)
ERYTHROCYTE [DISTWIDTH] IN BLOOD BY AUTOMATED COUNT: 12.7 % (ref 11.5–14.5)
GFR SERPL CREATININE-BSD FRML MDRD: 66 ML/MIN/1.73M2
GLUCOSE SERPL-MCNC: 90 MG/DL (ref 70–108)
HCT VFR BLD AUTO: 43.8 % (ref 37–47)
HGB BLD-MCNC: 15.2 GM/DL (ref 12–16)
IMM GRANULOCYTES # BLD AUTO: 0.02 THOU/MM3 (ref 0–0.07)
IMM GRANULOCYTES NFR BLD AUTO: 0.4 %
LYMPHOCYTES ABSOLUTE: 2.3 THOU/MM3 (ref 1–4.8)
LYMPHOCYTES NFR BLD AUTO: 39.6 %
MAGNESIUM SERPL-MCNC: 2 MG/DL (ref 1.6–2.4)
MCH RBC QN AUTO: 33.9 PG (ref 26–33)
MCHC RBC AUTO-ENTMCNC: 34.7 GM/DL (ref 32.2–35.5)
MCV RBC AUTO: 97.8 FL (ref 81–99)
MONOCYTES ABSOLUTE: 0.5 THOU/MM3 (ref 0.4–1.3)
MONOCYTES NFR BLD AUTO: 9.6 %
NEUTROPHILS ABSOLUTE: 2.7 THOU/MM3 (ref 1.8–7.7)
NEUTROPHILS NFR BLD AUTO: 46.6 %
NRBC BLD AUTO-RTO: 0 /100 WBC
PLATELET # BLD AUTO: 280 THOU/MM3 (ref 130–400)
PMV BLD AUTO: 11 FL (ref 9.4–12.4)
POTASSIUM SERPL-SCNC: 4.1 MEQ/L (ref 3.5–5.2)
PROT SERPL-MCNC: 6.8 G/DL (ref 6.1–8)
RBC # BLD AUTO: 4.48 MILL/MM3 (ref 4.2–5.4)
SODIUM SERPL-SCNC: 144 MEQ/L (ref 135–145)
TSH SERPL DL<=0.005 MIU/L-ACNC: 1.72 UIU/ML (ref 0.4–4.2)
WBC # BLD AUTO: 5.7 THOU/MM3 (ref 4.8–10.8)

## 2025-02-24 PROCEDURE — 36415 COLL VENOUS BLD VENIPUNCTURE: CPT | Performed by: NURSE PRACTITIONER

## 2025-03-02 DIAGNOSIS — E78.2 MIXED HYPERLIPIDEMIA: ICD-10-CM

## 2025-03-02 DIAGNOSIS — B00.1 RECURRENT HERPES LABIALIS: ICD-10-CM

## 2025-03-03 RX ORDER — ATORVASTATIN CALCIUM 10 MG/1
10 TABLET, FILM COATED ORAL DAILY
Qty: 90 TABLET | Refills: 3 | Status: SHIPPED | OUTPATIENT
Start: 2025-03-03

## 2025-03-03 RX ORDER — VALACYCLOVIR HYDROCHLORIDE 1 G/1
1000 TABLET, FILM COATED ORAL DAILY
Qty: 90 TABLET | Refills: 3 | Status: SHIPPED | OUTPATIENT
Start: 2025-03-03

## 2025-03-04 ENCOUNTER — OFFICE VISIT (OUTPATIENT)
Dept: FAMILY MEDICINE CLINIC | Age: 75
End: 2025-03-04
Payer: MEDICARE

## 2025-03-04 VITALS
BODY MASS INDEX: 22.88 KG/M2 | DIASTOLIC BLOOD PRESSURE: 72 MMHG | HEART RATE: 56 BPM | WEIGHT: 121.2 LBS | SYSTOLIC BLOOD PRESSURE: 136 MMHG | OXYGEN SATURATION: 98 % | HEIGHT: 61 IN

## 2025-03-04 DIAGNOSIS — G25.0 ESSENTIAL TREMOR: ICD-10-CM

## 2025-03-04 DIAGNOSIS — R19.4 FREQUENT BOWEL MOVEMENTS: Primary | ICD-10-CM

## 2025-03-04 DIAGNOSIS — H40.9 GLAUCOMA OF BOTH EYES, UNSPECIFIED GLAUCOMA TYPE: ICD-10-CM

## 2025-03-04 DIAGNOSIS — E78.2 MIXED HYPERLIPIDEMIA: ICD-10-CM

## 2025-03-04 DIAGNOSIS — I10 ESSENTIAL HYPERTENSION: ICD-10-CM

## 2025-03-04 DIAGNOSIS — I10 PRIMARY HYPERTENSION: ICD-10-CM

## 2025-03-04 PROCEDURE — 1090F PRES/ABSN URINE INCON ASSESS: CPT | Performed by: NURSE PRACTITIONER

## 2025-03-04 PROCEDURE — 3078F DIAST BP <80 MM HG: CPT | Performed by: NURSE PRACTITIONER

## 2025-03-04 PROCEDURE — 1036F TOBACCO NON-USER: CPT | Performed by: NURSE PRACTITIONER

## 2025-03-04 PROCEDURE — 1123F ACP DISCUSS/DSCN MKR DOCD: CPT | Performed by: NURSE PRACTITIONER

## 2025-03-04 PROCEDURE — G8427 DOCREV CUR MEDS BY ELIG CLIN: HCPCS | Performed by: NURSE PRACTITIONER

## 2025-03-04 PROCEDURE — G8420 CALC BMI NORM PARAMETERS: HCPCS | Performed by: NURSE PRACTITIONER

## 2025-03-04 PROCEDURE — G8399 PT W/DXA RESULTS DOCUMENT: HCPCS | Performed by: NURSE PRACTITIONER

## 2025-03-04 PROCEDURE — 1160F RVW MEDS BY RX/DR IN RCRD: CPT | Performed by: NURSE PRACTITIONER

## 2025-03-04 PROCEDURE — 99215 OFFICE O/P EST HI 40 MIN: CPT | Performed by: NURSE PRACTITIONER

## 2025-03-04 PROCEDURE — 3075F SYST BP GE 130 - 139MM HG: CPT | Performed by: NURSE PRACTITIONER

## 2025-03-04 PROCEDURE — 1159F MED LIST DOCD IN RCRD: CPT | Performed by: NURSE PRACTITIONER

## 2025-03-04 RX ORDER — TOPIRAMATE 25 MG/1
TABLET, FILM COATED ORAL
Qty: 187 TABLET | Refills: 0 | Status: SHIPPED | OUTPATIENT
Start: 2025-03-04 | End: 2025-06-09

## 2025-03-04 ASSESSMENT — ENCOUNTER SYMPTOMS
SHORTNESS OF BREATH: 0
CONSTIPATION: 0
DIARRHEA: 0
RHINORRHEA: 0
VOMITING: 0
EYE DISCHARGE: 0
ABDOMINAL PAIN: 0
COUGH: 0
CHEST TIGHTNESS: 0
ROS SKIN COMMENTS: DRY SKIN

## 2025-03-04 NOTE — PROGRESS NOTES
Lab Results   Component Value Date/Time    MG 2.0 02/24/2025 09:01 AM     Lab Results   Component Value Date     02/24/2025    K 4.1 02/24/2025     02/24/2025    CO2 28 02/24/2025    BUN 15 02/24/2025    CREATININE 0.9 02/24/2025    GLUCOSE 90 02/24/2025    CALCIUM 10.1 (H) 02/24/2025    BILITOT 0.8 02/24/2025    ALKPHOS 51 02/24/2025    AST 19 02/24/2025    ALT 22 02/24/2025    LABGLOM 66 02/24/2025    GFRAA >60 08/15/2022       Lab Results   Component Value Date    WBC 5.7 02/24/2025    HGB 15.2 02/24/2025    HCT 43.8 02/24/2025    MCV 97.8 02/24/2025     02/24/2025         The patient (or guardian, if applicable) and other individuals in attendance with the patient were advised that Artificial Intelligence will be utilized during this visit to record, process the conversation to generate a clinical note and to support improvement of the AI technology. The patient (or guardian, if applicable) and other individuals in attendance at the appointment consented to the use of AI, including the recording.      An electronic signature was used to authenticate this note.    --AZEEM MAHMOOD, APRN - CNP

## 2025-04-19 DIAGNOSIS — G25.0 ESSENTIAL TREMOR: ICD-10-CM

## 2025-04-21 RX ORDER — METOPROLOL SUCCINATE 100 MG/1
100 TABLET, EXTENDED RELEASE ORAL DAILY
Qty: 90 TABLET | Refills: 3 | Status: SHIPPED | OUTPATIENT
Start: 2025-04-21

## 2025-04-29 ENCOUNTER — APPOINTMENT (OUTPATIENT)
Dept: GENERAL RADIOLOGY | Age: 75
End: 2025-04-29
Payer: MEDICARE

## 2025-04-29 ENCOUNTER — APPOINTMENT (OUTPATIENT)
Dept: CT IMAGING | Age: 75
End: 2025-04-29
Payer: MEDICARE

## 2025-04-29 ENCOUNTER — HOSPITAL ENCOUNTER (INPATIENT)
Age: 75
LOS: 3 days | Discharge: HOME | End: 2025-05-02
Attending: EMERGENCY MEDICINE | Admitting: SURGERY
Payer: MEDICARE

## 2025-04-29 DIAGNOSIS — R55 SYNCOPE AND COLLAPSE: ICD-10-CM

## 2025-04-29 DIAGNOSIS — R55 SYNCOPE, UNSPECIFIED SYNCOPE TYPE: ICD-10-CM

## 2025-04-29 DIAGNOSIS — G25.0 ESSENTIAL TREMOR: ICD-10-CM

## 2025-04-29 DIAGNOSIS — S06.6X9A TRAUMATIC SUBARACHNOID HEMATOMA WITH LOSS OF CONSCIOUSNESS, INITIAL ENCOUNTER (HCC): Primary | ICD-10-CM

## 2025-04-29 PROBLEM — S00.03XA HEMATOMA OF OCCIPITAL REGION OF SCALP: Status: ACTIVE | Noted: 2025-04-29

## 2025-04-29 PROBLEM — S01.01XA: Status: ACTIVE | Noted: 2025-04-29

## 2025-04-29 PROBLEM — I60.9 SAH (SUBARACHNOID HEMORRHAGE) (HCC): Status: ACTIVE | Noted: 2025-04-29

## 2025-04-29 LAB
ALBUMIN SERPL BCG-MCNC: 4.3 G/DL (ref 3.4–4.9)
ALP SERPL-CCNC: 55 U/L (ref 38–126)
ALT SERPL W/O P-5'-P-CCNC: 22 U/L (ref 10–35)
ANION GAP SERPL CALC-SCNC: 13 MEQ/L (ref 8–16)
AST SERPL-CCNC: 20 U/L (ref 10–35)
BASOPHILS ABSOLUTE: 0 THOU/MM3 (ref 0–0.1)
BASOPHILS NFR BLD AUTO: 0.2 %
BILIRUB CONJ SERPL-MCNC: 0.4 MG/DL (ref 0–0.2)
BILIRUB SERPL-MCNC: 0.9 MG/DL (ref 0.3–1.2)
BUN SERPL-MCNC: 16 MG/DL (ref 8–23)
CALCIUM SERPL-MCNC: 10.1 MG/DL (ref 8.8–10.2)
CHLORIDE SERPL-SCNC: 107 MEQ/L (ref 98–111)
CO2 SERPL-SCNC: 25 MEQ/L (ref 22–29)
CREAT SERPL-MCNC: 0.9 MG/DL (ref 0.5–0.9)
DEPRECATED RDW RBC AUTO: 43.2 FL (ref 35–45)
EKG ATRIAL RATE: 72 BPM
EKG P AXIS: 73 DEGREES
EKG P-R INTERVAL: 164 MS
EKG Q-T INTERVAL: 406 MS
EKG QRS DURATION: 86 MS
EKG QTC CALCULATION (BAZETT): 444 MS
EKG R AXIS: 48 DEGREES
EKG T AXIS: 85 DEGREES
EKG VENTRICULAR RATE: 72 BPM
EOSINOPHIL NFR BLD AUTO: 0.1 %
EOSINOPHILS ABSOLUTE: 0 THOU/MM3 (ref 0–0.4)
ERYTHROCYTE [DISTWIDTH] IN BLOOD BY AUTOMATED COUNT: 12.6 % (ref 11.5–14.5)
GFR SERPL CREATININE-BSD FRML MDRD: 66 ML/MIN/1.73M2
GLUCOSE SERPL-MCNC: 117 MG/DL (ref 74–109)
HCT VFR BLD AUTO: 39.4 % (ref 37–47)
HGB BLD-MCNC: 13.9 GM/DL (ref 12–16)
IMM GRANULOCYTES # BLD AUTO: 0.03 THOU/MM3 (ref 0–0.07)
IMM GRANULOCYTES NFR BLD AUTO: 0.3 %
LYMPHOCYTES ABSOLUTE: 1.1 THOU/MM3 (ref 1–4.8)
LYMPHOCYTES NFR BLD AUTO: 12.1 %
MAGNESIUM SERPL-MCNC: 1.9 MG/DL (ref 1.6–2.6)
MCH RBC QN AUTO: 33.6 PG (ref 26–33)
MCHC RBC AUTO-ENTMCNC: 35.3 GM/DL (ref 32.2–35.5)
MCV RBC AUTO: 95.2 FL (ref 81–99)
MONOCYTES ABSOLUTE: 0.8 THOU/MM3 (ref 0.4–1.3)
MONOCYTES NFR BLD AUTO: 8.7 %
NEUTROPHILS ABSOLUTE: 6.8 THOU/MM3 (ref 1.8–7.7)
NEUTROPHILS NFR BLD AUTO: 78.6 %
NRBC BLD AUTO-RTO: 0 /100 WBC
NT-PROBNP SERPL IA-MCNC: 381 PG/ML (ref 0–449)
OSMOLALITY SERPL CALC.SUM OF ELEC: 290.9 MOSMOL/KG (ref 275–300)
PLATELET # BLD AUTO: 229 THOU/MM3 (ref 130–400)
PMV BLD AUTO: 9.8 FL (ref 9.4–12.4)
POTASSIUM SERPL-SCNC: 3.5 MEQ/L (ref 3.5–5.2)
PROT SERPL-MCNC: 6.8 G/DL (ref 6.4–8.3)
RBC # BLD AUTO: 4.14 MILL/MM3 (ref 4.2–5.4)
SODIUM SERPL-SCNC: 145 MEQ/L (ref 135–145)
TROPONIN, HIGH SENSITIVITY: 14 NG/L (ref 0–12)
TROPONIN, HIGH SENSITIVITY: 15 NG/L (ref 0–12)
WBC # BLD AUTO: 8.7 THOU/MM3 (ref 4.8–10.8)

## 2025-04-29 PROCEDURE — 85025 COMPLETE CBC W/AUTO DIFF WBC: CPT

## 2025-04-29 PROCEDURE — 96375 TX/PRO/DX INJ NEW DRUG ADDON: CPT

## 2025-04-29 PROCEDURE — 93005 ELECTROCARDIOGRAM TRACING: CPT | Performed by: EMERGENCY MEDICINE

## 2025-04-29 PROCEDURE — 6820000001 HC L2 TRAUMA SURGERY EVALUATION: Performed by: SURGERY

## 2025-04-29 PROCEDURE — 72125 CT NECK SPINE W/O DYE: CPT

## 2025-04-29 PROCEDURE — 6360000002 HC RX W HCPCS

## 2025-04-29 PROCEDURE — 2500000003 HC RX 250 WO HCPCS: Performed by: EMERGENCY MEDICINE

## 2025-04-29 PROCEDURE — 99223 1ST HOSP IP/OBS HIGH 75: CPT | Performed by: SURGERY

## 2025-04-29 PROCEDURE — 6370000000 HC RX 637 (ALT 250 FOR IP)

## 2025-04-29 PROCEDURE — 93010 ELECTROCARDIOGRAM REPORT: CPT | Performed by: NUCLEAR MEDICINE

## 2025-04-29 PROCEDURE — 83735 ASSAY OF MAGNESIUM: CPT

## 2025-04-29 PROCEDURE — 6360000002 HC RX W HCPCS: Performed by: EMERGENCY MEDICINE

## 2025-04-29 PROCEDURE — 83880 ASSAY OF NATRIURETIC PEPTIDE: CPT

## 2025-04-29 PROCEDURE — 70450 CT HEAD/BRAIN W/O DYE: CPT

## 2025-04-29 PROCEDURE — 80053 COMPREHEN METABOLIC PANEL: CPT

## 2025-04-29 PROCEDURE — 71046 X-RAY EXAM CHEST 2 VIEWS: CPT

## 2025-04-29 PROCEDURE — 96374 THER/PROPH/DIAG INJ IV PUSH: CPT

## 2025-04-29 PROCEDURE — 2000000000 HC ICU R&B

## 2025-04-29 PROCEDURE — 36415 COLL VENOUS BLD VENIPUNCTURE: CPT

## 2025-04-29 PROCEDURE — 99285 EMERGENCY DEPT VISIT HI MDM: CPT

## 2025-04-29 PROCEDURE — 6370000000 HC RX 637 (ALT 250 FOR IP): Performed by: SURGERY

## 2025-04-29 PROCEDURE — 2580000003 HC RX 258: Performed by: SURGERY

## 2025-04-29 PROCEDURE — 82248 BILIRUBIN DIRECT: CPT

## 2025-04-29 PROCEDURE — 84484 ASSAY OF TROPONIN QUANT: CPT

## 2025-04-29 RX ORDER — HYDROCHLOROTHIAZIDE 25 MG/1
12.5 TABLET ORAL DAILY
Status: DISCONTINUED | OUTPATIENT
Start: 2025-04-30 | End: 2025-05-02 | Stop reason: HOSPADM

## 2025-04-29 RX ORDER — ONDANSETRON 4 MG/1
4 TABLET, ORALLY DISINTEGRATING ORAL EVERY 8 HOURS PRN
Status: DISCONTINUED | OUTPATIENT
Start: 2025-04-29 | End: 2025-05-02 | Stop reason: HOSPADM

## 2025-04-29 RX ORDER — POTASSIUM CHLORIDE 29.8 MG/ML
20 INJECTION INTRAVENOUS PRN
Status: DISCONTINUED | OUTPATIENT
Start: 2025-04-29 | End: 2025-05-02 | Stop reason: HOSPADM

## 2025-04-29 RX ORDER — FLUTICASONE PROPIONATE 50 MCG
2 SPRAY, SUSPENSION (ML) NASAL DAILY PRN
Status: DISCONTINUED | OUTPATIENT
Start: 2025-04-29 | End: 2025-05-02 | Stop reason: HOSPADM

## 2025-04-29 RX ORDER — LISINOPRIL 20 MG/1
20 TABLET ORAL DAILY
Status: DISCONTINUED | OUTPATIENT
Start: 2025-04-30 | End: 2025-05-02 | Stop reason: HOSPADM

## 2025-04-29 RX ORDER — MONTELUKAST SODIUM 10 MG/1
10 TABLET ORAL NIGHTLY
Status: DISCONTINUED | OUTPATIENT
Start: 2025-04-29 | End: 2025-05-02 | Stop reason: HOSPADM

## 2025-04-29 RX ORDER — 3% SODIUM CHLORIDE 3 G/100ML
25 INJECTION, SOLUTION INTRAVENOUS CONTINUOUS
Status: DISCONTINUED | OUTPATIENT
Start: 2025-04-29 | End: 2025-04-30

## 2025-04-29 RX ORDER — POTASSIUM CHLORIDE 7.45 MG/ML
10 INJECTION INTRAVENOUS PRN
Status: DISCONTINUED | OUTPATIENT
Start: 2025-04-29 | End: 2025-05-02 | Stop reason: HOSPADM

## 2025-04-29 RX ORDER — GINSENG 100 MG
CAPSULE ORAL 2 TIMES DAILY
Status: DISCONTINUED | OUTPATIENT
Start: 2025-04-29 | End: 2025-05-02 | Stop reason: HOSPADM

## 2025-04-29 RX ORDER — TRANEXAMIC ACID 10 MG/ML
1000 INJECTION, SOLUTION INTRAVENOUS ONCE
Status: COMPLETED | OUTPATIENT
Start: 2025-04-29 | End: 2025-04-30

## 2025-04-29 RX ORDER — METOPROLOL SUCCINATE 100 MG/1
100 TABLET, EXTENDED RELEASE ORAL DAILY
Status: DISCONTINUED | OUTPATIENT
Start: 2025-04-29 | End: 2025-05-02 | Stop reason: HOSPADM

## 2025-04-29 RX ORDER — LEVETIRACETAM 500 MG/5ML
1000 INJECTION, SOLUTION, CONCENTRATE INTRAVENOUS ONCE
Status: COMPLETED | OUTPATIENT
Start: 2025-04-29 | End: 2025-04-29

## 2025-04-29 RX ORDER — SODIUM CHLORIDE 9 MG/ML
INJECTION, SOLUTION INTRAVENOUS PRN
Status: DISCONTINUED | OUTPATIENT
Start: 2025-04-29 | End: 2025-05-02 | Stop reason: HOSPADM

## 2025-04-29 RX ORDER — LATANOPROST 50 UG/ML
1 SOLUTION/ DROPS OPHTHALMIC NIGHTLY
Status: DISCONTINUED | OUTPATIENT
Start: 2025-04-29 | End: 2025-04-29

## 2025-04-29 RX ORDER — ONDANSETRON 2 MG/ML
INJECTION INTRAMUSCULAR; INTRAVENOUS
Status: COMPLETED
Start: 2025-04-29 | End: 2025-04-29

## 2025-04-29 RX ORDER — LEVETIRACETAM 500 MG/1
500 TABLET ORAL EVERY 12 HOURS
Status: DISCONTINUED | OUTPATIENT
Start: 2025-04-30 | End: 2025-05-02 | Stop reason: HOSPADM

## 2025-04-29 RX ORDER — MAGNESIUM SULFATE IN WATER 40 MG/ML
2000 INJECTION, SOLUTION INTRAVENOUS PRN
Status: DISCONTINUED | OUTPATIENT
Start: 2025-04-29 | End: 2025-05-02 | Stop reason: HOSPADM

## 2025-04-29 RX ORDER — ONDANSETRON 2 MG/ML
4 INJECTION INTRAMUSCULAR; INTRAVENOUS EVERY 6 HOURS PRN
Status: DISCONTINUED | OUTPATIENT
Start: 2025-04-29 | End: 2025-05-02 | Stop reason: HOSPADM

## 2025-04-29 RX ORDER — SODIUM CHLORIDE 0.9 % (FLUSH) 0.9 %
5-40 SYRINGE (ML) INJECTION EVERY 12 HOURS SCHEDULED
Status: DISCONTINUED | OUTPATIENT
Start: 2025-04-29 | End: 2025-05-02 | Stop reason: HOSPADM

## 2025-04-29 RX ORDER — LISINOPRIL AND HYDROCHLOROTHIAZIDE 12.5; 2 MG/1; MG/1
1 TABLET ORAL DAILY
Status: DISCONTINUED | OUTPATIENT
Start: 2025-04-29 | End: 2025-04-29

## 2025-04-29 RX ORDER — SODIUM CHLORIDE 9 MG/ML
INJECTION, SOLUTION INTRAVENOUS CONTINUOUS
Status: DISCONTINUED | OUTPATIENT
Start: 2025-04-29 | End: 2025-04-29

## 2025-04-29 RX ORDER — PROCHLORPERAZINE EDISYLATE 5 MG/ML
10 INJECTION INTRAMUSCULAR; INTRAVENOUS EVERY 6 HOURS PRN
Status: DISCONTINUED | OUTPATIENT
Start: 2025-04-29 | End: 2025-05-02 | Stop reason: HOSPADM

## 2025-04-29 RX ORDER — SODIUM CHLORIDE 0.9 % (FLUSH) 0.9 %
5-40 SYRINGE (ML) INJECTION PRN
Status: DISCONTINUED | OUTPATIENT
Start: 2025-04-29 | End: 2025-05-02 | Stop reason: HOSPADM

## 2025-04-29 RX ORDER — TOPIRAMATE 25 MG/1
25 TABLET, FILM COATED ORAL 2 TIMES DAILY
Status: DISCONTINUED | OUTPATIENT
Start: 2025-04-29 | End: 2025-05-02 | Stop reason: HOSPADM

## 2025-04-29 RX ORDER — BUDESONIDE 3 MG/1
3 CAPSULE, COATED PELLETS ORAL DAILY
Status: DISCONTINUED | OUTPATIENT
Start: 2025-04-30 | End: 2025-05-02 | Stop reason: HOSPADM

## 2025-04-29 RX ORDER — TRANEXAMIC ACID 10 MG/ML
1000 INJECTION, SOLUTION INTRAVENOUS ONCE
Status: COMPLETED | OUTPATIENT
Start: 2025-04-29 | End: 2025-04-29

## 2025-04-29 RX ORDER — FENTANYL CITRATE 50 UG/ML
50 INJECTION, SOLUTION INTRAMUSCULAR; INTRAVENOUS
Status: DISCONTINUED | OUTPATIENT
Start: 2025-04-29 | End: 2025-05-02 | Stop reason: HOSPADM

## 2025-04-29 RX ORDER — ONDANSETRON 2 MG/ML
4 INJECTION INTRAMUSCULAR; INTRAVENOUS ONCE
Status: COMPLETED | OUTPATIENT
Start: 2025-04-29 | End: 2025-04-29

## 2025-04-29 RX ORDER — FENTANYL CITRATE 50 UG/ML
25 INJECTION, SOLUTION INTRAMUSCULAR; INTRAVENOUS
Status: DISCONTINUED | OUTPATIENT
Start: 2025-04-29 | End: 2025-05-02 | Stop reason: HOSPADM

## 2025-04-29 RX ORDER — ACETAMINOPHEN 325 MG/1
650 TABLET ORAL EVERY 4 HOURS PRN
Status: DISCONTINUED | OUTPATIENT
Start: 2025-04-29 | End: 2025-05-02 | Stop reason: HOSPADM

## 2025-04-29 RX ORDER — POLYETHYLENE GLYCOL 3350 17 G/17G
17 POWDER, FOR SOLUTION ORAL DAILY
Status: DISCONTINUED | OUTPATIENT
Start: 2025-04-29 | End: 2025-05-02 | Stop reason: HOSPADM

## 2025-04-29 RX ORDER — DORZOLAMIDE HCL 20 MG/ML
1 SOLUTION/ DROPS OPHTHALMIC 3 TIMES DAILY
Status: DISCONTINUED | OUTPATIENT
Start: 2025-04-29 | End: 2025-05-02 | Stop reason: HOSPADM

## 2025-04-29 RX ADMIN — PROCHLORPERAZINE EDISYLATE 10 MG: 5 INJECTION INTRAMUSCULAR; INTRAVENOUS at 21:26

## 2025-04-29 RX ADMIN — TRANEXAMIC ACID 1000 MG: 10 INJECTION, SOLUTION INTRAVENOUS at 18:33

## 2025-04-29 RX ADMIN — TOPIRAMATE 25 MG: 25 TABLET, FILM COATED ORAL at 21:38

## 2025-04-29 RX ADMIN — BACITRACIN: 500 OINTMENT TOPICAL at 21:38

## 2025-04-29 RX ADMIN — METOPROLOL SUCCINATE 100 MG: 100 TABLET, EXTENDED RELEASE ORAL at 21:38

## 2025-04-29 RX ADMIN — ONDANSETRON 4 MG: 2 INJECTION INTRAMUSCULAR; INTRAVENOUS at 18:29

## 2025-04-29 RX ADMIN — MONTELUKAST 10 MG: 10 TABLET, FILM COATED ORAL at 21:38

## 2025-04-29 RX ADMIN — DORZOLAMIDE HYDROCHLORIDE 1 DROP: 20 SOLUTION/ DROPS OPHTHALMIC at 21:38

## 2025-04-29 RX ADMIN — ONDANSETRON 4 MG: 2 INJECTION, SOLUTION INTRAMUSCULAR; INTRAVENOUS at 18:29

## 2025-04-29 RX ADMIN — ACETAMINOPHEN 650 MG: 325 TABLET ORAL at 21:26

## 2025-04-29 RX ADMIN — TRANEXAMIC ACID 1000 MG: 10 INJECTION, SOLUTION INTRAVENOUS at 18:22

## 2025-04-29 RX ADMIN — LEVETIRACETAM 1000 MG: 100 INJECTION INTRAVENOUS at 18:15

## 2025-04-29 RX ADMIN — SODIUM CHLORIDE: 0.9 INJECTION, SOLUTION INTRAVENOUS at 21:43

## 2025-04-29 ASSESSMENT — PAIN DESCRIPTION - LOCATION
LOCATION: HEAD

## 2025-04-29 ASSESSMENT — PAIN - FUNCTIONAL ASSESSMENT: PAIN_FUNCTIONAL_ASSESSMENT: 0-10

## 2025-04-29 ASSESSMENT — PAIN SCALES - GENERAL
PAINLEVEL_OUTOF10: 5
PAINLEVEL_OUTOF10: 0
PAINLEVEL_OUTOF10: 5

## 2025-04-29 ASSESSMENT — PAIN DESCRIPTION - FREQUENCY: FREQUENCY: CONTINUOUS

## 2025-04-29 ASSESSMENT — PAIN DESCRIPTION - DESCRIPTORS
DESCRIPTORS: ACHING
DESCRIPTORS: ACHING

## 2025-04-29 NOTE — ED NOTES
Handoff report from Tano VÁZQUEZ. Patient resting in bed. Respirations easy and unlabored. No distress noted. Call light within reach.  Updated on POC.

## 2025-04-29 NOTE — ED NOTES
RN placed patient in cervical collar. Pt talking on phone with family. No additional needs expressed by patient.

## 2025-04-29 NOTE — ED NOTES
Pt presents to the ED via EMS after patient had a fall last night. Pt states she lives at home by herself and woke up this morning with blood throughout her house and thinks she fell. Pt states she remembers going to the restroom around [phone] while watching the news and walked to the restroom and had diarrhea which is not uncommon for her. Pt states she thinks she fell outside the restroom due to having large amount of dried blood outside of the restroom. Pt does not recall the fall. Pt has lac on posterior head that is scabbed and no active bleeding noted. Pt states she woke up today around 1200 and was confused about what happened and was laying in bed. Pt states she is having a 5/10 HA. Pt reports she is nauseous. Pt has tremors which she states is not new. Pt respirations are even and unlabored.

## 2025-04-29 NOTE — ED NOTES
PRE-SURGICAL INSTRUCTIONS        Patient's Name:  Saige Malave      KBPDQ'M Date:  2/28/2022            Covid Testing Date and Time:    Surgery Date:  3/1/2022                1. Do NOT eat or drink anything, including candy, gum, or ice chips after midnight on 02/28, unless you have specific instructions from your surgeon or anesthesia provider to do so.  2. You may brush your teeth before coming to the hospital.  3. No smoking 24 hours prior to the day of surgery. 4. No alcohol 24 hours prior to the day of surgery. 5. No recreational drugs for one week prior to the day of surgery. 6. Leave all valuables, including money/purse, at home. 7. Remove all jewelry, nail polish, acrylic nails, and makeup (including mascara); no lotions powders, deodorant, or perfume/cologne/after shave on the skin. 8. Follow instruction for Hibiclens washes and CHG wipes from surgeon's office. 9. Glasses/contact lenses and dentures may be worn to the hospital.  They will be removed prior to surgery. 10. Call your doctor if symptoms of a cold or illness develop within 24-48 hours prior to your surgery. 11.  If you are having an outpatient procedure, please make arrangements for a responsible ADULT TO [address] [place] [address] and stay with you for 24 hours after your surgery. 12. ONE VISITOR in the hospital at this time for outpatient procedures. Exceptions may be made for surgical admissions, per nursing unit guidelines      Special Instructions:      Bring inhaler. Bring any pertinent legal medical records. Take these medications the morning of surgery with a sip of water:  NONE  Complete bowel prep per MD instructions. On the day of surgery, come in the main entrance of [facility]. Let the  at the desk know you are there for surgery. A staff member will come escort you to the surgical area on the second floor.     If you have any questions or concerns, please do not hesitate Pt off the floor at CT.   to call:     (Prior to the day of surgery) [facility] department:  [phone]   (Day of surgery) Pre-Op department:  [phone]    These surgical instructions were reviewed with Latosha Reina during the [facility] phone call.

## 2025-04-29 NOTE — H&P
voice recognition software. It may contain minor errors which are inherent in voice recognition technology.** Electronically signed by Dr. Subhash Van    CT HEAD WO CONTRAST  Result Date: 4/29/2025  1. Acute subarachnoid hemorrhages bilaterally. The critical  finding(s) was called to Dr. Gertrudis Bridges at 1850 hours on 4/29/2025 by Dr. Willard. Verbal acknowledgment and readback was given. 2. Sequela of chronic microvascular changes. 3. A large left scalp hematoma **This report has been created using voice recognition software.  It may contain minor errors which are inherent in voice recognition technology.** Electronically signed by Dr. Luann Willard        A/P:  Active Hospital Problems    Diagnosis     SAH (subarachnoid hemorrhage) (HCC) [I60.9]      Priority: High    Syncope and collapse [R55]      Priority: Medium    Laceration of occipital region of scalp without complication [S01.01XA]      Priority: Low    Hematoma of occipital region of scalp [S00.03XA]      Priority: Low            Plan; DISPOSITION: Admit to CCU/ICU  Neurosurgery consult he was contacted while patient was in the ED  Admit to the ICU  Repeat CT head no contrast in a.m.  N.p.o. for now  Keppra loading already given will continue Keppra  TXA already given as a bolus and her drip 8-hour infusion was already started  We will consult the intensivist for evaluation of likely syncope and collapse leading to her fall as well as some mild T wave changes on her EKG and a mild elevation in her troponin

## 2025-04-29 NOTE — ED NOTES
Patient resting in bed. Respirations easy and unlabored. Pt stating she is becoming nauseous again and requesting emesis bag. Call light within reach.

## 2025-04-29 NOTE — ED PROVIDER NOTES
fluticasone (FLONASE) 50 MCG/ACT nasal spray 2 spray (has no administration in time range)   metoprolol succinate (TOPROL XL) extended release tablet 100 mg (100 mg Oral Given 4/29/25 2138)   montelukast (SINGULAIR) tablet 10 mg (10 mg Oral Given 4/29/25 2138)   Netarsudil Dimesylate (RHOPRESSA) 0.02 % ophthalmic solution SOLN 1 drop (Patient Supplied) (has no administration in time range)   topiramate (TOPAMAX) tablet 25 mg (25 mg Oral Given 4/29/25 2138)   sodium chloride flush 0.9 % injection 5-40 mL ( IntraVENous Not Given 4/29/25 2128)   sodium chloride flush 0.9 % injection 5-40 mL (has no administration in time range)   0.9 % sodium chloride infusion (has no administration in time range)   potassium chloride 20 mEq/50 mL IVPB (Central Line) ( IntraVENous See Alternative 4/30/25 0631)     Or   potassium chloride 10 mEq/100 mL IVPB (Peripheral Line) (10 mEq IntraVENous New Bag 4/30/25 0631)   magnesium sulfate 2000 mg in 50 mL IVPB premix (has no administration in time range)   bacitracin ointment ( Topical Given 4/29/25 2138)   ondansetron (ZOFRAN-ODT) disintegrating tablet 4 mg (has no administration in time range)     Or   ondansetron (ZOFRAN) injection 4 mg (has no administration in time range)   polyethylene glycol (GLYCOLAX) packet 17 g (17 g Oral Not Given 4/29/25 2128)   fentaNYL (SUBLIMAZE) injection 25 mcg (has no administration in time range)     Or   fentaNYL (SUBLIMAZE) injection 50 mcg (has no administration in time range)   levETIRAcetam (KEPPRA) tablet 500 mg (500 mg Oral Given 4/30/25 0538)   lisinopril (PRINIVIL;ZESTRIL) tablet 20 mg (has no administration in time range)     And   hydroCHLOROthiazide (HYDRODIURIL) tablet 12.5 mg (has no administration in time range)   acetaminophen (TYLENOL) tablet 650 mg (650 mg Oral Given 4/29/25 2126)   prochlorperazine (COMPAZINE) injection 10 mg (10 mg IntraVENous Given 4/29/25 2126)   3% sodium chloride (HYPERTONIC) IV infusion (25 mL/hr IntraVENous  New Bag 4/30/25 0310)   atorvastatin (LIPITOR) tablet 10 mg (has no administration in time range)   tranexamic acid-NaCl IVPB premix 1,000 mg (0 mg IntraVENous Stopped 4/29/25 1831)     Followed by   tranexamic acid-NaCl IVPB premix 1,000 mg (0 mg IntraVENous Stopped 4/30/25 0222)   levETIRAcetam (KEPPRA) injection 1,000 mg (1,000 mg IntraVENous Given 4/29/25 1815)   ondansetron (ZOFRAN) injection 4 mg (4 mg IntraVENous Given 4/29/25 1829)         ED Course as of 04/30/25 0632   Tue Apr 29, 2025   1801 Paged Dr. Triana. [DD]      ED Course User Index  [DD] Siva Dotson DO         PROCEDURES: (None if blank)  Procedures:     CRITICAL CARE:  None    MEDICATION CHANGES     Current Discharge Medication List            FINAL DISPOSITION   Shared Decision-Making was performed, disposition discussed with the patient/family and questions answered.      Outpatient follow up (If applicable):  No follow-up provider specified.  The patient’s provisional diagnosis and plan of care were discussed with the patient and present family who expressed understanding. Medications were reviewed and indications and risks of medications were discussed with the patient/family present. Strict return precautions and follow up instructions were discussed with the patient prior to discharge, with which the patient agrees.              FINAL DIAGNOSES:  Final diagnoses:   Traumatic subarachnoid hematoma with loss of consciousness, initial encounter (HCC)   Syncope, unspecified syncope type       Condition: condition: fair  Dispo: Admit to CCU/ICU  DISPOSITION Admitted 04/29/2025 07:12:50 PM               This transcription was electronically signed. It was dictated by use of voice recognition software and electronically transcribed. The transcription may contain errors not detected in proofreading.    Electronically signed by Gertrudis Bridges MD on 4/29/25 at 4:34 PM EDT

## 2025-04-30 ENCOUNTER — APPOINTMENT (OUTPATIENT)
Dept: CT IMAGING | Age: 75
End: 2025-04-30
Payer: MEDICARE

## 2025-04-30 ENCOUNTER — APPOINTMENT (OUTPATIENT)
Age: 75
End: 2025-04-30
Payer: MEDICARE

## 2025-04-30 PROBLEM — S06.6X9A TRAUMATIC SUBARACHNOID HEMATOMA WITH LOSS OF CONSCIOUSNESS (HCC): Status: ACTIVE | Noted: 2025-04-30

## 2025-04-30 PROBLEM — I61.5 INTRAVENTRICULAR HEMORRHAGE (HCC): Status: ACTIVE | Noted: 2025-04-30

## 2025-04-30 PROBLEM — S06.5XAA SDH (SUBDURAL HEMATOMA) (HCC): Status: ACTIVE | Noted: 2025-04-30

## 2025-04-30 LAB
ANION GAP SERPL CALC-SCNC: 12 MEQ/L (ref 8–16)
BUN SERPL-MCNC: 19 MG/DL (ref 8–23)
CALCIUM SERPL-MCNC: 9 MG/DL (ref 8.8–10.2)
CHLORIDE SERPL-SCNC: 108 MEQ/L (ref 98–111)
CO2 SERPL-SCNC: 24 MEQ/L (ref 22–29)
CREAT SERPL-MCNC: 0.9 MG/DL (ref 0.5–0.9)
DEPRECATED RDW RBC AUTO: 43.8 FL (ref 35–45)
ECHO AO ASC DIAM: 3.5 CM
ECHO AO ASCENDING AORTA INDEX: 2.43 CM/M2
ECHO AR MAX VEL PISA: 3.8 M/S
ECHO AV CUSP MM: 1.6 CM
ECHO AV MEAN GRADIENT: 4 MMHG
ECHO AV MEAN VELOCITY: 0.9 M/S
ECHO AV PEAK GRADIENT: 10 MMHG
ECHO AV PEAK VELOCITY: 1.6 M/S
ECHO AV REGURGITANT PHT: 608 MS
ECHO AV VELOCITY RATIO: 0.75
ECHO AV VTI: 35.8 CM
ECHO BSA: 1.51 M2
ECHO EST RA PRESSURE: 3 MMHG
ECHO LA AREA 2C: 14.9 CM2
ECHO LA AREA 4C: 14.2 CM2
ECHO LA DIAMETER INDEX: 2.01 CM/M2
ECHO LA DIAMETER: 2.9 CM
ECHO LA MAJOR AXIS: 5.5 CM
ECHO LA MINOR AXIS: 5.3 CM
ECHO LA VOL BP: 33 ML (ref 22–52)
ECHO LA VOL MOD A2C: 34 ML (ref 22–52)
ECHO LA VOL MOD A4C: 30 ML (ref 22–52)
ECHO LA VOL/BSA BIPLANE: 23 ML/M2 (ref 16–34)
ECHO LA VOLUME INDEX MOD A2C: 24 ML/M2 (ref 16–34)
ECHO LA VOLUME INDEX MOD A4C: 21 ML/M2 (ref 16–34)
ECHO LV E' LATERAL VELOCITY: 8.8 CM/S
ECHO LV E' SEPTAL VELOCITY: 7.7 CM/S
ECHO LV EDV A2C: 67 ML
ECHO LV EDV A4C: 58 ML
ECHO LV EDV INDEX A4C: 40 ML/M2
ECHO LV EDV NDEX A2C: 47 ML/M2
ECHO LV EF PHYSICIAN: 60 %
ECHO LV EJECTION FRACTION A2C: 64 %
ECHO LV EJECTION FRACTION A4C: 59 %
ECHO LV EJECTION FRACTION BIPLANE: 59 % (ref 55–100)
ECHO LV ESV A2C: 24 ML
ECHO LV ESV A4C: 24 ML
ECHO LV ESV INDEX A2C: 17 ML/M2
ECHO LV ESV INDEX A4C: 17 ML/M2
ECHO LV FRACTIONAL SHORTENING: 33 % (ref 28–44)
ECHO LV INTERNAL DIMENSION DIASTOLE INDEX: 2.71 CM/M2
ECHO LV INTERNAL DIMENSION DIASTOLIC: 3.9 CM (ref 3.9–5.3)
ECHO LV INTERNAL DIMENSION SYSTOLIC INDEX: 1.81 CM/M2
ECHO LV INTERNAL DIMENSION SYSTOLIC: 2.6 CM
ECHO LV ISOVOLUMETRIC RELAXATION TIME (IVRT): 70 MS
ECHO LV IVSD: 1 CM (ref 0.6–0.9)
ECHO LV MASS 2D: 122.1 G (ref 67–162)
ECHO LV MASS INDEX 2D: 84.8 G/M2 (ref 43–95)
ECHO LV POSTERIOR WALL DIASTOLIC: 1 CM (ref 0.6–0.9)
ECHO LV RELATIVE WALL THICKNESS RATIO: 0.51
ECHO LVOT AV VTI INDEX: 0.87
ECHO LVOT MEAN GRADIENT: 3 MMHG
ECHO LVOT PEAK GRADIENT: 6 MMHG
ECHO LVOT PEAK VELOCITY: 1.2 M/S
ECHO LVOT VTI: 31 CM
ECHO MV A VELOCITY: 0.77 M/S
ECHO MV E DECELERATION TIME (DT): 224 MS
ECHO MV E VELOCITY: 0.88 M/S
ECHO MV E/A RATIO: 1.14
ECHO MV E/E' LATERAL: 10
ECHO MV E/E' RATIO (AVERAGED): 10.71
ECHO MV E/E' SEPTAL: 11.43
ECHO PULMONARY ARTERY END DIASTOLIC PRESSURE: 5 MMHG
ECHO PV MAX VELOCITY: 0.6 M/S
ECHO PV PEAK GRADIENT: 2 MMHG
ECHO PV REGURGITANT MAX VELOCITY: 1.2 M/S
ECHO RIGHT VENTRICULAR SYSTOLIC PRESSURE (RVSP): 26 MMHG
ECHO RV INTERNAL DIMENSION: 2.9 CM
ECHO TV E WAVE: 0.6 M/S
ECHO TV REGURGITANT MAX VELOCITY: 2.4 M/S
ECHO TV REGURGITANT PEAK GRADIENT: 23 MMHG
ERYTHROCYTE [DISTWIDTH] IN BLOOD BY AUTOMATED COUNT: 12.5 % (ref 11.5–14.5)
GFR SERPL CREATININE-BSD FRML MDRD: 66 ML/MIN/1.73M2
GLUCOSE SERPL-MCNC: 94 MG/DL (ref 74–109)
HCT VFR BLD AUTO: 34.8 % (ref 37–47)
HGB BLD-MCNC: 12.3 GM/DL (ref 12–16)
MCH RBC QN AUTO: 34 PG (ref 26–33)
MCHC RBC AUTO-ENTMCNC: 35.3 GM/DL (ref 32.2–35.5)
MCV RBC AUTO: 96.1 FL (ref 81–99)
PLATELET # BLD AUTO: 217 THOU/MM3 (ref 130–400)
PMV BLD AUTO: 10.1 FL (ref 9.4–12.4)
POTASSIUM SERPL-SCNC: 3.2 MEQ/L (ref 3.5–5.2)
POTASSIUM SERPL-SCNC: 3.9 MEQ/L (ref 3.5–5.2)
RBC # BLD AUTO: 3.62 MILL/MM3 (ref 4.2–5.4)
SODIUM SERPL-SCNC: 141 MEQ/L (ref 135–145)
SODIUM SERPL-SCNC: 141 MEQ/L (ref 135–145)
SODIUM SERPL-SCNC: 144 MEQ/L (ref 135–145)
WBC # BLD AUTO: 8.1 THOU/MM3 (ref 4.8–10.8)

## 2025-04-30 PROCEDURE — 99233 SBSQ HOSP IP/OBS HIGH 50: CPT | Performed by: INTERNAL MEDICINE

## 2025-04-30 PROCEDURE — 36415 COLL VENOUS BLD VENIPUNCTURE: CPT

## 2025-04-30 PROCEDURE — 97535 SELF CARE MNGMENT TRAINING: CPT

## 2025-04-30 PROCEDURE — 6360000002 HC RX W HCPCS: Performed by: SURGERY

## 2025-04-30 PROCEDURE — 2580000003 HC RX 258

## 2025-04-30 PROCEDURE — 99232 SBSQ HOSP IP/OBS MODERATE 35: CPT | Performed by: SURGERY

## 2025-04-30 PROCEDURE — 2500000003 HC RX 250 WO HCPCS: Performed by: SURGERY

## 2025-04-30 PROCEDURE — 93306 TTE W/DOPPLER COMPLETE: CPT

## 2025-04-30 PROCEDURE — 85027 COMPLETE CBC AUTOMATED: CPT

## 2025-04-30 PROCEDURE — 70450 CT HEAD/BRAIN W/O DYE: CPT

## 2025-04-30 PROCEDURE — 84132 ASSAY OF SERUM POTASSIUM: CPT

## 2025-04-30 PROCEDURE — 6370000000 HC RX 637 (ALT 250 FOR IP)

## 2025-04-30 PROCEDURE — 6370000000 HC RX 637 (ALT 250 FOR IP): Performed by: SURGERY

## 2025-04-30 PROCEDURE — 84295 ASSAY OF SERUM SODIUM: CPT

## 2025-04-30 PROCEDURE — 51798 US URINE CAPACITY MEASURE: CPT

## 2025-04-30 PROCEDURE — 2060000000 HC ICU INTERMEDIATE R&B

## 2025-04-30 PROCEDURE — 97166 OT EVAL MOD COMPLEX 45 MIN: CPT

## 2025-04-30 PROCEDURE — 97530 THERAPEUTIC ACTIVITIES: CPT

## 2025-04-30 PROCEDURE — 2580000003 HC RX 258: Performed by: SURGERY

## 2025-04-30 PROCEDURE — APPSS60 APP SPLIT SHARED TIME 46-60 MINUTES: Performed by: NURSE PRACTITIONER

## 2025-04-30 PROCEDURE — 94761 N-INVAS EAR/PLS OXIMETRY MLT: CPT

## 2025-04-30 PROCEDURE — 80048 BASIC METABOLIC PNL TOTAL CA: CPT

## 2025-04-30 PROCEDURE — 93306 TTE W/DOPPLER COMPLETE: CPT | Performed by: INTERNAL MEDICINE

## 2025-04-30 PROCEDURE — APPSS30 APP SPLIT SHARED TIME 16-30 MINUTES: Performed by: NURSE PRACTITIONER

## 2025-04-30 RX ORDER — ATORVASTATIN CALCIUM 10 MG/1
10 TABLET, FILM COATED ORAL DAILY
Status: DISCONTINUED | OUTPATIENT
Start: 2025-04-30 | End: 2025-05-02 | Stop reason: HOSPADM

## 2025-04-30 RX ORDER — HYDRALAZINE HYDROCHLORIDE 20 MG/ML
5 INJECTION INTRAMUSCULAR; INTRAVENOUS EVERY 6 HOURS PRN
Status: DISCONTINUED | OUTPATIENT
Start: 2025-04-30 | End: 2025-05-02 | Stop reason: HOSPADM

## 2025-04-30 RX ADMIN — BACITRACIN: 500 OINTMENT TOPICAL at 19:55

## 2025-04-30 RX ADMIN — MONTELUKAST 10 MG: 10 TABLET, FILM COATED ORAL at 19:55

## 2025-04-30 RX ADMIN — POTASSIUM CHLORIDE 10 MEQ: 7.46 INJECTION, SOLUTION INTRAVENOUS at 05:45

## 2025-04-30 RX ADMIN — TOPIRAMATE 25 MG: 25 TABLET, FILM COATED ORAL at 08:04

## 2025-04-30 RX ADMIN — POTASSIUM CHLORIDE 10 MEQ: 7.46 INJECTION, SOLUTION INTRAVENOUS at 07:37

## 2025-04-30 RX ADMIN — BACITRACIN: 500 OINTMENT TOPICAL at 08:04

## 2025-04-30 RX ADMIN — LEVETIRACETAM 500 MG: 500 TABLET, FILM COATED ORAL at 05:38

## 2025-04-30 RX ADMIN — LISINOPRIL 20 MG: 20 TABLET ORAL at 08:04

## 2025-04-30 RX ADMIN — SODIUM CHLORIDE: 0.9 INJECTION, SOLUTION INTRAVENOUS at 08:33

## 2025-04-30 RX ADMIN — SODIUM CHLORIDE 25 ML/HR: 3 INJECTION, SOLUTION INTRAVENOUS at 03:10

## 2025-04-30 RX ADMIN — LEVETIRACETAM 500 MG: 500 TABLET, FILM COATED ORAL at 17:42

## 2025-04-30 RX ADMIN — SODIUM CHLORIDE, PRESERVATIVE FREE 10 ML: 5 INJECTION INTRAVENOUS at 08:04

## 2025-04-30 RX ADMIN — DORZOLAMIDE HYDROCHLORIDE 1 DROP: 20 SOLUTION/ DROPS OPHTHALMIC at 19:55

## 2025-04-30 RX ADMIN — POTASSIUM CHLORIDE 10 MEQ: 7.46 INJECTION, SOLUTION INTRAVENOUS at 06:31

## 2025-04-30 RX ADMIN — DORZOLAMIDE HYDROCHLORIDE 1 DROP: 20 SOLUTION/ DROPS OPHTHALMIC at 08:04

## 2025-04-30 RX ADMIN — POTASSIUM CHLORIDE 10 MEQ: 7.46 INJECTION, SOLUTION INTRAVENOUS at 08:54

## 2025-04-30 RX ADMIN — BUDESONIDE 3 MG: 3 CAPSULE ORAL at 08:04

## 2025-04-30 RX ADMIN — HYDROCHLOROTHIAZIDE 12.5 MG: 25 TABLET ORAL at 08:04

## 2025-04-30 RX ADMIN — ATORVASTATIN CALCIUM 10 MG: 10 TABLET, FILM COATED ORAL at 08:04

## 2025-04-30 RX ADMIN — SODIUM CHLORIDE, PRESERVATIVE FREE 10 ML: 5 INJECTION INTRAVENOUS at 19:55

## 2025-04-30 RX ADMIN — TOPIRAMATE 25 MG: 25 TABLET, FILM COATED ORAL at 19:55

## 2025-04-30 RX ADMIN — DORZOLAMIDE HYDROCHLORIDE 1 DROP: 20 SOLUTION/ DROPS OPHTHALMIC at 14:41

## 2025-04-30 ASSESSMENT — ENCOUNTER SYMPTOMS
CHEST TIGHTNESS: 0
SHORTNESS OF BREATH: 0
VOICE CHANGE: 0
VOMITING: 0
COLOR CHANGE: 0
TROUBLE SWALLOWING: 0
CHOKING: 0
PHOTOPHOBIA: 0
ABDOMINAL DISTENTION: 0
WHEEZING: 0
BACK PAIN: 0
NAUSEA: 0
COUGH: 0

## 2025-04-30 ASSESSMENT — PATIENT HEALTH QUESTIONNAIRE - PHQ9
SUM OF ALL RESPONSES TO PHQ QUESTIONS 1-9: 0
SUM OF ALL RESPONSES TO PHQ QUESTIONS 1-9: 0
2. FEELING DOWN, DEPRESSED OR HOPELESS: NOT AT ALL
1. LITTLE INTEREST OR PLEASURE IN DOING THINGS: NOT AT ALL
SUM OF ALL RESPONSES TO PHQ QUESTIONS 1-9: 0
SUM OF ALL RESPONSES TO PHQ QUESTIONS 1-9: 0

## 2025-04-30 ASSESSMENT — PAIN - FUNCTIONAL ASSESSMENT: PAIN_FUNCTIONAL_ASSESSMENT: ACTIVITIES ARE NOT PREVENTED

## 2025-04-30 ASSESSMENT — LIFESTYLE VARIABLES
HOW MANY STANDARD DRINKS CONTAINING ALCOHOL DO YOU HAVE ON A TYPICAL DAY: 1 OR 2
HOW OFTEN DO YOU HAVE A DRINK CONTAINING ALCOHOL: 4 OR MORE TIMES A WEEK

## 2025-04-30 ASSESSMENT — PAIN DESCRIPTION - ONSET: ONSET: ON-GOING

## 2025-04-30 ASSESSMENT — PAIN SCALES - GENERAL
PAINLEVEL_OUTOF10: 0
PAINLEVEL_OUTOF10: 5

## 2025-04-30 ASSESSMENT — PAIN DESCRIPTION - PAIN TYPE: TYPE: ACUTE PAIN

## 2025-04-30 ASSESSMENT — PAIN DESCRIPTION - FREQUENCY: FREQUENCY: INTERMITTENT

## 2025-04-30 ASSESSMENT — PAIN DESCRIPTION - DESCRIPTORS: DESCRIPTORS: ACHING

## 2025-04-30 ASSESSMENT — PAIN DESCRIPTION - LOCATION: LOCATION: HEAD

## 2025-04-30 NOTE — PROGRESS NOTES
[facility]  INPATIENT OCCUPATIONAL THERAPY  STR[facility] ICU 4D  EVALUATION      Discharge Recommendations: Continue to assess pending progress (would recommend HH services if going straight home as pt has very limited social support.)  Equipment Recommendations: No        Time In: 1331  Time Out: 1402  Timed Code Treatment Minutes: 23 Minutes  Minutes: 31          Date: 2025  Patient Name: Nicci Mooney,   Gender: female      MRN: [id number]  : 1950  (74 y.o.)  Referring Practitioner: Nav Womack DO  Diagnosis: SAH (subarachnoid hemorrhage) (HCC)  Additional Pertinent Hx: 74F PMHx hypertension, hyperlipidemia presenting s/p questionable syncopal with fall and head strike to posterior aspect of scalp approximately 36-48 hours ago.  Patient is not a reliable historian as she has vague memory of event however she is A/OX4 and GCS 15 at this time.  CT on arrival shows acute subarachnoid hemorrhages bilaterally with a large left scalp hematoma.  Repeat head CT this morning shows small acute hemorrhages in the bilateral ventricles. Neurosurgery recommends no need for surgical management at this time.    Restrictions/Precautions:  Restrictions/Precautions: Fall Risk    Subjective  Chart Reviewed: Yes, Orders, Progress Notes, History and Physical    Subjective: NUrse approved OT eval. Pt in bed on OT arrival, pleasant adn cooperative. A+Ox4. Pt is agreeable to evaluation.    Pain: very slight headache pt reports (more with activity)    Vitals: Blood Pressure: 124/59 at rest; 124/42 sitting EOB; 143/62 after activity  Oxygen: >95% with activity  Heart Rate: mid 60s    Social/Functional History:  Lives With: Alone  Type of Home: House  Home Layout: Two level, Work area in basement, Bed/Bath upstairs, 1/2 bath on main level  Home Access: Stairs to enter with rails  Entrance Stairs - Number of Steps: 2 [address] home;  ~13 steps to basement where she keeps her cat supplies; ~13 steps to upstairs where

## 2025-04-30 NOTE — CARE COORDINATION
Case Management Assessment Initial Evaluation    Date/Time of Evaluation: 2025 9:15 AM  Assessment Completed by: Roxy Becker RN    If patient is discharged prior to next notation, then this note serves as note for discharge by case management.    Patient Name: Nicci Mooney                   YOB: 1950  Diagnosis: SAH (subarachnoid hemorrhage) (HCC) [I60.9]                   Date / Time: 2025  4:19 PM  Location: [address] [place] [address]     Patient Admission Status: Inpatient   Readmission Risk Low 0-14, Mod 15-19), High > 20: Readmission Risk Score: 11.2    Current PCP: Kristie Celaya APRN - CNP  Health Care Decision Makers:   Primary Decision Maker: Pallavi Gaines Call First - Child - [phone]    Secondary Decision Maker: Isacc Mooney - Child - [phone]    Additional Case Management Notes: Presented to ED after a fall at home. Pt got up around 11:00 to go to the bathroom, believes this may have been where she fell when coming out as there was dried blood on the floor when she woke and woke up confused. Presented to ED with laceration to posterior scalp, c/o pain, headache, and nausea. Found to have bilateral SAH and occipital scalp hematoma. TXA given. Admitted to ICU. Neurosurgery and Intensivist consulted. No plan for surgical intervention; Neurosurgery signed off today. Echo ordered.     On room air. Afebrile. NSR. NIH 0. Ox4. Follows commands. SLP/PT/OT. Trauma Services and Intensivist following. Dietitian consulted. Telemetry, C-coller, SCDs. PO keppra, topamax, Electrolyte replacement protocols. Received loading dose of IV keppra x1 yesterday. Trop 15, direct bili 0.4.     Procedures: none    Imagin/29 CT Head: 1. Acute subarachnoid hemorrhages bilaterally.   2. Sequela of chronic microvascular changes.  3. A large left scalp hematoma   CT Cervical spine: 1. No fracture or spondylolisthesis of the cervical spine.   2. Multilevel degenerative disc disease, neuroforaminal

## 2025-04-30 NOTE — PROGRESS NOTES
Patient arrived to unit from ED via bed. Patient transferred to ICU bed and placed on continuous ICU bedside monitor. Patient admitted for SAH (subarachnoid hemorrhage) (HCC) [I60.9]. Vitals obtained. See flowsheets. Patient's IV access includes 20g IV L AC and 20g IV R AC. Current infusions and rates of infusion include TAX @12.5. Assessment completed by Hai RN. Two nurse skin assessment completed by Hai VÁZQUEZ and Christin VÁZQUEZ. See flowsheets for assessment details. Policies and procedures of ICU unable to be explained to patient at this time. Family member(s)/representative(s) present at time of admission include none. Patient rights explained to family member(s)/representatives and patient, as able. Patient/patient's family member(s)/representative(s) Declined to have physician notified of their admission. All questions posed by patient's family member(s)/representative(s) and patient answered at this time.

## 2025-04-30 NOTE — PROGRESS NOTES
Comprehensive Nutrition Assessment    Type and Reason for Visit:  Initial, Consult (ONS recommendations; wt. loss)    Nutrition Recommendations/Plan:   Continue Regular diet as per Physician - pt. Selects as tolerated with her colitis  Recommend MVI  Discontinue ONS per pt. request     Malnutrition Assessment:  Malnutrition Status:  At risk for malnutrition (25 [id number])    Context:  Acute Illness     Findings of the 6 clinical characteristics of malnutrition:  Energy Intake:  No decrease in energy intake (per pt. good appetite pta)  Weight Loss:  No weight loss (per pt. UBW is 114-116# - no significant loss since 10/2024 EMR records)     Body Fat Loss:  No body fat loss (age related loss)     Muscle Mass Loss:  No muscle mass loss (age related loss)    Fluid Accumulation:  No fluid accumulation     Strength:  Not Performed (per pt. very active pta)    Nutrition Assessment:     Pt improving from a nutritional standpoint AEB started regular diet - passed SLP swallow evaluation - no dysphagia s/p SAH d/t fall at home; admit wt. Corrected and pt. Is at UBW.   Remains at risk for nutritional compromise r/t BMI 20.89, colitis and underlying medical condition GERD, HLD, HTN.       Nutrition Related Findings:    Pt. Report/Treatments/Miscellaneous: pt. Seen; spoke with RN; pt. Reports good appetite pta with no weight loss - reweighed pt. This pm as ? Bedscale admit and pt. Is at UBW; thin build; has had diarrhea this past week d/t colitis flare up which she controls with meds usually - improved now; pt. Feels she fell d/t \"feeling dizzy from dehydrated\" ; did well with OT prior to RD arrival; lives home alone since spouse passed - she did gain wt. After he  ~2 years ago and is now \"back to usual wt\"; does not like Ensure products and limits milk intake and feels her appetite will be fine - large lunch came today which she ate 50% of per pt.  GI Status: BM x1   Pertinent Labs: glucose 94  BUN 19  creatinine

## 2025-04-30 NOTE — PROGRESS NOTES
CRITICAL CARE PROGRESS NOTE      Patient:  Nicci Mooney    Unit/Bed:4D-12/012-A  YOB: 1950  MRN: [id number]   PCP: Kristie Celaya APRN - CNP  Date of Admission: 4/29/2025  Chief Complaint:-Subarachnoid hemorrhage    Assessment and Plan:    Bilateral subdural hematomas s/p fall: Triggered by head strike.  Patient fell at home hitting posterior head.  Unclear timeline, appears to be ~36-48 hours prior to arrival.  Trauma is primary, neurosurgery consulted.  TXA LD/8hr bolus given. Management goals overnight SBP <160, sodium 145-150 with 4-hour rechecks, repeat head CT in the morning, and seizure precautions Keppra 500 Mg twice daily.   Repeat head CT showing small acute blood in atrial bilateral lateral ventricles, which appears to be new. On my review the bleed does not appear to have expanded.  Trauma and neurosurgery examined patient, agreed she is very stable. Per TXA protocol patient has to have neurochecks hourly for 24h from administration. Patient will need ICU stay until 2000 tonight before transferring.  3% saline discontinued. Trend sodium daily.   BP control: Resume lisinopril-HCTZ, Toprol XL unheld. PRN Hydralazine for SBP > 160. Off nicardipine.  Continue hourly neurochecks  Continue Keppra, fall, seizure precautions.   Patient on adult Diet   PT/OT/SLP ordered.  Questionable syncope: Patient noted to ED staff that she lightheadedness prior to fall 2/2 ongoing diarrhea.  She does not remember the event, and unknown loss of consciousness.  Also unknown downtime.  Patient denies history of seizures, no notable seizure-like activity.  EEG was held off.  Pending echo, orthostatic vitals.  Hypokalemia: Potassium replacement IV in place.  Monitor with recheck at noon.  Glaucoma: Continue dorzolamide, Rhopressa  Allergy: Continue single  HTN: Lisinopril, hydrochlorothiazide, metoprolol with hold parameter  HLD: Lipitor      INITIAL H AND P AND ICU COURSE:  74F PMHx hypertension, hyperlipidemia

## 2025-04-30 NOTE — PROGRESS NOTES
[facility]  Trauma Surgery - Dr. Rupert Cabrera  Daily Progress Note  Pt Name: Nicci Mooney  Medical Record Number: [id number]  Date of Birth 1950   Today's Date: 4/30/2025    HD: # 1    CC: Headache    ASSESSMENT  1.  Active Hospital Problems    Diagnosis Date Noted    SDH (subdural hematoma) (HCC) [S06.5XAA] 04/30/2025    Intraventricular hemorrhage (HCC) [I61.5] 04/30/2025    Syncope and collapse [R55] 04/29/2025    SAH (subarachnoid hemorrhage) (HCC) [I60.9] 04/29/2025    Laceration of occipital region of scalp without complication [S01.01XA] 04/29/2025    Hematoma of occipital region of scalp [S00.03XA] 04/29/2025         PLAN  Admitted to ICU under Trauma Services   - 04/30: Transfer to Step-down    Trauma by fall   - Fall precautions   - PT, OT continue to treat    Subarachnoid hemorrhage   - CT head on arrival indicates bilateral areas of subarachnoid hemorrhages layering the tentorium   - Consult neurosurgery    - Neuro checks per unit routine   - Maintain systolic blood pressure between 100-160   - Trauma dose TXA given    - Elevate head of bed approximately 30 degrees   - Hold all anticoagulant and antiplatelet medications   - Repeat head CT in AM   - Hypertonic saline to keep Na+ 145-150   - Seizure precautions, Keppra load 1 G then 500 mg BID   - 04/30: Repeat head CT this AM notes acute subarachnoid hemorrhages in the right frontal and bilateral parietal lobes.  There is small acute subdural blood along the tentorium bilaterally.  There is small acute blood layering in the atria of bilateral lateral ventricles. Hypertonic saline stopped.  Neurosurgery recommends no intervention at this time.  Patient can be discharged from neurosurgery perspective once cleared by other services.  Repeat CT head in 1 week with follow up to PCP.  Continue Keppra for 7 days.  May resume anticoagulation medications after 1 week.  Neurosurgery signing off, will see PRN.      Scalp hematoma   - CT head   Bowel sounds normal active  EXTREMITIES: No cyanosis or edema.  PMS intact in all four extremities.  No extremity tenderness to palpation.  Range of motion intact.  Strength 5/5 bilaterally with  strength and plantar/dorsiflexion.  SKIN: Warm and dry        LABS  CBC :   Recent Labs     04/29/25 1654 04/30/25  0346   WBC 8.7 8.1   HGB 13.9 12.3   HCT 39.4 34.8*   MCV 95.2 96.1    217     BMP:   Recent Labs     04/29/25  1654 04/30/25  0346    144   K 3.5 3.2*    108   CO2 25 24   BUN 16 19   CREATININE 0.9 0.9     COAGS: No results for input(s): \"APTT\", \"INR\" in the last 72 hours.    Invalid input(s): \"PROT\"  Pancreas/HFP:  No results for input(s): \"LIPASE\", \"AMYLASE\" in the last 72 hours.  Recent Labs     04/29/25 1654   AST 20   ALT 22   BILIDIR 0.4*   BILITOT 0.9   ALKPHOS 55       RADIOLOGY  CT HEAD WO CONTRAST  Result Date: 4/30/2025  ** ADDENDUM #1 ** This report was discussed with Marifer Rosado RN on Apr 30, 2025 05:39:00 EDT. This document has been electronically signed by: Joanna Rhoades on 04/30/2025 05:39 AM ** ORIGINAL REPORT ** CT head without contrast COMPARISON: 04/29/2025 5:08 p. M. FINDINGS: There is atrophy and chronic microvascular ischemia. There are acute subarachnoid hemorrhages in the right frontal and bilateral parietal lobes. There is small acute subdural blood along the tentorium bilaterally. There is small acute blood layering in the atria of bilateral lateral ventricles. There is no hydrocephalus or midline shift. The visualized paranasal sinuses and mastoid air cells are clear. There is soft tissue swelling and large scalp hematoma overlying the left posterolateral calvarium and extending to the left lateral and right posterolateral calvarium. No fracture is seen.     Small acute blood in the atria of bilateral lateral ventricles, new. The remainder of the examination is grossly unchanged. This document has been electronically signed by: Anurag Kwong MD on

## 2025-04-30 NOTE — ED NOTES
C-collar taken off at this time per Dr. Bridges. Patient resting in bed. Respirations easy and unlabored. No distress noted. Call light within reach.

## 2025-04-30 NOTE — PROGRESS NOTES
SBIRT screening completed per trauma protocol. SBIRT Findings are Negative.  Patient denies alcohol and/or drug use. Also denies depressive symptoms.    Brief Intervention and Referral to Treatment Summary N/A    Patient was provided PHQ-9, AUDIT-C and DAST Screening:      PHQ-9 Score:  Negative  AUDIT-C Score:  Negative  DAST Score:   Negative    Patient’s substance use is considered     Negative      Patient’s depression is considered:     Minimal    Brief Education Was Provided    Not applicable as results are negative.      Brief Intervention(s) Provided  at time of screening(Only for AUDIT-C or DAST)     Not applicable as results are negative.    Injured Trauma Survivor Screening  1.  When you were injured or right afterward   Did you think you were going to die? RESPONSES; YES+1/NO: NO  Do you think this was done to you intentionally? NO    Since your injury  Have you felt more restless, tense or jumpy than usual? RESPONSES; YES+1/NO: NO  Have you found yourself unable to stop worrying? RESPONSES; YES+1/NO: NO  Do you find yourself thinking that the world is unsafe and that people are not to be trusted? RESPONSES; YES+1/NO: NO    TOTAL SCORE from ITSS Questions 1 and 2:   0  NOTE: A score of greater than or equal to 2 is considered positive for PTSD risk and is to receive a community resource packet to link with appropriate providers.    Recommendations/Referrals for Brief and/or Specialized Treatment Provided to Patient  N/A

## 2025-04-30 NOTE — PROGRESS NOTES
Stroke folder given to patient which includes a pamphlet entitled \"The stroke club at [facility]\" and Stroke Education book.      Stroke Folder given.   What is Stroke/CVA  Signs and Symptoms of stroke (BEFAST)  Treatments for Stroke  Personal Risk Factors for Stroke discussed  Education--Call 911      Patient/family has been educated on their personal risk factors of:  Hypertension  Previous stroke  previous TIA  Hyperlipidemia  smoking cessation  Atrial fibrillation  Carotid Artery stenosis  diabetes  other(specify)    They have been given hand outs on the following medications:(give handouts/attach to AVS)   asa  Plavix  coumadin  statins(Specify)  antihypertensive(specify)    Treatment for stroke includes:  Risk factor modifications  Following the medication regime prescribed by physician      Educated on FAST-Face-Arm-Speech-Time    A stroke is a brain attack.Stroke is a brain injury. It occurs when the brain's blood supply is interrupted. Blood carries oxygen and nutrients to the brain. Without oxygen and nutrients from blood, brain tissue starts to die rapidly. This can happen in less than 10 minutes.    A stroke occurs when blood flow to the brain is blocked (called ischemic stroke). This is caused by one of the following:   Sudden decreased blood flow   Damage to a blood vessel supplying blood to the brain can occur suddenly from either:   Injury   A clot that forms and breaks off from another part of the body (such as the heart or neck)   There are certain conditions which predispose people to form blood clots, such as:   Cancer   Pregnancy   Atrial fibrillation   Certain autoimmune diseases   Local blood clot   A build-up of fatty substances ( atherosclerotic plaque ) along the inner lining of the artery causes:   Narrowing of artery   Reduced elasticity   Local inflammation   Blood protein defects leading to increased clotting tendency   Decreased blood flow in the artery   Clot in an artery supplying  the brain   Inflammatory conditions in the blood vessels (vasculitis)   A stroke may also occur if a blood vessel breaks and bleeds into or around the brain. This is called hemorrhagic stroke.      This condition needs to be monitored closely. Be sure to keep all appointments. Have exams and blood tests done as directed.     Call 911 If Any of the Following Occurs   It is important that you and those around you know the warning signs for stroke. CALL 911 immediately if you have any of the following which may suggest a new stroke:   Sudden weakness or numbness of face, arm, or leg, especially on one side of the body   Sudden confusion   Sudden trouble speaking or understanding   Sudden trouble seeing in one or both eyes   Sudden dizziness, trouble walking, loss of balance, or coordination   Sudden severe headache with no known cause   If you think you have an emergency, CALL 911       To help reduce your risk of stroke, take the following steps:   Eat a well-balanced diet. The DASH diet rich in fruits, vegetables and low-fat dairy foods, and low in saturated fat, total fat, and cholesterolmay help keep your blood pressure in the healthy range.   Exercise regularly.   Maintain a healthy weight. (Your body mass index should be below 25.)   If you smoke, quit .   Drink alcohol in moderation. Moderate is two or fewer drinks per day for men and one or fewer drinks per day for women and older adults.  Control your diabetes    All patient/family questions were answered and teach back method was utilized.

## 2025-04-30 NOTE — CONSULTS
Hysterectomy; Tonsillectomy;  section; Breast surgery; Septoplasty (2016); turbinate resection (2016); eye surgery (20 and 21); Hysterectomy, total abdominal (); and Upper gastrointestinal endoscopy (21).    SOCIAL HISTORY:   Social History     Tobacco Use    Smoking status: Former     Current packs/day: 0.00     Average packs/day: 0.3 packs/day for 2.0 years (0.5 ttl pk-yrs)     Types: Cigarettes     Start date:      Quit date:      Years since quittin.3    Smokeless tobacco: Never    Tobacco comments:     cannot give exact dates -started in , quit in    Substance Use Topics    Alcohol use: Yes     Alcohol/week: 5.0 standard drinks of alcohol     Types: 5 Shots of liquor per week     Comment: daily glass of wine       FAMILY HISTORY:  Family History   Problem Relation Age of Onset    High Cholesterol Mother     Cancer Mother          age 74    Hypertension Father     Stroke Father     High Blood Pressure Father          age 74    Alcohol Abuse Sister             Substance Abuse Sister     Breast Cancer Maternal Grandmother             Asthma Brother     Breast Cancer Maternal Aunt             Breast Cancer Maternal Aunt     Breast Cancer Maternal Cousin                LABS  CBC with Differential:    Lab Results   Component Value Date/Time    WBC 8.1 2025 03:46 AM    RBC 3.62 2025 03:46 AM    HGB 12.3 2025 03:46 AM    HCT 34.8 2025 03:46 AM     2025 03:46 AM    MCV 96.1 2025 03:46 AM    MCH 34.0 2025 03:46 AM    MCHC 35.3 2025 03:46 AM    RDW 13.2 2023 11:16 PM    NRBC 0 2025 04:54 PM    LYMPHOPCT 26 2023 11:16 PM    MONOPCT 8.7 2025 04:54 PM    EOSPCT 4 2023 11:16 PM    BASOPCT 1 2023 11:16 PM    MONOSABS 0.8 2025 04:54 PM    LYMPHSABS 1.1 2025 04:54 PM    EOSABS 0.0 2025 04:54 PM    BASOSABS 0.0 2025  2    ankle clonus  absent absent   toes (babinski)  absent absent             Patient seen and plan of care discussed with Neurosurgeon Attending Dr. Mae HASSAN-CNP  4/30/2025  8:30 AM

## 2025-04-30 NOTE — CONSULTS
CRITICAL CARE CONSULT NOTE      Patient:  Nicci Mooney    Unit/Bed:4D-12/012-A  YOB: 1950  MRN: [id number]   PCP: Kristie Celaya APRN - CNP  Date of Admission: 4/29/2025  Chief Complaint:-Subdural hematoma    Assessment and Plan:    Bilateral subdural hematoma: Patient had a fall at home, hitting her head. CT head report states acute subarachnoid hemorrhages bilaterally with large left scalp hematoma.  - Trauma primary  - Pain management per primary  - Neurosurgery consulted  - Goal SBP <160  - Hold anticoagulation and antiplatelet medications  - Repeat CT head in a.m.  - Neurochecks  - Fall and seizure precautions  -.Keppra 500 mg twice daily  - Hypertonic saline with goal sodium 145-150  - Sodium every 4 hours  -NPO    Questionable syncope:  Patient stated she was lightheaded the ED prior to her fall 2/2 diarrhea.  Patient does not remember the event and unknown loss of consciousness.  Patient denies any history of seizures, she has shown no signs of seizure-like activity, will hold off on EEG.  - Echo pending  - Orthostatic vitals    Glaucoma: Continue dorzolamide, Rhopressa  Allergies: Continue Singulair  HTN: Continue lisinopril, HCTZ, metoprolol with hold parameters  HLD: Continue Lipitor    INITIAL H AND P AND ICU COURSE:  74-year-old female with PMH of GERD, glaucoma, HTN, HLD presents with subdural hematoma.  Patient stated day prior to admission she went to the bathroom between 11-12 p.m. and had a fall.  Patient does not remember the event, unknown LOC.  Patient was down for an unknown amount of time but overnight she went to her bed and in the morning she saw dried blood on the carpet where the fall occurred and due to the amount of blood came to the ED to get checked out.  The patient stated she has had diarrhea for the last several days and she felt lightheaded the evening prior to her fall.    Past Medical History: GERD, glaucoma, HTN, HLD.  Family History: Mother had HLD and cancer.   Father had HTN, CVA.  Sister had alcohol and substance abuse.  Brother had asthma.  Social History: Former smoker quit in 1993, 0.5 pack years.  Daily glass of wine and no drug use.    ROS   Patient denied fever, chills, chest pain, palpitations, SOB, abdominal pain, constipation, dysuria, lower extremity edema.  She endorsed lightheadedness, nausea, vomiting, headache.    Scheduled Meds:   tranexamic acid  1,000 mg IntraVENous Once    dorzolamide  1 drop Both Eyes TID    [START ON 4/30/2025] budesonide  3 mg Oral Daily    metoprolol succinate  100 mg Oral Daily    montelukast  10 mg Oral Nightly    [START ON 4/30/2025] Netarsudil Dimesylate  1 drop Ophthalmic Daily    topiramate  25 mg Oral BID    sodium chloride flush  5-40 mL IntraVENous 2 times per day    bacitracin   Topical BID    polyethylene glycol  17 g Oral Daily    [START ON 4/30/2025] levETIRAcetam  500 mg Oral Q12H    [START ON 4/30/2025] lisinopril  20 mg Oral Daily    And    [START ON 4/30/2025] hydroCHLOROthiazide  12.5 mg Oral Daily     Continuous Infusions:   sodium chloride      sodium chloride 125 mL/hr at 04/29/25 2143       PHYSICAL EXAMINATION:  T: 98.7.  P: XT 3. RR: 14. B/P: 137/59.  FiO2: 21. O2 Sat: 97.  I/O: NA  Body mass index is 18.75 kg/m².   GCS:   15    General:   Elderly white female laying in bed  HEENT:  normocephalic and has posterior scalp laceration.  No scleral icterus. PERR  Neck: supple.  No Thyromegaly.  Lungs: clear to auscultation.  No retractions  Cardiac: RRR.  No JVD.  Abdomen: soft.  Nontender.  Extremities:  No clubbing, cyanosis, or edema x 4.    Vasculature: capillary refill < 3 seconds. Palpable dorsalis pedis pulses.  Skin:  warm and dry.  Psych:  Alert and oriented x3.  Affect appropriate  Lymph:  No supraclavicular adenopathy.  Neurologic:  No focal deficit. No seizures.    Data: (All radiographs, tracings, PFTs, and imaging are personally viewed and interpreted unless otherwise noted).   Sodium 145, K 3.5,

## 2025-04-30 NOTE — PROGRESS NOTES
Attempted trauma services SBIRT screening. Pt with medical staff receiving care. KATHIA to re-attempt.

## 2025-04-30 NOTE — PLAN OF CARE
Problem: Discharge Planning  Goal: Discharge to home or other facility with appropriate resources  Outcome: Progressing     Problem: Pain  Goal: Verbalizes/displays adequate comfort level or baseline comfort level  Outcome: Progressing     Problem: Safety - Adult  Goal: Free from fall injury  Outcome: Progressing     Care plan reviewed with patient.  Patient verbalizes understanding of the plan of care and contributes to goal setting.

## 2025-05-01 ENCOUNTER — APPOINTMENT (OUTPATIENT)
Dept: CT IMAGING | Age: 75
End: 2025-05-01
Payer: MEDICARE

## 2025-05-01 LAB
ANION GAP SERPL CALC-SCNC: 11 MEQ/L (ref 8–16)
BUN SERPL-MCNC: 15 MG/DL (ref 8–23)
CALCIUM SERPL-MCNC: 9.6 MG/DL (ref 8.8–10.2)
CHLORIDE SERPL-SCNC: 108 MEQ/L (ref 98–111)
CO2 SERPL-SCNC: 21 MEQ/L (ref 22–29)
CREAT SERPL-MCNC: 0.8 MG/DL (ref 0.5–0.9)
DEPRECATED RDW RBC AUTO: 44.1 FL (ref 35–45)
ERYTHROCYTE [DISTWIDTH] IN BLOOD BY AUTOMATED COUNT: 12.4 % (ref 11.5–14.5)
GFR SERPL CREATININE-BSD FRML MDRD: 76 ML/MIN/1.73M2
GLUCOSE SERPL-MCNC: 113 MG/DL (ref 74–109)
HCT VFR BLD AUTO: 36.8 % (ref 37–47)
HGB BLD-MCNC: 13 GM/DL (ref 12–16)
MCH RBC QN AUTO: 34.4 PG (ref 26–33)
MCHC RBC AUTO-ENTMCNC: 35.3 GM/DL (ref 32.2–35.5)
MCV RBC AUTO: 97.4 FL (ref 81–99)
PLATELET # BLD AUTO: 219 THOU/MM3 (ref 130–400)
PMV BLD AUTO: 9.8 FL (ref 9.4–12.4)
POTASSIUM SERPL-SCNC: 3.7 MEQ/L (ref 3.5–5.2)
RBC # BLD AUTO: 3.78 MILL/MM3 (ref 4.2–5.4)
SODIUM SERPL-SCNC: 140 MEQ/L (ref 135–145)
WBC # BLD AUTO: 8.1 THOU/MM3 (ref 4.8–10.8)

## 2025-05-01 PROCEDURE — 99232 SBSQ HOSP IP/OBS MODERATE 35: CPT | Performed by: SURGERY

## 2025-05-01 PROCEDURE — 80048 BASIC METABOLIC PNL TOTAL CA: CPT

## 2025-05-01 PROCEDURE — 70450 CT HEAD/BRAIN W/O DYE: CPT

## 2025-05-01 PROCEDURE — 92523 SPEECH SOUND LANG COMPREHEN: CPT

## 2025-05-01 PROCEDURE — 2500000003 HC RX 250 WO HCPCS: Performed by: SURGERY

## 2025-05-01 PROCEDURE — 6370000000 HC RX 637 (ALT 250 FOR IP)

## 2025-05-01 PROCEDURE — 36415 COLL VENOUS BLD VENIPUNCTURE: CPT

## 2025-05-01 PROCEDURE — 97116 GAIT TRAINING THERAPY: CPT

## 2025-05-01 PROCEDURE — 6370000000 HC RX 637 (ALT 250 FOR IP): Performed by: SURGERY

## 2025-05-01 PROCEDURE — 97129 THER IVNTJ 1ST 15 MIN: CPT

## 2025-05-01 PROCEDURE — 97162 PT EVAL MOD COMPLEX 30 MIN: CPT

## 2025-05-01 PROCEDURE — 2060000000 HC ICU INTERMEDIATE R&B

## 2025-05-01 PROCEDURE — 6360000002 HC RX W HCPCS

## 2025-05-01 PROCEDURE — 92610 EVALUATE SWALLOWING FUNCTION: CPT

## 2025-05-01 PROCEDURE — 85027 COMPLETE CBC AUTOMATED: CPT

## 2025-05-01 PROCEDURE — 97535 SELF CARE MNGMENT TRAINING: CPT

## 2025-05-01 RX ADMIN — LEVETIRACETAM 500 MG: 500 TABLET, FILM COATED ORAL at 18:05

## 2025-05-01 RX ADMIN — METOPROLOL SUCCINATE 100 MG: 100 TABLET, EXTENDED RELEASE ORAL at 08:20

## 2025-05-01 RX ADMIN — MONTELUKAST 10 MG: 10 TABLET, FILM COATED ORAL at 19:38

## 2025-05-01 RX ADMIN — PROCHLORPERAZINE EDISYLATE 10 MG: 5 INJECTION INTRAMUSCULAR; INTRAVENOUS at 11:38

## 2025-05-01 RX ADMIN — BACITRACIN: 500 OINTMENT TOPICAL at 08:20

## 2025-05-01 RX ADMIN — TOPIRAMATE 25 MG: 25 TABLET, FILM COATED ORAL at 19:38

## 2025-05-01 RX ADMIN — BACITRACIN: 500 OINTMENT TOPICAL at 19:38

## 2025-05-01 RX ADMIN — SODIUM CHLORIDE, PRESERVATIVE FREE 10 ML: 5 INJECTION INTRAVENOUS at 19:38

## 2025-05-01 RX ADMIN — LISINOPRIL 20 MG: 20 TABLET ORAL at 08:20

## 2025-05-01 RX ADMIN — HYDROCHLOROTHIAZIDE 12.5 MG: 25 TABLET ORAL at 08:20

## 2025-05-01 RX ADMIN — DORZOLAMIDE HYDROCHLORIDE 1 DROP: 20 SOLUTION/ DROPS OPHTHALMIC at 14:04

## 2025-05-01 RX ADMIN — DORZOLAMIDE HYDROCHLORIDE 1 DROP: 20 SOLUTION/ DROPS OPHTHALMIC at 08:20

## 2025-05-01 RX ADMIN — BUDESONIDE 3 MG: 3 CAPSULE ORAL at 08:20

## 2025-05-01 RX ADMIN — DORZOLAMIDE HYDROCHLORIDE 1 DROP: 20 SOLUTION/ DROPS OPHTHALMIC at 19:38

## 2025-05-01 RX ADMIN — LEVETIRACETAM 500 MG: 500 TABLET, FILM COATED ORAL at 06:39

## 2025-05-01 RX ADMIN — ATORVASTATIN CALCIUM 10 MG: 10 TABLET, FILM COATED ORAL at 08:20

## 2025-05-01 RX ADMIN — TOPIRAMATE 25 MG: 25 TABLET, FILM COATED ORAL at 08:20

## 2025-05-01 RX ADMIN — SODIUM CHLORIDE, PRESERVATIVE FREE 10 ML: 5 INJECTION INTRAVENOUS at 08:21

## 2025-05-01 ASSESSMENT — PAIN SCALES - GENERAL
PAINLEVEL_OUTOF10: 0
PAINLEVEL_OUTOF10: 0

## 2025-05-01 NOTE — PROGRESS NOTES
[facility]  SPEECH THERAPY  [facility] 4A  Speech - Language - Cognitive Evaluation + Clinical Swallow Evaluation + Cognitive Therapy    Discharge Recommendations: Inpatient Rehabilitation and Home Health  DIET ORDER RECOMMENDATIONS AFTER EVALUATION: Regular diet, thin liquids   STRATEGIES: Full Upright Position, Small Bite/Sip, Pulmonary Monitoring, and Alternate Solids and Liquids     SLP Individual Minutes  Time In: 0800  Time Out: 0830  Minutes: 30  Timed Code Treatment Minutes: 8 Minutes     Speech, Language, Cognitive Evaluation: 14 minutes  Clinical Swallow Evaluation: 8 minutes  Cognitive Therapy: 8 minutes    Date: 2025  Patient Name: Nicci Mooney      CSN: [id number]   : 1950  (74 y.o.)  Gender: female   Referring Physician:   Nav Womack DO  Diagnosis: SAH (subarachnoid hemorrhage) (HCC)  Precautions: Fall risk   History of Present Illness/Injury: Patient admitted to [facility] for above dx. Per chart review, \"74F PMHx hypertension, hyperlipidemia presenting s/p questionable syncopal with fall and head strike to posterior aspect of scalp approximately 36-48 hours ago.  Patient is not a reliable historian as she has vague memory of event however she is A/OX4 and GCS 15 at this time.  CT on arrival shows acute subarachnoid hemorrhages bilaterally with a large left scalp hematoma.  Repeat head CT this morning shows small acute hemorrhages in the bilateral ventricles.  Trauma as primary, NSG on board. Both notified about findings.  Patient maintaining SBP goal <160, hypertonic saline goal sodium 145-150, and seizure precautions Keppra 500 Mg twice daily. Assessment s/p trauma and neurosurgery evaluation--> transfer to floor.\"    CT Head   IMPRESSION:     1. Stable areas of patchy subarachnoid hemorrhage at the vertex overlying the  frontal and parietal lobes.  2. Interval development of a thin low attenuation left-sided subdural collection  measuring 4 mm.  3. Thin  accuracy given min cues to improve mental flexibility for home scenarios.  Goal 4: Patient will complete structured attention tasks with no more than 3 errors until task completion to permit potential return to multi-tasking, IADLs, driving, and occupational hobbies.  Goal 5: Patient/family/staff will adhere to low stimulation guidelines/protocol (ACE completion) to assist with neurological recovery s/t pertinent intracranial findings.    Cognitive Therapy:  Acute Concussion Evaluation (ACE):   Physical Symptoms: 7/10  Cognitive Symptoms: 4/4  Emotional Symptoms: 0/4  Sleep Symptoms: 1/4     Acute Concussion Evaluation (ACE) completed this date following admission with documented concern for CHI. Cognitive linguistic evaluation was completed with score of 25/30 derived, indicating a mild impairment level. ACE score of 12/22 calculated; It should be noted that a score with concerns for concussion are greater than 12/22.     Low Stimulation Environment Guidelines:  1. Keep lights dim or limit number of lights on at one time.   2. Keep door to the room closed.   3. Encourage and allow frequent rest breaks.   4. Limit the amount of time the television or radio is turned on; turn off the TV when visitors are present.   5. Limit visitors in the room; no more than 2 at a time.   6. Always identify yourself when entering the room.   8. Consider the amount of visual stimulation (number of pictures, banners, colors, etc).   *REMEMBER, the brain is working when it is processing information of any kind.       LONG TERM GOALS:  No LTGs established due to short ELOS.       Allyson Prater M.A., CCC-SLP [id number]

## 2025-05-01 NOTE — PROGRESS NOTES
[place], [place]                                          NEUROSURGICAL PROGRESS NOTE       Nicci Mooney   YOB: 1950  Account Number: [id number]   Date of Examination: 5/1/2025    CT head completed today demonstrates continued stability with low attenuation of subdural collection and subarachnoid hemorrhage in which she will continue to follow-up as indicated with outpatient repeat head CT in 1 week.  Stable from neurosurgery standpoint for discharge.      There is a send left-sided low-attenuation subdural collection measuring 4 mm.  There is small amount of blood which is symmetric tracking along the 2 arterial bilaterally there is no associated mass effect with evidence of chronic small vessel ischemic changes      Head CT wo contrast 5/1/2025                               Case and plan discussed ULISSES Nielsen and with SONYA Mendez - CNP  Electronically signed 5/1/2025 at 6:25 PM

## 2025-05-01 NOTE — PROGRESS NOTES
[facility]  Trauma Surgery -   Geriatric Protocol: Comprehensive Assessment Note      Identification of Seniors At Risk- (ISAR-R):    Screening Question No (0) Yes (+1)   1) Before the illness or injury that brought you to the hospital, did you need someone to help you on a regular basis? [x]  []    2) In the last 24 hours, have you needed more help than usual? []  [x]    3) Have you been hospitalized for one or more nights during the past six months? [x]  []    4) In general, do you have serious problems with your vision that cannot be corrected with glasses? [x]  []    5) In general, do you have serious problems with your memory? [x]  []    6) Do you take six or more different medications every day? []  [x]      Total Score:_2/6_  Score >2 is considered positive. If negative, assessment is complete.    Conversation with caregiver: daughter.     Advance Care Planning  Deferred to Spiritual Care [x]     Healthcare Power ofAttorney:   Financial Power of :   Living Will:  Code Status:       Allergies   Allergen Reactions    Keflex [Cephalexin] Hives    Theophyllines Nausea And Vomiting     Current Facility-Administered Medications: atorvastatin (LIPITOR) tablet 10 mg, 10 mg, Oral, Daily  hydrALAZINE (APRESOLINE) injection 5 mg, 5 mg, IntraVENous, Q6H PRN  dorzolamide (TRUSOPT) 2 % ophthalmic solution 1 drop, 1 drop, Both Eyes, TID  budesonide (ENTOCORT EC) delayed release capsule 3 mg, 3 mg, Oral, Daily  fluticasone (FLONASE) 50 MCG/ACT nasal spray 2 spray, 2 spray, Each Nostril, Daily PRN  metoprolol succinate (TOPROL XL) extended release tablet 100 mg, 100 mg, Oral, Daily  montelukast (SINGULAIR) tablet 10 mg, 10 mg, Oral, Nightly  Netarsudil Dimesylate (RHOPRESSA) 0.02 % ophthalmic solution SOLN 1 drop (Patient Supplied), 1 drop, Ophthalmic, Daily  topiramate (TOPAMAX) tablet 25 mg, 25 mg, Oral, BID  sodium chloride flush 0.9 % injection 5-40 mL, 5-40 mL, IntraVENous, 2 times per day  sodium

## 2025-05-01 NOTE — PROGRESS NOTES
[facility]  Trauma Surgery - Dr. Rupert Cabrera  Daily Progress Note  Pt Name: Nicci Mooney  Medical Record Number: [id number]  Date of Birth 1950   Today's Date: 5/1/2025    HD: # 2    CC: Headache, improving     ASSESSMENT  1.  Active Hospital Problems    Diagnosis Date Noted    SDH (subdural hematoma) (HCC) [S06.5XAA] 04/30/2025    Intraventricular hemorrhage (HCC) [I61.5] 04/30/2025    Traumatic subarachnoid hematoma with loss of consciousness (HCC) [S06.6X9A] 04/30/2025    Syncope and collapse [R55] 04/29/2025    SAH (subarachnoid hemorrhage) (HCC) [I60.9] 04/29/2025    Laceration of occipital region of scalp without complication [S01.01XA] 04/29/2025    Hematoma of occipital region of scalp [S00.03XA] 04/29/2025         PLAN  Admitted to ICU under Trauma Services   - 04/30: Transfer to Step-down    Trauma by fall   - Fall precautions   - PT, OT continue to treat    Subarachnoid hemorrhage   - CT head on arrival indicates bilateral areas of subarachnoid hemorrhages layering the tentorium   - Consult neurosurgery    - Neuro checks per unit routine   - Maintain systolic blood pressure between 100-160   - Trauma dose TXA given    - Elevate head of bed approximately 30 degrees   - Hold all anticoagulant and antiplatelet medications   - Repeat head CT in AM   - Hypertonic saline to keep Na+ 145-150   - Seizure precautions, Keppra load 1 G then 500 mg BID   - 04/30: Repeat head CT this AM notes acute subarachnoid hemorrhages in the right frontal and bilateral parietal lobes.  There is small acute subdural blood along the tentorium bilaterally.  There is small acute blood layering in the atria of bilateral lateral ventricles. Hypertonic saline stopped.  Neurosurgery recommends no intervention at this time.  Patient can be discharged from neurosurgery perspective once cleared by other services.  Repeat CT head in 1 week with follow up to PCP.  Continue Keppra for 7 days.  May resume

## 2025-05-01 NOTE — PROGRESS NOTES
[facility]  INPATIENT PHYSICAL THERAPY  EVALUATION  [facility] 4A - 4A-03/003-A    Discharge Recommendations: Continue to assess pending progress, Patient would benefit from continued therapy after discharge  Equipment Recommendations: No               Time In: 0742  Time Out: 0758  Timed Code Treatment Minutes: 8 Minutes  Minutes: 16          Date: 2025  Patient Name: Nicci Mooney,  Gender:  female        MRN: [id number]  : 1950  (74 y.o.)      Referring Practitioner: Dr. TETE Womack  Diagnosis: SAH (subarachnoid hemorrhage) ([facility])  Additional Pertinent Hx: 74F PMHx hypertension, hyperlipidemia presenting s/p questionable syncopal with fall and head strike to posterior aspect of scalp approximately 36-48 hours ago.  Patient is not a reliable historian as she has vague memory of event however she is A/OX4 and GCS 15 at this time.  CT on arrival shows acute subarachnoid hemorrhages bilaterally with a large left scalp hematoma.  Repeat head CT this morning shows small acute hemorrhages in the bilateral ventricles. Neurosurgery recommends no need for surgical management at this time.     Restrictions/Precautions:  Restrictions/Precautions: Fall Risk                   Subjective:  Chart Reviewed: Yes  Patient assessed for rehabilitation services?: Yes  Subjective: pleasant and cooperative, pt stated her dtr from [place] is here and staying with her as long as needed    General:        Hearing: Within functional limits       Pain: no pain per pt    Vitals: Orthostatic Blood Pressure: SUPINE: Blood Pressure: 136/65, Heart Rate: 70  SITTING: Blood Pressure: 126/77, Heart Rate: 81  STANDING: Blood Pressure: 123/72, Heart Rate: 97  Pt on RA with O2 sat at 97%  Social/Functional History:    Lives With: Alone  Type of Home: House  Home Layout: Two level, Work area in basement, Bed/Bath upstairs, 1/2 bath on main level  Home Access: Stairs to enter with rails  Entrance Stairs - Number of  Comments: 24 hr S/A for safety  Education Method: Verbal  Barriers to Learning: None  Education Outcome: Verbalized understanding       Plan:  Current Treatment Recommendations: Strengthening, Balance training, Endurance training, Functional mobility training, Transfer training, Gait training, Stair training, Home exercise program, Therapeutic activities, Patient/Caregiver education & training, Safety education & training, Equipment evaluation, education, & procurement  General Plan:  (5-6X C)    Goals:  Patient Goals : go home  Short Term Goals  Time Frame for Short Term Goals: by discharge  Short Term Goal 1: bed mobility with MOD I to get in/out of bed  Short Term Goal 2: transfer with MOD I to get in/out of chairs  Short Term Goal 3: amb >75'x1 without AD and MOD I to walk safely in home  Short Term Goal 4: negotiate 13 steps with HR and MOD I to enter home safely  Long Term Goals  Time Frame for Long Term Goals : no LTGs set secondary to short ELOS    Following session, patient left in safe position with all fall risk precautions in place.

## 2025-05-01 NOTE — PROGRESS NOTES
[facility]  STR NEUROSCIENCES 4A  Occupational Therapy  Daily Note    Discharge Recommendations: Inpatient Rehabilitation  Equipment Recommendations: No         Time In: 1125  Time Out: 1148  Timed Code Treatment Minutes: 23 Minutes  Minutes: 23          Date: 2025  Patient Name: Nicci Mooney,   Gender: female      Room: [facility]03/003-A  MRN: [id number]  : 1950  (74 y.o.)  Referring Practitioner: Nav Womack DO  Diagnosis: SAH (subarachnoid hemorrhage) (Formerly [facility])  Additional Pertinent Hx: 74F PMHx hypertension, hyperlipidemia presenting s/p questionable syncopal with fall and head strike to posterior aspect of scalp approximately 36-48 hours ago.  Patient is not a reliable historian as she has vague memory of event however she is A/OX4 and GCS 15 at this time.  CT on arrival shows acute subarachnoid hemorrhages bilaterally with a large left scalp hematoma.  Repeat head CT this morning shows small acute hemorrhages in the bilateral ventricles. Neurosurgery recommends no need for surgical management at this time.    Restrictions/Precautions:  Restrictions/Precautions: Fall Risk      Social/Functional History:  Lives With: Alone  Type of Home: House  Home Layout: Two level, Work area in basement, Bed/Bath upstairs, 1/2 bath on main level  Home Access: Stairs to enter with rails  Entrance Stairs - Number of Steps: 2 JUAN RAMON home;  ~13 steps to basement where she keeps her cat supplies; ~13 steps to upstairs where her bed and shower are.  Home Equipment: Walker - Rolling, Cane   Bathroom Shower/Tub: Walk-in shower  Bathroom Toilet: Standard  Bathroom Equipment: Built-in shower seat    Receives Help From:  (typically needs no help)  Prior Level of Assist for ADLs: Independent  Prior Level of Assist for Homemaking: Independent  Homemaking Responsibilities: Yes  Prior Level of Assist for Transfers: Independent       Active : Yes  Occupation: Retired  Additional Comments: Typically very indep  Frame for Short Term Goals: until discharge  Short Term Goal 1: Pt will navigate to/from bathroom and community distances utilizing LRAD with MI and good awareness for safety to improve indep access with ADLs.  Short Term Goal 2: Pt will complete UB/LB dressing/bathing with MI to improve indep with ADL routines.  Short Term Goal 3: Pt will tolerate x5-8 minutes standing with MI to improve standing balance and activity tolerance required for sinkside I/ADLs.  Short Term Goal 4: Pt will gather x5 items from various surface heights wiht MI and good awareness for safety when reaching to improve indep with ADL prep adn clean up and reduce risk for falls.    Following session, patient left in safe position with all fall risk precautions in place.

## 2025-05-01 NOTE — PLAN OF CARE
Problem: Discharge Planning  Goal: Discharge to home or other facility with appropriate resources  Outcome: Progressing  Flowsheets (Taken 5/1/2025 0718)  Discharge to home or other facility with appropriate resources:   Identify barriers to discharge with patient and caregiver   Identify discharge learning needs (meds, wound care, etc)   Refer to discharge planning if patient needs post-hospital services based on physician order or complex needs related to functional status, cognitive ability or social support system   Arrange for needed discharge resources and transportation as appropriate     Problem: Pain  Goal: Verbalizes/displays adequate comfort level or baseline comfort level  Outcome: Progressing  Flowsheets (Taken 5/1/2025 0718)  Verbalizes/displays adequate comfort level or baseline comfort level:   Encourage patient to monitor pain and request assistance   Assess pain using appropriate pain scale   Administer analgesics based on type and severity of pain and evaluate response   Consider cultural and social influences on pain and pain management   Implement non-pharmacological measures as appropriate and evaluate response     Problem: Safety - Adult  Goal: Free from fall injury  Outcome: Progressing  Flowsheets (Taken 5/1/2025 0718)  Free From Fall Injury: Instruct family/caregiver on patient safety     Problem: Nutrition Deficit:  Goal: Optimize nutritional status  Outcome: Progressing  Flowsheets (Taken 5/1/2025 0718)  Nutrient intake appropriate for improving, restoring, or maintaining nutritional needs: Assess nutritional status and recommend course of action

## 2025-05-02 ENCOUNTER — APPOINTMENT (OUTPATIENT)
Age: 75
End: 2025-05-02
Attending: INTERNAL MEDICINE
Payer: MEDICARE

## 2025-05-02 ENCOUNTER — APPOINTMENT (OUTPATIENT)
Dept: CT IMAGING | Age: 75
End: 2025-05-02
Payer: MEDICARE

## 2025-05-02 VITALS
DIASTOLIC BLOOD PRESSURE: 56 MMHG | WEIGHT: 115.3 LBS | HEIGHT: 62 IN | OXYGEN SATURATION: 96 % | HEART RATE: 60 BPM | RESPIRATION RATE: 18 BRPM | TEMPERATURE: 97.9 F | BODY MASS INDEX: 21.22 KG/M2 | SYSTOLIC BLOOD PRESSURE: 115 MMHG

## 2025-05-02 LAB
ANION GAP SERPL CALC-SCNC: 12 MEQ/L (ref 8–16)
BUN SERPL-MCNC: 17 MG/DL (ref 8–23)
CALCIUM SERPL-MCNC: 9.9 MG/DL (ref 8.8–10.2)
CHLORIDE SERPL-SCNC: 105 MEQ/L (ref 98–111)
CO2 SERPL-SCNC: 21 MEQ/L (ref 22–29)
CREAT SERPL-MCNC: 0.8 MG/DL (ref 0.5–0.9)
DEPRECATED RDW RBC AUTO: 40.6 FL (ref 35–45)
ECHO BSA: 1.51 M2
ERYTHROCYTE [DISTWIDTH] IN BLOOD BY AUTOMATED COUNT: 12 % (ref 11.5–14.5)
GFR SERPL CREATININE-BSD FRML MDRD: 76 ML/MIN/1.73M2
GLUCOSE SERPL-MCNC: 114 MG/DL (ref 74–109)
HCT VFR BLD AUTO: 37.7 % (ref 37–47)
HGB BLD-MCNC: 13.7 GM/DL (ref 12–16)
MCH RBC QN AUTO: 33.7 PG (ref 26–33)
MCHC RBC AUTO-ENTMCNC: 36.3 GM/DL (ref 32.2–35.5)
MCV RBC AUTO: 92.6 FL (ref 81–99)
PLATELET # BLD AUTO: 228 THOU/MM3 (ref 130–400)
PMV BLD AUTO: 9.7 FL (ref 9.4–12.4)
POTASSIUM SERPL-SCNC: 3.5 MEQ/L (ref 3.5–5.2)
RBC # BLD AUTO: 4.07 MILL/MM3 (ref 4.2–5.4)
SODIUM SERPL-SCNC: 138 MEQ/L (ref 135–145)
WBC # BLD AUTO: 9.3 THOU/MM3 (ref 4.8–10.8)

## 2025-05-02 PROCEDURE — 97130 THER IVNTJ EA ADDL 15 MIN: CPT | Performed by: SPEECH-LANGUAGE PATHOLOGIST

## 2025-05-02 PROCEDURE — 80048 BASIC METABOLIC PNL TOTAL CA: CPT

## 2025-05-02 PROCEDURE — 99239 HOSP IP/OBS DSCHRG MGMT >30: CPT | Performed by: SURGERY

## 2025-05-02 PROCEDURE — 93270 REMOTE 30 DAY ECG REV/REPORT: CPT

## 2025-05-02 PROCEDURE — 2500000003 HC RX 250 WO HCPCS: Performed by: SURGERY

## 2025-05-02 PROCEDURE — 70450 CT HEAD/BRAIN W/O DYE: CPT

## 2025-05-02 PROCEDURE — 99222 1ST HOSP IP/OBS MODERATE 55: CPT | Performed by: INTERNAL MEDICINE

## 2025-05-02 PROCEDURE — 97535 SELF CARE MNGMENT TRAINING: CPT

## 2025-05-02 PROCEDURE — 6370000000 HC RX 637 (ALT 250 FOR IP)

## 2025-05-02 PROCEDURE — 97129 THER IVNTJ 1ST 15 MIN: CPT | Performed by: SPEECH-LANGUAGE PATHOLOGIST

## 2025-05-02 PROCEDURE — 6370000000 HC RX 637 (ALT 250 FOR IP): Performed by: SURGERY

## 2025-05-02 PROCEDURE — 36415 COLL VENOUS BLD VENIPUNCTURE: CPT

## 2025-05-02 PROCEDURE — 97530 THERAPEUTIC ACTIVITIES: CPT

## 2025-05-02 PROCEDURE — 97116 GAIT TRAINING THERAPY: CPT

## 2025-05-02 PROCEDURE — 97112 NEUROMUSCULAR REEDUCATION: CPT

## 2025-05-02 PROCEDURE — 85027 COMPLETE CBC AUTOMATED: CPT

## 2025-05-02 PROCEDURE — 97110 THERAPEUTIC EXERCISES: CPT

## 2025-05-02 RX ORDER — METOPROLOL SUCCINATE 100 MG/1
TABLET, EXTENDED RELEASE ORAL
Qty: 90 TABLET | Refills: 1 | Status: SHIPPED | OUTPATIENT
Start: 2025-05-02

## 2025-05-02 RX ORDER — LEVETIRACETAM 500 MG/1
500 TABLET ORAL EVERY 12 HOURS
Qty: 9 TABLET | Refills: 0 | Status: SHIPPED | OUTPATIENT
Start: 2025-05-02 | End: 2025-05-07

## 2025-05-02 RX ADMIN — SODIUM CHLORIDE, PRESERVATIVE FREE 10 ML: 5 INJECTION INTRAVENOUS at 07:48

## 2025-05-02 RX ADMIN — DORZOLAMIDE HYDROCHLORIDE 1 DROP: 20 SOLUTION/ DROPS OPHTHALMIC at 14:38

## 2025-05-02 RX ADMIN — TOPIRAMATE 25 MG: 25 TABLET, FILM COATED ORAL at 07:48

## 2025-05-02 RX ADMIN — DORZOLAMIDE HYDROCHLORIDE 1 DROP: 20 SOLUTION/ DROPS OPHTHALMIC at 07:48

## 2025-05-02 RX ADMIN — ACETAMINOPHEN 650 MG: 325 TABLET ORAL at 04:29

## 2025-05-02 RX ADMIN — LEVETIRACETAM 500 MG: 500 TABLET, FILM COATED ORAL at 07:48

## 2025-05-02 RX ADMIN — ATORVASTATIN CALCIUM 10 MG: 10 TABLET, FILM COATED ORAL at 07:48

## 2025-05-02 RX ADMIN — BUDESONIDE 3 MG: 3 CAPSULE ORAL at 07:48

## 2025-05-02 RX ADMIN — BACITRACIN: 500 OINTMENT TOPICAL at 07:48

## 2025-05-02 RX ADMIN — HYDROCHLOROTHIAZIDE 12.5 MG: 25 TABLET ORAL at 07:48

## 2025-05-02 RX ADMIN — LISINOPRIL 20 MG: 20 TABLET ORAL at 07:48

## 2025-05-02 RX ADMIN — LEVETIRACETAM 500 MG: 500 TABLET, FILM COATED ORAL at 19:56

## 2025-05-02 ASSESSMENT — PAIN - FUNCTIONAL ASSESSMENT: PAIN_FUNCTIONAL_ASSESSMENT: ACTIVITIES ARE NOT PREVENTED

## 2025-05-02 ASSESSMENT — PAIN DESCRIPTION - ORIENTATION: ORIENTATION: POSTERIOR

## 2025-05-02 ASSESSMENT — PAIN DESCRIPTION - DESCRIPTORS: DESCRIPTORS: ACHING

## 2025-05-02 ASSESSMENT — PAIN DESCRIPTION - LOCATION: LOCATION: HEAD

## 2025-05-02 ASSESSMENT — PAIN SCALES - GENERAL: PAINLEVEL_OUTOF10: 2

## 2025-05-02 NOTE — PROGRESS NOTES
[facility] [place]  Trauma Surgery - Dr. Rupert Cabrera  Daily Progress Note  Pt Name: Nicci Mooney  Medical Record Number: [id number]  Date of Birth 1950   Today's Date: 5/2/2025    HD: # 3    CC: Fall, Headache, improving     ASSESSMENT  1.  Active Hospital Problems    Diagnosis Date Noted    SDH (subdural hematoma) (HCC) [S06.5XAA] 04/30/2025    Intraventricular hemorrhage (HCC) [I61.5] 04/30/2025    Traumatic subarachnoid hematoma with loss of consciousness (HCC) [S06.6X9A] 04/30/2025    Syncope and collapse [R55] 04/29/2025    SAH (subarachnoid hemorrhage) (HCC) [I60.9] 04/29/2025    Laceration of occipital region of scalp without complication [S01.01XA] 04/29/2025    Hematoma of occipital region of scalp [S00.03XA] 04/29/2025         PLAN  Admitted to ICU under Trauma Services   - 04/30: Transfer to Step-down    Trauma by fall   - Fall precautions   - PT, OT continue to treat    Subarachnoid hemorrhage   - CT head on arrival indicates bilateral areas of subarachnoid hemorrhages layering the tentorium   - Consult neurosurgery    - Neuro checks per unit routine   - Maintain systolic blood pressure between 100-160   - Trauma dose TXA given    - Elevate head of bed approximately 30 degrees   - Hold all anticoagulant and antiplatelet medications   - Repeat head CT in AM   - Hypertonic saline to keep Na+ 145-150   - Seizure precautions, Keppra load 1 G then 500 mg BID   - 04/30: Repeat head CT this AM notes acute subarachnoid hemorrhages in the right frontal and bilateral parietal lobes.  There is small acute subdural blood along the tentorium bilaterally.  There is small acute blood layering in the atria of bilateral lateral ventricles. Hypertonic saline stopped.  Neurosurgery recommends no intervention at this time.  Patient can be discharged from neurosurgery perspective once cleared by other services.  Repeat CT head in 1 week with follow up to PCP.  Continue Keppra for 7 days.  May resume  appropriate to reduce radiation to a low as reasonably achievable.    CT CERVICAL SPINE WO CONTRAST  Result Date: 4/29/2025  PROCEDURE: CT CERVICAL SPINE WO CONTRAST CLINICAL INFORMATION: Neck pain, fall TECHNIQUE: CT of the cervical spine was performed without the use of intravenous contrast. Axial images as well as coronal and sagittal reconstructions were obtained. All CT scans at this facility use dose modulation, iterative reconstruction, and/or weight-based dosing when appropriate to reduce radiation dose to as low as reasonably achievable. COMPARISON: None FINDINGS: Cervical vertebral body heights and alignment are preserved. There is disc space narrowing and osteophyte formation at the C4-C5, C5-C6 and C6-C7 levels. There is no fracture or spondylolisthesis. The atlantodental interval is normal. Neuroforaminal narrowing is present at the bilateral C4-C5, C5-C6 and C6/C7 levels. There is mild central canal stenosis at these levels. There is scarring at the bilateral lung apices. There is a calcified granuloma in the right upper lung.     1. No fracture or spondylolisthesis of the cervical spine. 2. Multilevel degenerative disc disease, neuroforaminal narrowing and central canal stenosis. **This report has been created using voice recognition software. It may contain minor errors which are inherent in voice recognition technology.** Electronically signed by Dr. Subhash Van    XR CHEST (2 VW)  Result Date: 4/29/2025  PROCEDURE: XR CHEST (2 VW) CLINICAL INFORMATION: Loss of consciousness TECHNIQUE: AP and lateral chest radiographs COMPARISON: AP and lateral chest radiographs 5/26/2024 FINDINGS: Cardiomediastinal silhouette is within normal limits. Lungs are clear. Endplate changes are present in the thoracic spine. Soft tissues are unremarkable.     No acute intrathoracic process. **This report has been created using voice recognition software. It may contain minor errors which are inherent in voice

## 2025-05-02 NOTE — PLAN OF CARE
Problem: Discharge Planning  Goal: Discharge to home or other facility with appropriate resources  Outcome: Adequate for Discharge     Problem: Pain  Goal: Verbalizes/displays adequate comfort level or baseline comfort level  Outcome: Adequate for Discharge     Problem: Safety - Adult  Goal: Free from fall injury  Outcome: Adequate for Discharge     Problem: Nutrition Deficit:  Goal: Optimize nutritional status  Outcome: Adequate for Discharge

## 2025-05-02 NOTE — CARE COORDINATION
5/2/25, 11:44 AM EDT    DISCHARGE ON GOING EVALUATION    Nicci WILLIAM [facility] day: 3  Location: 4A-03/003-A Reason for admit: SAH (subarachnoid hemorrhage) (HCC) [I60.9]     Procedures:   4/30 ECHO EF 55-60%    Imaging since last note:   5/1 CT Head WO Contrast 1. Stable areas of patchy subarachnoid hemorrhage at the vertex overlying the   frontal and parietal lobes.   2. Interval development of a thin low attenuation left-sided subdural collection   measuring 4 mm.   3. Thin subdural hematoma along the tentorium.   4. No significant mass effect.     5/2 CT Head WO Contrast 1. Stable areas of subarachnoid hemorrhage at the vertex overlying the frontal   and parietal lobes.   2. Stable thin low attenuation left-sided subdural collection measuring 4 mm.   3. Stable thin subdural hematoma along the tentorium.   4. No significant mass effect.     Barriers to Discharge: PT/OT/SLP, Hospitalist consult, pain and nausea control, electrolyte replacement protocols.     PCP: Kristie Celaya APRN - CNP  Readmission Risk Score: 10.5    Patient Goals/Plan/Treatment Preferences: Plan is home with daughter staying with pt. Will follow.

## 2025-05-02 NOTE — PROGRESS NOTES
Discuss at follow-up      Reviewed AVS and prescriptions with pt and her daughter at bedside. Also reviewed orders for CT scan and carotid duplex and given rec forms with numbers for scheduling. Pt and daughter verbalized understanding. PIVs removed. Pt awaiting event monitor to be placed prior to discharge.

## 2025-05-02 NOTE — CONSULTS
Hospitalist Consult Note        Patient:  Nicci Mooney  YOB: 1950  Date of Service: 2025  MRN: [id number]   Acct:  [id number]   Primary Care Physician: Kristie Celaya APRN - CNP    Chief Complaint:  possible syncope and fall at home .    Reason for consult  due to HTN, HLD    Date of Service: Pt seen/examined in consultation on 2025     History Of Present Illness:      Nicci Mooney74 y.o. female who we are asked to see/evaluate by Rupert Cabrera MD for medical management of HTN and HLD. She does have hx of collagenous colitis, and bronchial asthma on long term steroids had a fall at home admitted by trauma team , initially in ICU and then transferred out to SD. Had SDH and SAH seen by neurosurgery service .   Currently she feels well, eating well, no CP or SOB. Daughter at bed side and she is interested in going home, no visual disturbances . Denies hx of CAD or frequent falls, per daughter gets around well, and functionally independent with her ADL.   She lives alone as her   about 1.5 yrs ago. Currently no HA , tolerating PO intake well.  Noted echo done while in ICU, normal EF.       Assessment and Plan:-    S/P fall with possible syncope  SDH and SAH  Essential HTN  HLD  Collagenous colitis  Bronchial asthma     From a medical stand point of view ok with discharge, but its upto trauma , the primary service. I would cut down the toprol to 1/2 of 100 mg daily , as BP is soft and recommend that in the evening, and ACE in am .  Obtain a carotid u/s and event monitor for 2 weeks and she can follow up with her PCP. Noted and reviewed the recent echo with them .  I spoke with the nursing team re my recommendations and pt and family today .        Past Medical History:        Diagnosis Date    Asthma     Chronic back pain     Chronic kidney disease     cysts on her lt kidney    Colitis     GERD (gastroesophageal reflux disease)     Dr. Bety Rich    Glaucoma

## 2025-05-02 NOTE — PROGRESS NOTES
Pharmacy Consult      Nicci Mooney is a 74 y.o. female for whom pharmacy has been consulted to evaluate patient's medication list for Beer's criteria.     Allergies:  Keflex [cephalexin] and Theophyllines     Recent Labs     05/01/25  0400 05/02/25  0455   CREATININE 0.8 0.8     Ht/Wt:   Ht Readings from Last 1 Encounters:   04/29/25 1.575 m (5' 2\")        Wt Readings from Last 1 Encounters:   05/02/25 52.3 kg (115 lb 4.8 oz)       Estimated Creatinine Clearance: 47 mL/min (based on SCr of 0.8 mg/dL).    Assessment/Plan:  Patient is on Keppra and Compazine, both of which are on the Beer's list of medications.  Keppra dose is appropriate for patient's kidney function.  Compazine is used PRN    Thank you for the consult.  Will continue to follow.

## 2025-05-02 NOTE — PROGRESS NOTES
[facility]  STRZ NEUROSCIENCES 4A  Occupational Therapy  Daily Note    Discharge Recommendations: Home with assist as needed and HH vs OP OT pending pt preference  **Pt reported her daughter will be staying with her at time of discharge, and able to stay with pt as long as she needs.    Equipment Recommendations: No      Time In: 09  Time Out: 09  Timed Code Treatment Minutes: 23 Minutes  Minutes: 23          Date: 2025  Patient Name: Nicci Mooney,   Gender: female      Room: [facility]  MRN: [id number]  : 1950  (74 y.o.)  Referring Practitioner: Nav Womack DO  Diagnosis: SAH (subarachnoid hemorrhage) (HCC)  Additional Pertinent Hx: 74F PMHx hypertension, hyperlipidemia presenting s/p questionable syncopal with fall and head strike to posterior aspect of scalp approximately 36-48 hours ago.  Patient is not a reliable historian as she has vague memory of event however she is A/OX4 and GCS 15 at this time.  CT on arrival shows acute subarachnoid hemorrhages bilaterally with a large left scalp hematoma.  Repeat head CT this morning shows small acute hemorrhages in the bilateral ventricles. Neurosurgery recommends no need for surgical management at this time.    Restrictions/Precautions:  Restrictions/Precautions: Fall Risk     Social/Functional History:  Lives With: Alone  Type of Home: House  Home Layout: Two level, Work area in basement, Bed/Bath upstairs, 1/2 bath on main level  Home Access: Stairs to enter with rails  Entrance Stairs - Number of Steps: 2 JUAN RAMON home;  ~13 steps to basement where she keeps her cat supplies; ~13 steps to upstairs where her bed and shower are.  Home Equipment: Walker - Rolling, Cane   Bathroom Shower/Tub: Walk-in shower  Bathroom Toilet: Standard  Bathroom Equipment: Built-in shower seat    Receives Help From:  (typically needs no help)  Prior Level of Assist for ADLs: Independent  Prior Level of Assist for Homemaking: Independent  Homemaking

## 2025-05-02 NOTE — PROGRESS NOTES
Evaluation (ACE) completed this date following admission with documented concern for CHI. Cognitive linguistic evaluation was completed with score of 25/30 derived, indicating a mild impairment level. ACE score of 9/22 calculated; It should be noted that a score with concerns for concussion are greater than 12/22.        Completed further education on the following low stimulation guidelines and rationale for them:    Low Stimulation Environment Guidelines:  1. Keep lights dim or limit number of lights on at one time.   2. Keep door to the room closed.   3. Encourage and allow frequent rest breaks.   4. Limit the amount of time the television or radio is turned on; turn off the TV when visitors are present.   5. Limit visitors in the room; no more than 2 at a time.   6. Always identify yourself when entering the room.   8. Consider the amount of visual stimulation (number of pictures, banners, colors, etc).   *REMEMBER, the brain is working when it is processing information of any kind.     Patient reports she has been on her cell phone for very limited time.  No TV on and the patient's door was closed upon arrival for this session.        Long-Term Goals:   No LTG's secondary to short estimated length of stay.    Functional Oral Intake Scale: Total Oral Intake: 7.  Total oral intake with no restrictions    EDUCATION:  Learner: Patient  Education:  Reviewed ST goals and Plan of Care, Reviewed recommendations for follow-up, and memory strategies, low stimulation guidelines  Evaluation of Education: Verbalizes understanding, Needs further instruction, and Family not present    ASSESSMENT/PLAN:  Activity Tolerance:  Patient tolerance of  treatment: good. Excellent participation.     Assessment/Plan: Patient progressing toward established goals.  Continues to require skilled care of licensed speech pathologist to progress toward achievement of established goals and plan of care..     Plan for Next Session: cognitive  therapy      Alma Rosa Ma M.S. CCC-SLP [phone]

## 2025-05-02 NOTE — DISCHARGE SUMMARY
HOSPITALIST  IP CONSULT TO HOSPITALIST    Surgeries/procedures Performed:  None     Treatments:    Analgesia    Acetaminophen    Discharge Plan/Disposition:  Home    Hospital/Incidental Findings Requiring Follow Up:    Patient Instructions:    Diet: Regular Diet and Encourage Fluids    Activity:No Lifting, Driving or Strenuous Excercise, No Heavy Lifting and No Driving for 3 Weeks  For number of days (if applicable): 14      Other Instructions: No driving or strenuous activity until cleared from Neurosurgery.    Provider Follow-Up:   No follow-ups on file.     Significant Diagnostic Studies:    Recent Labs:  Admission on 04/29/2025  WBC                                           Date: 04/29/2025  Value: 8.7         Ref range: 4.8 - 10.8 thou/*  Status: Final  RBC                                           Date: 04/29/2025  Value: 4.14 (L)    Ref range: 4.20 - 5.40 mill*  Status: Final  Hemoglobin                                    Date: 04/29/2025  Value: 13.9        Ref range: 12.0 - 16.0 gm/dl  Status: Final  Hematocrit                                    Date: 04/29/2025  Value: 39.4        Ref range: 37.0 - 47.0 %      Status: Final  MCV                                           Date: 04/29/2025  Value: 95.2        Ref range: 81.0 - 99.0 fL     Status: Final  MCH                                           Date: 04/29/2025  Value: 33.6 (H)    Ref range: 26.0 - 33.0 pg     Status: Final  MCHC                                          Date: 04/29/2025  Value: 35.3        Ref range: 32.2 - 35.5 gm/dl  Status: Final  RDW-CV                                        Date: 04/29/2025  Value: 12.6        Ref range: 11.5 - 14.5 %      Status: Final  RDW-SD                                        Date: 04/29/2025  Value: 43.2        Ref range: 35.0 - 45.0 fL     Status: Final  Platelets                                     Date: 04/29/2025  Value: 229         Ref range: 130 - 400 thou/m*  Status: Final  MPV                           created using voice recognition software. It may contain minor errors which are inherent in voice recognition technology.** Electronically signed by Dr. Nydia Joe    CT HEAD WO CONTRAST  Result Date: 4/30/2025  Small acute blood in the atria of bilateral lateral ventricles, new. The remainder of the examination is grossly unchanged. This document has been electronically signed by: Anurag Kwong MD on 04/30/2025 05:33 AM All CTs at this facility use dose modulation techniques and iterative reconstructions, and/or weight-based dosing when appropriate to reduce radiation to a low as reasonably achievable.    CT CERVICAL SPINE WO CONTRAST  Result Date: 4/29/2025  1. No fracture or spondylolisthesis of the cervical spine. 2. Multilevel degenerative disc disease, neuroforaminal narrowing and central canal stenosis. **This report has been created using voice recognition software. It may contain minor errors which are inherent in voice recognition technology.** Electronically signed by Dr. Subhash Van    XR CHEST (2 VW)  Result Date: 4/29/2025  No acute intrathoracic process. **This report has been created using voice recognition software. It may contain minor errors which are inherent in voice recognition technology.** Electronically signed by Dr. Subhash Van    CT HEAD WO CONTRAST  Result Date: 4/29/2025  1. Acute subarachnoid hemorrhages bilaterally. The critical  finding(s) was called to Dr. Gertrudis Bridges at 1850 hours on 4/29/2025 by Dr. Willard. Verbal acknowledgment and readback was given. 2. Sequela of chronic microvascular changes. 3. A large left scalp hematoma **This report has been created using voice recognition software.  It may contain minor errors which are inherent in voice recognition technology.** Electronically signed by Dr. Luann Willard       [unfilled]    Discharge Medications    Current Discharge Medication List    START taking these medications    levETIRAcetam (KEPPRA) 500 MG

## 2025-05-02 NOTE — DISCHARGE INSTR - DIET
Good nutrition is important when healing from an illness, injury, or surgery.  Follow any nutrition recommendations given to you during your hospital stay.   If you were given an oral nutrition supplement while in the hospital, continue to take this supplement at home.  You can take it with meals, in-between meals, and/or before bedtime. These supplements can be purchased at most local grocery stores, pharmacies, and chain [employer]-stores.   If you have any questions about your diet or nutrition, call the hospital and ask for the dietitian.  Discussed importance of meeting daily protein goal of 60g. Encouraged protein shakes as well hydration.

## 2025-05-02 NOTE — PROGRESS NOTES
[facility]  INPATIENT PHYSICAL THERAPY  DAILY NOTE  STR NEUROSCIENCES 4A - 4A-03/003-A      Discharge Recommendations: Home with Assist as Needed and Home with Outpatient PT (patient's daughter plans to stay with her upon initial return to home)  Equipment Recommendations: No               Time In: 1200  Time Out: 1238  Timed Code Treatment Minutes: 38 Minutes  Minutes: 38          Date: 2025  Patient Name: Nicci Mooney,  Gender:  female        MRN: [id number]  : 1950  (74 y.o.)     Referring Practitioner: Dr. TETE Womack  Diagnosis: SAH (subarachnoid hemorrhage) ([facility])  Additional Pertinent Hx: 74F PMHx hypertension, hyperlipidemia presenting s/p questionable syncopal with fall and head strike to posterior aspect of scalp approximately 36-48 hours ago.  Patient is not a reliable historian as she has vague memory of event however she is A/OX4 and GCS 15 at this time.  CT on arrival shows acute subarachnoid hemorrhages bilaterally with a large left scalp hematoma.  Repeat head CT this morning shows small acute hemorrhages in the bilateral ventricles. Neurosurgery recommends no need for surgical management at this time.     Prior Level of Function:  Lives With: Alone  Type of Home: House  Home Layout: Two level, Work area in basement, Bed/Bath upstairs, 1/2 bath on main level  Home Access: Stairs to enter with rails  Entrance Stairs - Number of Steps: 2 JUAN RAMON home;  ~13 steps to basement where she keeps her cat supplies; ~13 steps to upstairs where her bed and shower are.  Home Equipment: Walker - Rolling, Cane   Bathroom Shower/Tub: Walk-in shower  Bathroom Toilet: Standard  Bathroom Equipment: Built-in shower seat    Receives Help From:  (typically needs no help)  Prior Level of Assist for ADLs: Independent  Prior Level of Assist for Homemaking: Independent  Homemaking Responsibilities: Yes  Prior Level of Assist for Transfers: Independent  Active : Yes  Additional Comments:

## 2025-05-05 ENCOUNTER — TELEPHONE (OUTPATIENT)
Dept: FAMILY MEDICINE CLINIC | Age: 75
End: 2025-05-05

## 2025-05-05 ENCOUNTER — OFFICE VISIT (OUTPATIENT)
Dept: FAMILY MEDICINE CLINIC | Age: 75
End: 2025-05-05

## 2025-05-05 VITALS
HEART RATE: 72 BPM | TEMPERATURE: 97.7 F | OXYGEN SATURATION: 98 % | SYSTOLIC BLOOD PRESSURE: 122 MMHG | DIASTOLIC BLOOD PRESSURE: 66 MMHG | RESPIRATION RATE: 16 BRPM | WEIGHT: 116.2 LBS | BODY MASS INDEX: 21.38 KG/M2 | HEIGHT: 62 IN

## 2025-05-05 DIAGNOSIS — R11.0 NAUSEA: ICD-10-CM

## 2025-05-05 DIAGNOSIS — Z86.79 HISTORY OF SUBARACHNOID HEMORRHAGE: ICD-10-CM

## 2025-05-05 DIAGNOSIS — G25.0 ESSENTIAL TREMOR: ICD-10-CM

## 2025-05-05 DIAGNOSIS — M47.812 CERVICAL ARTHRITIS: ICD-10-CM

## 2025-05-05 DIAGNOSIS — Z09 HOSPITAL DISCHARGE FOLLOW-UP: Primary | ICD-10-CM

## 2025-05-05 RX ORDER — MULTIVIT WITH MINERALS/LUTEIN
250 TABLET ORAL DAILY
COMMUNITY

## 2025-05-05 RX ORDER — METOPROLOL SUCCINATE 50 MG/1
50 TABLET, EXTENDED RELEASE ORAL DAILY
Qty: 90 TABLET | Refills: 0
Start: 2025-05-05

## 2025-05-05 RX ORDER — ONDANSETRON 4 MG/1
4 TABLET, ORALLY DISINTEGRATING ORAL 3 TIMES DAILY PRN
Qty: 21 TABLET | Refills: 0 | Status: SHIPPED | OUTPATIENT
Start: 2025-05-05

## 2025-05-05 RX ORDER — MULTIVIT-MIN/IRON/FOLIC ACID/K 18-600-40
2000 CAPSULE ORAL DAILY
COMMUNITY

## 2025-05-05 NOTE — PROGRESS NOTES
Health Maintenance Due   Topic Date Due    DTaP/Tdap/Td vaccine (1 - Tdap) Never done    COVID-19 Vaccine (11 - 2024-25 season) 04/17/2025

## 2025-05-05 NOTE — PROGRESS NOTES
Post-Discharge Transitional Care Follow Up      Nicci Mooney   YOB: 1950    Date of Office Visit:  2025  Date of Hospital Admission: 25  Date of Hospital Discharge: 25  Readmission Risk Score (high >=14%. Medium >=10%):Readmission Risk Score: 10.7      Care management risk score Rising risk (score 2-5) and Complex Care (Scores >=6): No Risk Score On File     Non face to face  following discharge, date last encounter closed (first attempt may have been earlier): *No documented post hospital discharge outreach found in the last 14 days     Call initiated 2 business days of discharge: *No response recorded in the last 14 days                 Nicci Mooney (:  1950) is a 74 y.o. female, Established patient, here for evaluation of the following chief complaint(s):  Follow-Up from Hospital (Fell and cracked her head in three places. Tripped on a piece of transition piece at home. Daughter notes that the therapist in the hospital told her to have pcp order pt, ot and speech therapy.  Has had a lot of rough mornings with nausea and not eating well. States she has lost a little weight. Does mention a lot of headaches and was instructed to use tylenol does not think this is helping much. )         Assessment & Plan  1. Subarachnoid hemorrhage.  - Diagnosed with a subarachnoid hemorrhage following an unwitnessed fall at home. Initial CT scan revealed a subacute arachnoid hemorrhage bilaterally, with a subsequent scan showing a new acute hemorrhage in the atria of the bilateral lateral ventricles.  - Follow-up CT scan on 2025 indicated the development of a thin low attenuation left-sided subdural collection measuring 4 mm, which remained stable on a repeat scan on 2025.  - Neurosurgery recommended a 7-day course of Keppra and an outpatient follow-up with a repeat CT scan in 1 week. Physical therapy was also recommended for home assistance and outpatient follow-up. The

## 2025-05-05 NOTE — TELEPHONE ENCOUNTER
Care Transitions Initial Follow Up Call    Outreach made within 2 business days of discharge: Yes    Patient: Nicci Mooney Patient : 1950   MRN: [id number]  Reason for Admission: SAH  Discharge Date: 25       Spoke with: trenton Olivo     Discharge department/facility: [facility]    TCM Interactive Patient Contact:  Was patient able to fill all prescriptions: Yes  Was patient instructed to bring all medications to the follow-up visit: Daughter thinks she does not   Is patient taking all medications as directed in the discharge summary? Yes  Does patient understand their discharge instructions: Yes  Does patient have questions or concerns that need addressed prior to 7-14 day follow up office visit: no    Additional needs identified to be addressed with provider  Daughter notes that the therapist in the hospital told her to have pcp order pt, ot and speech therapy.  Has had a lot of rough mornings with nausea and not eating well. States she has lost a little weight. Does mention a lot of headaches and was instructed to use tylenol does not think this is helping much.              Scheduled appointment with PCP within 7-14 days    Follow Up  Future Appointments   Date Time Provider Department Center   2025 11:20 AM Kristie Celaya APRN - CNP SRPX ADA [facility] ECC DEP   2025  8:00 AM STR CT IMAGING RM1 STRZ CT SCAN STR Rad/Card   2025  8:30 AM STR VASCULAR LAB ROOM 1 STRZ VAS LAB STR Rad/Card   2025 10:20 AM Kristie Celaya APRN - CNP SRPX [facility] ECC DEP       Jovana Jenkins MA

## 2025-05-06 DIAGNOSIS — I10 ESSENTIAL HYPERTENSION: ICD-10-CM

## 2025-05-06 RX ORDER — LISINOPRIL AND HYDROCHLOROTHIAZIDE 12.5; 2 MG/1; MG/1
1 TABLET ORAL DAILY
Qty: 90 TABLET | Refills: 3 | Status: SHIPPED | OUTPATIENT
Start: 2025-05-06

## 2025-05-07 NOTE — PROGRESS NOTES
Physician Progress Note      PATIENT:               ELENA YANCEY  CSN #:                  [id number]  :                       1950  ADMIT DATE:       2025 4:19 PM  DISCH DATE:        2025 8:43 PM  RESPONDING  PROVIDER #:        Rupert Cabrera MD          QUERY TEXT:    Patient was admitted with traumatic subarachnoid hemorrhage. Patient was noted   to have troponin levels 14 and 15.    Given the clinical context, can the elevated troponins be more specifically   defined as.    The clinical indicators include:  -Admitted 25, discharged 25 with traumatic subarachnoid hemorrhage  - 25 high sensitivity troponins 14-15  - per H/P:\" presented after falling at home last evening... Denies any chest   pain\"  Options provided:  -- Myocardial injury without infarction due to traumatic subarachnoid   hemorrhage  -- Clinically insignificant troponin elevation  -- Other - I will add my own diagnosis  -- Disagree - Not applicable / Not valid  -- Disagree - Clinically unable to determine / Unknown  -- Refer to Clinical Documentation Reviewer    PROVIDER RESPONSE TEXT:    Clinically insignificant troponin elevation    Query created by: Ellie Bernal on 2025 8:22 AM      Electronically signed by:  Rupert Cabrera MD 2025 9:17 AM

## 2025-05-08 ENCOUNTER — HOSPITAL ENCOUNTER (OUTPATIENT)
Dept: PHYSICAL THERAPY | Age: 75
Setting detail: THERAPIES SERIES
Discharge: HOME | End: 2025-05-08
Payer: MEDICARE

## 2025-05-08 ENCOUNTER — HOSPITAL ENCOUNTER (OUTPATIENT)
Dept: CT IMAGING | Age: 75
Discharge: HOME | End: 2025-05-08
Attending: NURSE PRACTITIONER
Payer: MEDICARE

## 2025-05-08 ENCOUNTER — HOSPITAL ENCOUNTER (OUTPATIENT)
Dept: INTERVENTIONAL RADIOLOGY/VASCULAR | Age: 75
Discharge: HOME | End: 2025-05-10
Attending: NURSE PRACTITIONER
Payer: MEDICARE

## 2025-05-08 ENCOUNTER — HOSPITAL ENCOUNTER (OUTPATIENT)
Dept: SPEECH THERAPY | Age: 75
Setting detail: THERAPIES SERIES
Discharge: HOME | End: 2025-05-08
Payer: MEDICARE

## 2025-05-08 DIAGNOSIS — R55 SYNCOPE AND COLLAPSE: ICD-10-CM

## 2025-05-08 DIAGNOSIS — S06.6X9A TRAUMATIC SUBARACHNOID HEMATOMA WITH LOSS OF CONSCIOUSNESS, INITIAL ENCOUNTER (HCC): ICD-10-CM

## 2025-05-08 PROCEDURE — 70450 CT HEAD/BRAIN W/O DYE: CPT

## 2025-05-08 PROCEDURE — 92523 SPEECH SOUND LANG COMPREHEN: CPT

## 2025-05-08 PROCEDURE — 93880 EXTRACRANIAL BILAT STUDY: CPT

## 2025-05-08 NOTE — PROGRESS NOTES
[facility]  SPEECH THERAPY  [x] SPEECH LANGUAGE COGNITIVE EVALUATION  [] DAILY NOTE   [] PROGRESS NOTE [] DISCHARGE NOTE    [x] OUTPATIENT REHABILITATION CENTER [facility]   [] [facility] CARE [place]    [] [facility]   [] [facility]    Date: 2025  Patient Name:  Nicci Mooney \"Donavon\"  : 1950  MRN: [id number]  CSN: [id number]    Referring Practitioner Kristie Celaya, SONYA - CNP [phone]      Diagnosis  Diagnoses       Z86.79 (ICD-10-CM) - History of subarachnoid hemorrhage           Treatment Diagnosis R41.89 Other symptoms and signs involving cognitive functions and awareness      Date of Evaluation 25   Additional Pertinent History Nicci Mooney has a past medical history of Asthma, Chronic back pain, Chronic kidney disease, Colitis, GERD (gastroesophageal reflux disease), Glaucoma, Grieving, Hearing loss, History of tobacco abuse, Hyperlipidemia, and Hypertension.  she has a past surgical history that includes Hysterectomy; Tonsillectomy;  section; Breast surgery; Septoplasty (2016); turbinate resection (2016); eye surgery (20 and 21); Hysterectomy, total abdominal (); and Upper gastrointestinal endoscopy (21).     Allergies Allergies   Allergen Reactions    Keflex [Cephalexin] Hives    Theophyllines Nausea And Vomiting      Medications   Current Outpatient Medications:     lisinopril-hydroCHLOROthiazide (PRINZIDE;ZESTORETIC) 20-12.5 MG per tablet, TAKE 1 TABLET EVERY DAY, Disp: 90 tablet, Rfl: 3    vitamin D 50 MCG (2000) CAPS capsule, Take 1 capsule by mouth daily, Disp: , Rfl:     Ascorbic Acid (VITAMIN C) 250 MG tablet, Take 1 tablet by mouth daily, Disp: , Rfl:     metoprolol succinate (TOPROL XL) 50 MG extended release tablet, Take 1 tablet by mouth daily, Disp: 90 tablet, Rfl: 0    ondansetron (ZOFRAN-ODT) 4 MG disintegrating tablet, Take 1 tablet by mouth 3 times daily as needed for Nausea or Vomiting,

## 2025-05-08 NOTE — PROGRESS NOTES
[facility]  PHYSICAL THERAPY  [x] EVALUATION  [] DAILY NOTE (LAND) [] DAILY NOTE (AQUATIC ) [] PROGRESS NOTE [] DISCHARGE NOTE    [x] OUTPATIENT REHABILITATION CENTER [facility]   [] [place] AMBULATORY CARE [place]    [] [facility]   [] [facility]    Date: 2025  Patient Name:  Nicci Mooney \"Donavon\"   : 1950  MRN: [id number]  [facility]: [id number]    Referring Practitioner Kristie Celaya, APRN - CNP [phone]      Diagnosis  Diagnoses       Z86.79 (ICD-10-CM) - History of subarachnoid hemorrhage           Treatment Diagnosis R26.81  Unsteadiness on feet  R53.1 Weakness  R26.2 Difficulty in walking   Date of Evaluation 25   Additional Pertinent History Nicci Mooney has a past medical history of Asthma, Chronic back pain, Chronic kidney disease, Colitis, GERD (gastroesophageal reflux disease), Glaucoma, Grieving, Hearing loss, History of tobacco abuse, Hyperlipidemia, and Hypertension.  she has a past surgical history that includes Hysterectomy; Tonsillectomy;  section; Breast surgery; Septoplasty (2016); turbinate resection (2016); eye surgery (20 and 21); Hysterectomy, total abdominal (); and Upper gastrointestinal endoscopy (21).     Allergies Allergies   Allergen Reactions    Keflex [Cephalexin] Hives    Theophyllines Nausea And Vomiting      Medications   Current Outpatient Medications:     lisinopril-hydroCHLOROthiazide (PRINZIDE;ZESTORETIC) 20-12.5 MG per tablet, TAKE 1 TABLET EVERY DAY, Disp: 90 tablet, Rfl: 3    vitamin D 50 MCG (2000) CAPS capsule, Take 1 capsule by mouth daily, Disp: , Rfl:     Ascorbic Acid (VITAMIN C) 250 MG tablet, Take 1 tablet by mouth daily, Disp: , Rfl:     metoprolol succinate (TOPROL XL) 50 MG extended release tablet, Take 1 tablet by mouth daily, Disp: 90 tablet, Rfl: 0    ondansetron (ZOFRAN-ODT) 4 MG disintegrating tablet, Take 1 tablet by mouth 3 times daily as needed for Nausea or

## 2025-05-12 ENCOUNTER — HOSPITAL ENCOUNTER (OUTPATIENT)
Dept: PHYSICAL THERAPY | Age: 75
Setting detail: THERAPIES SERIES
Discharge: HOME | End: 2025-05-12
Payer: MEDICARE

## 2025-05-12 ENCOUNTER — TELEPHONE (OUTPATIENT)
Dept: FAMILY MEDICINE CLINIC | Age: 75
End: 2025-05-12

## 2025-05-12 PROCEDURE — 97110 THERAPEUTIC EXERCISES: CPT

## 2025-05-12 NOTE — TELEPHONE ENCOUNTER
Called Donavon, she verbalizes understanding about the miralax and will do a fleets enema if she does not have a bowel movement by tomorrow. She has not other questions at this time.   
Patient had physical therapy today, therapist called and stated patient is having severe pain in tail bone. 8 or 9 out of 10 on pain scale along with severe constipation since previous hospital stay. Wanting to know if you want to order any imaging for patient or want to see patient for this.,  Please advise  
The pain near her tailbone can be due to her constipation.  I would advise taking MiraLAX 17 g tonight along with the next 7 days.  If she does not have a bowel movement by tomorrow she can do a fleets enema.  She does have a follow-up with neurosurgery and so if her tailbone still hurts and she is no longer constipated they can order an x-ray after evaluation.  If she would like I would be happy to see her for an appointment as well  
[FreeTextEntry1] : The procedure risks, hazards and alternatives were discussed with the patient and appropriate consent was obtained. The area over the myofascial spasm / pain was prepped with alcohol utilizing sterile technique. After isolating it between two palpating fingertips a 27 gauge 1.5” needle was placed in the center of the myofascial spasm and a negative aspiration was performed. A total of 5 mL of 1% Lidocaine mixed with Kenalog 40 mg was injected into 2 sites. The patient tolerated procedure well without any apparent difficulties or complications. \par \par MAGALIE SCHAFFER was monitored in the office for 5 minutes after injections and tolerated procedure well without adverse events.\par

## 2025-05-12 NOTE — PROGRESS NOTES
HEP      [x]  Patient verbalized and/or demonstrated understanding of education provided.  []  Patient unable to verbalize and/or demonstrate understanding of education provided.  Will continue education.  []  Barriers to learning:     PLAN:  Treatment Recommendations: Strengthening, Balance Training, Functional Mobility Training, Transfer Training, Endurance Training, Gait Training, Stair Training, Neuromuscular Re-education, Manual Therapy - Soft Tissue Mobilization, Pain Management, Home Exercise Program, Patient Education, and Modalities    []  Plan of care initiated.  Plan to see patient 2 times per week for 8 weeks to address the treatment planned outlined above.  [x]  Continue with current plan of care  []  Modify plan of care as follows:    []  Hold pending physician visit  []  Discharge    Time In 1305   Time Out 1345   Timed Code Minutes: 40 min   Total Treatment Time: 40 min       Electronically Signed by: Swati Baugh PT

## 2025-05-14 ENCOUNTER — HOSPITAL ENCOUNTER (OUTPATIENT)
Dept: SPEECH THERAPY | Age: 75
Setting detail: THERAPIES SERIES
Discharge: HOME | End: 2025-05-14
Payer: MEDICARE

## 2025-05-14 PROCEDURE — 97130 THER IVNTJ EA ADDL 15 MIN: CPT | Performed by: SPEECH-LANGUAGE PATHOLOGIST

## 2025-05-14 PROCEDURE — 97129 THER IVNTJ 1ST 15 MIN: CPT | Performed by: SPEECH-LANGUAGE PATHOLOGIST

## 2025-05-14 NOTE — PROGRESS NOTES
[facility]  SPEECH THERAPY  [] SPEECH LANGUAGE COGNITIVE EVALUATION  [x] DAILY NOTE   [] PROGRESS NOTE [] DISCHARGE NOTE    [x] OUTPATIENT REHABILITATION CENTER [facility]   [] [facility] CARE [place]    [] [facility]   [] [facility]    Date: 2025  Patient Name:  Nicci Mooney \"Donavon\"  : 1950  MRN: [id number]  CSN: [id number]    Referring Practitioner Kristie Celaya, SONYA - CNP [phone]      Diagnosis  Diagnoses       Z86.79 (ICD-10-CM) - History of subarachnoid hemorrhage           Treatment Diagnosis R41.89 Other symptoms and signs involving cognitive functions and awareness      Date of Evaluation 25   Additional Pertinent History Nicci Mooney has a past medical history of Asthma, Chronic back pain, Chronic kidney disease, Colitis, GERD (gastroesophageal reflux disease), Glaucoma, Grieving, Hearing loss, History of tobacco abuse, Hyperlipidemia, and Hypertension.  she has a past surgical history that includes Hysterectomy; Tonsillectomy;  section; Breast surgery; Septoplasty (2016); turbinate resection (2016); eye surgery (20 and 21); Hysterectomy, total abdominal (); and Upper gastrointestinal endoscopy (21).     Allergies Allergies   Allergen Reactions    Keflex [Cephalexin] Hives    Theophyllines Nausea And Vomiting      Medications   Current Outpatient Medications:     lisinopril-hydroCHLOROthiazide (PRINZIDE;ZESTORETIC) 20-12.5 MG per tablet, TAKE 1 TABLET EVERY DAY, Disp: 90 tablet, Rfl: 3    vitamin D 50 MCG (2000) CAPS capsule, Take 1 capsule by mouth daily, Disp: , Rfl:     Ascorbic Acid (VITAMIN C) 250 MG tablet, Take 1 tablet by mouth daily, Disp: , Rfl:     metoprolol succinate (TOPROL XL) 50 MG extended release tablet, Take 1 tablet by mouth daily, Disp: 90 tablet, Rfl: 0    ondansetron (ZOFRAN-ODT) 4 MG disintegrating tablet, Take 1 tablet by mouth 3 times daily as needed for Nausea or Vomiting,

## 2025-05-15 ENCOUNTER — OFFICE VISIT (OUTPATIENT)
Dept: NEUROSURGERY | Age: 75
End: 2025-05-15

## 2025-05-15 VITALS
WEIGHT: 116 LBS | DIASTOLIC BLOOD PRESSURE: 62 MMHG | HEIGHT: 62 IN | BODY MASS INDEX: 21.35 KG/M2 | SYSTOLIC BLOOD PRESSURE: 113 MMHG | HEART RATE: 65 BPM

## 2025-05-15 DIAGNOSIS — S06.5XAA SDH (SUBDURAL HEMATOMA) (HCC): ICD-10-CM

## 2025-05-15 DIAGNOSIS — I60.9 SAH (SUBARACHNOID HEMORRHAGE) (HCC): Primary | ICD-10-CM

## 2025-05-15 DIAGNOSIS — G44.309 POST-TRAUMATIC HEADACHE, NOT INTRACTABLE, UNSPECIFIED CHRONICITY PATTERN: ICD-10-CM

## 2025-05-15 RX ORDER — POLYETHYLENE GLYCOL 3350 17 G/17G
17 POWDER, FOR SOLUTION ORAL DAILY
COMMUNITY

## 2025-05-15 NOTE — PROGRESS NOTES
[facility] PHYSICIANS [place] [facility]  [facility] NEUROSURGERY  [address]  [facility] [address]  Dept: [phone]  Loc: [phone]       Patient Name:  Nicci Mooney  Visit Date:  5/15/2025    HPI:     Ms. Mooney is a 74 y.o. female that presents today at [facility] Neurosurgery for evaluation of the following: Has a known history of hypertension, asthma chronic kidney disease who was initially seen at [facility] as a level 2 trauma with a fall in which she sustained a subarachnoid and subdural hemorrhage along with a laceration of the occipital region with a hematoma.  She is seen in follow-up today with a repeat head CT which does indicate resolution of previously seen subarachnoid and subdural hemorrhage radiologist does indicate that better evaluation with an MRI of the brain would be better served for an evaluation of this patient.  She presents complaining of daily headaches but are a lesser severity than prior.  She notes her memory seems to be intermittent with being aware of previous discussions or people.  She does mobilize with the assistance of a cane since her fall.  She does present with her daughter as patient lives alone at home alone which is assisting with her for the next few weeks.  She denies any visual changes aside from her chronic glaucoma and muscular degenerative changes.  She denies any concerns on evaluation today.        Chief Complaint   Patient presents with    Follow-up     Follow up           Allergies   Allergen Reactions    Keflex [Cephalexin] Hives    Theophyllines Nausea And Vomiting        Past Medical History  Nicci  has a past medical history of Asthma, Chronic back pain, Chronic kidney disease, Colitis, GERD (gastroesophageal reflux disease), Glaucoma, Grieving, Hearing loss, History of tobacco abuse, Hyperlipidemia, and Hypertension.    Past Surgical History  The patient  has a past surgical history that includes Hysterectomy;

## 2025-05-16 ENCOUNTER — HOSPITAL ENCOUNTER (OUTPATIENT)
Dept: PHYSICAL THERAPY | Age: 75
Setting detail: THERAPIES SERIES
Discharge: HOME | End: 2025-05-16
Payer: MEDICARE

## 2025-05-16 PROCEDURE — 97110 THERAPEUTIC EXERCISES: CPT

## 2025-05-16 PROCEDURE — 97116 GAIT TRAINING THERAPY: CPT

## 2025-05-16 ASSESSMENT — ENCOUNTER SYMPTOMS
CHEST TIGHTNESS: 0
COUGH: 0
TROUBLE SWALLOWING: 0
SHORTNESS OF BREATH: 0
VOICE CHANGE: 0
ABDOMINAL DISTENTION: 0
COLOR CHANGE: 0
VOMITING: 0
PHOTOPHOBIA: 0
WHEEZING: 0
NAUSEA: 0
CHOKING: 0
BACK PAIN: 0

## 2025-05-16 NOTE — PROGRESS NOTES
[facility]  PHYSICAL THERAPY  [] EVALUATION  [x] DAILY NOTE (LAND) [] DAILY NOTE (AQUATIC ) [] PROGRESS NOTE [] DISCHARGE NOTE    [x] OUTPATIENT REHABILITATION CENTER [facility]   [] [place] AMBULATORY CARE [place]    [] [facility]   [] [facility]    Date: 2025  Patient Name:  Nicci Mooney \"Donavon\"   : 1950  MRN: [id number]  CSN: [id number]    Referring Practitioner Kristie Celaya, APRN - CNP [phone]      Diagnosis  Diagnoses       Z86.79 (ICD-10-CM) - History of subarachnoid hemorrhage           Treatment Diagnosis R26.81  Unsteadiness on feet  R53.1 Weakness  R26.2 Difficulty in walking   Date of Evaluation 25   Additional Pertinent History Nicci Mooney has a past medical history of Asthma, Chronic back pain, Chronic kidney disease, Colitis, GERD (gastroesophageal reflux disease), Glaucoma, Grieving, Hearing loss, History of tobacco abuse, Hyperlipidemia, and Hypertension.  she has a past surgical history that includes Hysterectomy; Tonsillectomy;  section; Breast surgery; Septoplasty (2016); turbinate resection (2016); eye surgery (20 and 21); Hysterectomy, total abdominal (); and Upper gastrointestinal endoscopy (21).     Allergies Allergies   Allergen Reactions    Keflex [Cephalexin] Hives    Theophyllines Nausea And Vomiting      Medications   Current Outpatient Medications:     polyethylene glycol (MIRALAX) 17 g PACK packet, Take 17 g by mouth daily, Disp: , Rfl:     lisinopril-hydroCHLOROthiazide (PRINZIDE;ZESTORETIC) 20-12.5 MG per tablet, TAKE 1 TABLET EVERY DAY, Disp: 90 tablet, Rfl: 3    vitamin D 50 MCG (2000) CAPS capsule, Take 1 capsule by mouth daily, Disp: , Rfl:     Ascorbic Acid (VITAMIN C) 250 MG tablet, Take 1 tablet by mouth daily, Disp: , Rfl:     metoprolol succinate (TOPROL XL) 50 MG extended release tablet, Take 1 tablet by mouth daily, Disp: 90 tablet, Rfl: 0    ondansetron (ZOFRAN-ODT) 4 MG

## 2025-05-19 DIAGNOSIS — J30.2 SEASONAL ALLERGIES: ICD-10-CM

## 2025-05-19 LAB — ECHO BSA: 1.51 M2

## 2025-05-19 RX ORDER — MONTELUKAST SODIUM 10 MG/1
10 TABLET ORAL NIGHTLY
Qty: 90 TABLET | Refills: 3 | Status: SHIPPED | OUTPATIENT
Start: 2025-05-19

## 2025-05-19 RX ORDER — LORATADINE 10 MG/1
10 TABLET ORAL DAILY
Qty: 90 TABLET | Refills: 3 | Status: SHIPPED | OUTPATIENT
Start: 2025-05-19

## 2025-05-19 NOTE — TELEPHONE ENCOUNTER
Patient also asking for albuterol inhaler to be refilled.    Last Appt. 5/5/2025   Next Appt. 6/5/2025   RX Pended

## 2025-05-22 ENCOUNTER — HOSPITAL ENCOUNTER (OUTPATIENT)
Dept: SPEECH THERAPY | Age: 75
Setting detail: THERAPIES SERIES
Discharge: HOME | End: 2025-05-22
Payer: MEDICARE

## 2025-05-22 PROCEDURE — 97129 THER IVNTJ 1ST 15 MIN: CPT

## 2025-05-22 PROCEDURE — 97130 THER IVNTJ EA ADDL 15 MIN: CPT

## 2025-05-22 NOTE — PROGRESS NOTES
[facility]  SPEECH THERAPY  [] SPEECH LANGUAGE COGNITIVE EVALUATION  [x] DAILY NOTE   [] PROGRESS NOTE [] DISCHARGE NOTE    [x] OUTPATIENT REHABILITATION CENTER [facility]   [] [facility] CARE [place]    [] [facility]   [] [facility]    Date: 2025  Patient Name:  Nicci Mooney \"Donavon\"  : 1950  MRN: [id number]  CSN: [id number]    Referring Practitioner Kristie Celaya, SONYA - CNP [phone]      Diagnosis  Diagnoses       Z86.79 (ICD-10-CM) - History of subarachnoid hemorrhage           Treatment Diagnosis R41.89 Other symptoms and signs involving cognitive functions and awareness      Date of Evaluation 25   Additional Pertinent History Nicci Mooney has a past medical history of Asthma, Chronic back pain, Chronic kidney disease, Colitis, GERD (gastroesophageal reflux disease), Glaucoma, Grieving, Hearing loss, History of tobacco abuse, Hyperlipidemia, and Hypertension.  she has a past surgical history that includes Hysterectomy; Tonsillectomy;  section; Breast surgery; Septoplasty (2016); turbinate resection (2016); eye surgery (20 and 21); Hysterectomy, total abdominal (); and Upper gastrointestinal endoscopy (21).     Allergies Allergies   Allergen Reactions    Keflex [Cephalexin] Hives    Theophyllines Nausea And Vomiting      Medications   Current Outpatient Medications:     montelukast (SINGULAIR) 10 MG tablet, TAKE 1 TABLET EVERY NIGHT, Disp: 90 tablet, Rfl: 3    loratadine (CLARITIN) 10 MG tablet, Take 1 tablet by mouth daily, Disp: 90 tablet, Rfl: 3    polyethylene glycol (MIRALAX) 17 g PACK packet, Take 17 g by mouth daily, Disp: , Rfl:     lisinopril-hydroCHLOROthiazide (PRINZIDE;ZESTORETIC) 20-12.5 MG per tablet, TAKE 1 TABLET EVERY DAY, Disp: 90 tablet, Rfl: 3    vitamin D 50 MCG ( [place]) CAPS capsule, Take 1 capsule by mouth daily, Disp: , Rfl:     Ascorbic Acid (VITAMIN C) 250 MG tablet, Take 1 tablet

## 2025-05-23 ENCOUNTER — HOSPITAL ENCOUNTER (OUTPATIENT)
Dept: PHYSICAL THERAPY | Age: 75
Setting detail: THERAPIES SERIES
Discharge: HOME | End: 2025-05-23
Payer: MEDICARE

## 2025-05-23 PROCEDURE — 97110 THERAPEUTIC EXERCISES: CPT

## 2025-05-23 NOTE — PROGRESS NOTES
Stroke  / Fall risk    Pain: Intermittent Global Headaches, Low Back, neck     \"X” in shaded column indicates activity completed today    “*\" next to exercise/intervention indicates progression   Modalities Parameters/  Location  Notes                     Manual Therapy Time/Technique  Notes                     Exercise/Intervention   Notes   Nu-Step  5 min L 1 X           Alt Step Tapping Forward  10 4 in X 1 UE   Lateral Step tapping  10 4 in X 1 UE    Step-Ups: Forward and Lateral *   10 4 in X    HR/TR 10  X No UE   Marches 10  X No UE   HS curl 10  X No UE    Low kimi sling shot 10  X No UE *   Rhomberg: EO/EC 30s  X No sway, add airex next visit   Tandem 30s  X Intermittent tap of UE for balance control   Dynamic Gait: Marching, Tandem * 2 laps  X    TUG                     Seated:       Hamstring stretch 3x20s  x    LAQ 5x2s  x           Gait training with Hurricane     Education on importance of using cane AAT due to high risk for falls.    Gait training no AD: head turns and nods 150'  X Moderate gait deviation noted, no LOB          Skin inspection       Piriformis stretch 15\" x 3   B - R > L pain   LTR 5\"x5        Interventions Next Treatment: Gait/balance training, LE strengthening. Progress to LRAD. Issue HEP.    Activity/Treatment Tolerance:  []  Patient tolerated treatment well  []  Patient limited by fatigue  []  Patient limited by pain   []  Patient limited by medical complications  []  Other:     Assessment:  Treatment interventions completed as recorded above. Progressions and additional interventions indicated by * in chart. Patient denied any adverse effects at termination of treatment session this date.         GOALS:  Patient Goal: Fill bird feeders, pain management, independence     Short Term Goals: 4 weeks   Pt to report >= 50% improvement in headache frequency for improved activity tolerance.   Pt to be compliant and independent with HEP for optimal outcomes.   Pt to improve TUG (No AD)

## 2025-05-28 ENCOUNTER — HOSPITAL ENCOUNTER (OUTPATIENT)
Dept: SPEECH THERAPY | Age: 75
Setting detail: THERAPIES SERIES
Discharge: HOME | End: 2025-05-28
Payer: MEDICARE

## 2025-05-28 ENCOUNTER — HOSPITAL ENCOUNTER (OUTPATIENT)
Dept: PHYSICAL THERAPY | Age: 75
Setting detail: THERAPIES SERIES
Discharge: HOME | End: 2025-05-28
Payer: MEDICARE

## 2025-05-28 PROCEDURE — 97130 THER IVNTJ EA ADDL 15 MIN: CPT | Performed by: SPEECH-LANGUAGE PATHOLOGIST

## 2025-05-28 PROCEDURE — 97129 THER IVNTJ 1ST 15 MIN: CPT | Performed by: SPEECH-LANGUAGE PATHOLOGIST

## 2025-05-28 PROCEDURE — 97530 THERAPEUTIC ACTIVITIES: CPT

## 2025-05-28 NOTE — PROGRESS NOTES
TERM GOAL #5: Patient will complete verbal description and divergent naming tasks with 80% accuracy provided mod cues to improve overall word finding.   INTERVENTIONS: Patient reports she is doing much better with her word finding. Patient states, \"I'm feeling much more comfortable in speaking now.\"  No overt word finding difficulty throughout this session.    Previous session:   Divergent naming: Target 10 members/60 seconds   Furniture: 10 members/60 seconds   Book Authors:  8 members/60 seconds   *patient frustrated with self with this specific category     Long Term Goals:  Time Frame for Long-Term Goals: 4 weeks    LONG-TERM GOAL #1: Patient will improve overall FOM MEMORY Score from a Level 5 to a Level 7 in order to improve overall memory.  ONGOING      Areas for Improvement: Impaired cognition  Prognosis: good  Specific Interventions Next Treatment: Verbal description, executive functioning (phone navigation), low stimulation, ACE, Memory-WRAP        Activity/Treatment Tolerance:  [x]  Patient tolerated treatment well  []  Patient limited by fatigue  []  Patient limited by pain   []  Patient limited by other medical complications  []  Other:     Patient Education:   [x]  HEP/Education Completed: Plan of Care, goals, memory strategies, strategies to assist with taking medications consistently, progress   []  No new Education completed  []  Reviewed Prior HEP      [x]  Patient verbalized and/or demonstrated understanding of education provided.  []  Patient unable to verbalize and/or demonstrate understanding of education provided.  Will continue education.  [x]  Barriers to learning: Memory     PLAN:  Treatment Recommendations:     []  Plan of care initiated.  Plan to see patient 1 times per week for 4 weeks to address the treatment planned outlined above.  [x]  Continue with current plan of care  []  Modify plan of care as follows:    []  Hold pending physician visit  []  Discharge    Time In 1402   Time Out

## 2025-05-28 NOTE — PROGRESS NOTES
[facility]  PHYSICAL THERAPY  [] EVALUATION  [x] DAILY NOTE (LAND) [] DAILY NOTE (AQUATIC ) [] PROGRESS NOTE [] DISCHARGE NOTE    [x] OUTPATIENT REHABILITATION CENTER [facility]   [] [place] [facility] [place]    [] [facility]   [] [facility]    Date: 2025  Patient Name:  Nicci Mooney \"Donavon\"   : 1950  MRN: [id number]  CSN: [id number]    Referring Practitioner Kristie Celaya, APRN - CNP [phone]      Diagnosis  Diagnoses       Z86.79 (ICD-10-CM) - History of subarachnoid hemorrhage           Treatment Diagnosis R26.81  Unsteadiness on feet  R53.1 Weakness  R26.2 Difficulty in walking   Date of Evaluation 25   Additional Pertinent History Nicci Mooney has a past medical history of Asthma, Chronic back pain, Chronic kidney disease, Colitis, GERD (gastroesophageal reflux disease), Glaucoma, Grieving, Hearing loss, History of tobacco abuse, Hyperlipidemia, and Hypertension.  she has a past surgical history that includes Hysterectomy; Tonsillectomy;  section; Breast surgery; Septoplasty (2016); turbinate resection (2016); eye surgery (20 and 21); Hysterectomy, total abdominal (); and Upper gastrointestinal endoscopy (21).     Allergies Allergies   Allergen Reactions    Keflex [Cephalexin] Hives    Theophyllines Nausea And Vomiting      Medications   Current Outpatient Medications:     montelukast (SINGULAIR) 10 MG tablet, TAKE 1 TABLET EVERY NIGHT, Disp: 90 tablet, Rfl: 3    loratadine (CLARITIN) 10 MG tablet, Take 1 tablet by mouth daily, Disp: 90 tablet, Rfl: 3    polyethylene glycol (MIRALAX) 17 g PACK packet, Take 17 g by mouth daily, Disp: , Rfl:     lisinopril-hydroCHLOROthiazide (PRINZIDE;ZESTORETIC) 20-12.5 MG per tablet, TAKE 1 TABLET EVERY DAY, Disp: 90 tablet, Rfl: 3    vitamin D 50 MCG (2000) CAPS capsule, Take 1 capsule by mouth daily, Disp: , Rfl:     Ascorbic Acid (VITAMIN C) 250 MG tablet, Take 1 tablet

## 2025-05-30 ENCOUNTER — HOSPITAL ENCOUNTER (OUTPATIENT)
Dept: PHYSICAL THERAPY | Age: 75
Setting detail: THERAPIES SERIES
Discharge: HOME | End: 2025-05-30
Payer: MEDICARE

## 2025-05-30 ENCOUNTER — HOSPITAL ENCOUNTER (OUTPATIENT)
Dept: SPEECH THERAPY | Age: 75
Setting detail: THERAPIES SERIES
Discharge: HOME | End: 2025-05-30
Payer: MEDICARE

## 2025-05-30 PROCEDURE — 97130 THER IVNTJ EA ADDL 15 MIN: CPT | Performed by: SPEECH-LANGUAGE PATHOLOGIST

## 2025-05-30 PROCEDURE — 97129 THER IVNTJ 1ST 15 MIN: CPT | Performed by: SPEECH-LANGUAGE PATHOLOGIST

## 2025-05-30 PROCEDURE — 97530 THERAPEUTIC ACTIVITIES: CPT

## 2025-05-30 NOTE — PROGRESS NOTES
[facility]  SPEECH THERAPY  [] SPEECH LANGUAGE COGNITIVE EVALUATION  [x] DAILY NOTE   [] PROGRESS NOTE [] DISCHARGE NOTE    [x] OUTPATIENT REHABILITATION CENTER [facility]   [] [facility] CARE [place]    [] [facility]   [] [facility]    Date: 2025  Patient Name:  Nicci Mooney \"Donavon\"  : 1950  MRN: [id number]  CSN: [id number]    Referring Practitioner Kristie Celaya, SONYA - CNP [phone]      Diagnosis  Diagnoses       Z86.79 (ICD-10-CM) - History of subarachnoid hemorrhage           Treatment Diagnosis R41.89 Other symptoms and signs involving cognitive functions and awareness      Date of Evaluation 25   Additional Pertinent History Nicci Mooney has a past medical history of Asthma, Chronic back pain, Chronic kidney disease, Colitis, GERD (gastroesophageal reflux disease), Glaucoma, Grieving, Hearing loss, History of tobacco abuse, Hyperlipidemia, and Hypertension.  she has a past surgical history that includes Hysterectomy; Tonsillectomy;  section; Breast surgery; Septoplasty (2016); turbinate resection (2016); eye surgery (20 and 21); Hysterectomy, total abdominal (); and Upper gastrointestinal endoscopy (21).     Allergies Allergies   Allergen Reactions    Keflex [Cephalexin] Hives    Theophyllines Nausea And Vomiting      Medications   Current Outpatient Medications:     montelukast (SINGULAIR) 10 MG tablet, TAKE 1 TABLET EVERY NIGHT, Disp: 90 tablet, Rfl: 3    loratadine (CLARITIN) 10 MG tablet, Take 1 tablet by mouth daily, Disp: 90 tablet, Rfl: 3    polyethylene glycol (MIRALAX) 17 g PACK packet, Take 17 g by mouth daily, Disp: , Rfl:     lisinopril-hydroCHLOROthiazide (PRINZIDE;ZESTORETIC) 20-12.5 MG per tablet, TAKE 1 TABLET EVERY DAY, Disp: 90 tablet, Rfl: 3    vitamin D 50 MCG ( [place]) CAPS capsule, Take 1 capsule by mouth daily, Disp: , Rfl:     Ascorbic Acid (VITAMIN C) 250 MG tablet, Take 1 tablet

## 2025-05-30 NOTE — PROGRESS NOTES
by mouth daily, Disp: , Rfl:     metoprolol succinate (TOPROL XL) 50 MG extended release tablet, Take 1 tablet by mouth daily, Disp: 90 tablet, Rfl: 0    ondansetron (ZOFRAN-ODT) 4 MG disintegrating tablet, Take 1 tablet by mouth 3 times daily as needed for Nausea or Vomiting, Disp: 21 tablet, Rfl: 0    levETIRAcetam (KEPPRA) 500 MG tablet, Take 1 tablet by mouth in the morning and 1 tablet in the evening. Do all this for 9 doses. (Patient not taking: Reported on 5/15/2025), Disp: 9 tablet, Rfl: 0    topiramate (TOPAMAX) 25 MG tablet, Take 1 tablet by mouth at bedtime for 7 days, THEN 1 tablet 2 times daily., Disp: 187 tablet, Rfl: 0    atorvastatin (LIPITOR) 10 MG tablet, TAKE 1 TABLET BY MOUTH DAILY, Disp: 90 tablet, Rfl: 3    valACYclovir (VALTREX) 1 g tablet, TAKE 1 TABLET BY MOUTH DAILY, Disp: 90 tablet, Rfl: 3    dorzolamide (TRUSOPT) 2 % ophthalmic solution, Place 1 drop into both eyes 3 times daily, Disp: , Rfl:     fluticasone (FLONASE) 50 MCG/ACT nasal spray, 2 sprays by Each Nostril route daily, Disp: 16 g, Rfl: 5    budesonide (ENTOCORT EC) 3 MG extended release capsule, , Disp: , Rfl:     Tafluprost, PF, (ZIOPTAN) 0.0015 % SOLN, Apply 1 drop to eye nightly, Disp: , Rfl:     Netarsudil Dimesylate (RHOPRESSA) 0.02 % SOLN, Apply 1 drop to eye daily Right eye, Disp: , Rfl:       Functional Outcome Measure Used LEFS / DGI   Functional Outcome Score LEFS 44/80 DGI: 12/24 (5/8/25)       Insurance: Primary: Payor: MEDICARE /  /  / ,   Secondary: [employer]   Authorization Information INSURANCE THERAPY BENEFIT:  No visit limit. Treatment must be medically necessary and must required the skill of a licensed therapist or therapist assistant.  Benefit will not cover maintenance or preventative treatment.  AQUATIC THERAPY COVERED:   Yes  MODALITIES COVERED:  Yes with the exception of iontophoresis and Hot/Cold Packs   Initial CPT Codes Requested 61837 - Therapeutic Exercise, 43365 - Neuromuscular Re-Education

## 2025-06-04 ENCOUNTER — HOSPITAL ENCOUNTER (OUTPATIENT)
Dept: PHYSICAL THERAPY | Age: 75
Setting detail: THERAPIES SERIES
Discharge: HOME | End: 2025-06-04
Payer: MEDICARE

## 2025-06-04 ENCOUNTER — HOSPITAL ENCOUNTER (OUTPATIENT)
Dept: SPEECH THERAPY | Age: 75
Setting detail: THERAPIES SERIES
Discharge: HOME | End: 2025-06-04
Payer: MEDICARE

## 2025-06-04 PROCEDURE — 97110 THERAPEUTIC EXERCISES: CPT

## 2025-06-04 PROCEDURE — 97112 NEUROMUSCULAR REEDUCATION: CPT

## 2025-06-04 PROCEDURE — 97130 THER IVNTJ EA ADDL 15 MIN: CPT | Performed by: SPEECH-LANGUAGE PATHOLOGIST

## 2025-06-04 PROCEDURE — 97129 THER IVNTJ 1ST 15 MIN: CPT | Performed by: SPEECH-LANGUAGE PATHOLOGIST

## 2025-06-04 NOTE — PROGRESS NOTES
Addended by: NISSA RASCON on: 8/26/2019 03:11 PM     Modules accepted: Orders     [facility]  PHYSICAL THERAPY  [] EVALUATION  [x] DAILY NOTE (LAND) [] DAILY NOTE (AQUATIC ) [] PROGRESS NOTE [] DISCHARGE NOTE    [x] OUTPATIENT REHABILITATION CENTER [facility]   [] [place] [facility]ATORY CARE [place]    [] [facility]   [] [facility]    Date: 2025  Patient Name:  Nicci Mooney \"Donavon\"   : 1950  MRN: [id number]  CSN: [id number]    Referring Practitioner Kristie Celaya, APRN - CNP [phone]      Diagnosis  Diagnoses       Z86.79 (ICD-10-CM) - History of subarachnoid hemorrhage           Treatment Diagnosis R26.81  Unsteadiness on feet  R53.1 Weakness  R26.2 Difficulty in walking   Date of Evaluation 25   Additional Pertinent History Nicci Mooney has a past medical history of Asthma, Chronic back pain, Chronic kidney disease, Colitis, GERD (gastroesophageal reflux disease), Glaucoma, Grieving, Hearing loss, History of tobacco abuse, Hyperlipidemia, and Hypertension.  she has a past surgical history that includes Hysterectomy; Tonsillectomy;  section; Breast surgery; Septoplasty (2016); turbinate resection (2016); eye surgery (20 and 21); Hysterectomy, total abdominal (); and Upper gastrointestinal endoscopy (21).     Allergies Allergies   Allergen Reactions    Keflex [Cephalexin] Hives    Theophyllines Nausea And Vomiting      Medications   Current Outpatient Medications:     montelukast (SINGULAIR) 10 MG tablet, TAKE 1 TABLET EVERY NIGHT, Disp: 90 tablet, Rfl: 3    loratadine (CLARITIN) 10 MG tablet, Take 1 tablet by mouth daily, Disp: 90 tablet, Rfl: 3    polyethylene glycol (MIRALAX) 17 g PACK packet, Take 17 g by mouth daily, Disp: , Rfl:     lisinopril-hydroCHLOROthiazide (PRINZIDE;ZESTORETIC) 20-12.5 MG per tablet, TAKE 1 TABLET EVERY DAY, Disp: 90 tablet, Rfl: 3    vitamin D 50 MCG (2000) CAPS capsule, Take 1 capsule by mouth daily, Disp: , Rfl:     Ascorbic Acid (VITAMIN C) 250 MG tablet, Take 1 tablet

## 2025-06-04 NOTE — PROGRESS NOTES
* PLEASE SIGN, DATE AND TIME CERTIFICATION BELOW AND RETURN TO [facility] OUTPATIENT REHABILITATION (FAX #: [phone]).  ATTEST/CO-SIGN IF ACCESSING VIA [employer].  THANK YOU.**    I certify that I have examined the patient below and determined that Physical Medicine and Rehabilitation service is necessary and that I approve the established plan of care for up to 90 days or as specifically noted.  Attestation, signature or co-signature of physician indicates approval of certification requirements.    ________________________ ____________  Physician Signature   Date     [facility]  SPEECH THERAPY  [] SPEECH LANGUAGE COGNITIVE EVALUATION  [] DAILY NOTE   [x] PROGRESS NOTE [] DISCHARGE NOTE    [x] OUTPATIENT REHABILITATION CENTER [facility]   [] [facility] CARE [place]    [] [facility]   [] [facility]    Date: 2025  Patient Name:  Nicci Mooney \"Donavon\"  : 1950  MRN: [id number]  CSN: [id number]    Referring Practitioner Kristie Celaya, SONYA - CNP [phone]      Diagnosis  Diagnoses       Z86.79 (ICD-10-CM) - History of subarachnoid hemorrhage           Treatment Diagnosis R41.89 Other symptoms and signs involving cognitive functions and awareness      Date of Evaluation 25   Additional Pertinent History Nicci Mooney has a past medical history of Asthma, Chronic back pain, Chronic kidney disease, Colitis, GERD (gastroesophageal reflux disease), Glaucoma, Grieving, Hearing loss, History of tobacco abuse, Hyperlipidemia, and Hypertension.  she has a past surgical history that includes Hysterectomy; Tonsillectomy;  section; Breast surgery; Septoplasty (2016); turbinate resection (2016); eye surgery (20 and 21); Hysterectomy, total abdominal (); and Upper gastrointestinal endoscopy (21).     Allergies Allergies   Allergen Reactions    Keflex [Cephalexin] Hives    Theophyllines Nausea And Vomiting      Medications   Current 
Detail Level: Detailed

## 2025-06-05 ENCOUNTER — OFFICE VISIT (OUTPATIENT)
Dept: FAMILY MEDICINE CLINIC | Age: 75
End: 2025-06-05
Payer: MEDICARE

## 2025-06-05 ENCOUNTER — RESULTS FOLLOW-UP (OUTPATIENT)
Dept: FAMILY MEDICINE CLINIC | Age: 75
End: 2025-06-05

## 2025-06-05 VITALS
RESPIRATION RATE: 16 BRPM | HEIGHT: 62 IN | DIASTOLIC BLOOD PRESSURE: 70 MMHG | SYSTOLIC BLOOD PRESSURE: 112 MMHG | TEMPERATURE: 97.6 F | WEIGHT: 110.6 LBS | HEART RATE: 66 BPM | BODY MASS INDEX: 20.35 KG/M2 | OXYGEN SATURATION: 99 %

## 2025-06-05 DIAGNOSIS — R33.9 URINARY RETENTION: ICD-10-CM

## 2025-06-05 DIAGNOSIS — G25.0 ESSENTIAL TREMOR: ICD-10-CM

## 2025-06-05 DIAGNOSIS — R10.2 PELVIC PAIN: Primary | ICD-10-CM

## 2025-06-05 DIAGNOSIS — I10 PRIMARY HYPERTENSION: ICD-10-CM

## 2025-06-05 LAB
BILIRUBIN, POC: NEGATIVE
BLOOD URINE, POC: NEGATIVE
CLARITY, POC: CLEAR
COLOR, POC: NORMAL
GLUCOSE URINE, POC: NEGATIVE MG/DL
KETONES, POC: NEGATIVE MG/DL
LEUKOCYTE EST, POC: NORMAL
NITRITE, POC: NEGATIVE
PH, POC: 5.5
PROTEIN, POC: NEGATIVE MG/DL
SPECIFIC GRAVITY, POC: 1.01
UROBILINOGEN, POC: 0.2 MG/DL

## 2025-06-05 PROCEDURE — 1160F RVW MEDS BY RX/DR IN RCRD: CPT | Performed by: NURSE PRACTITIONER

## 2025-06-05 PROCEDURE — 3074F SYST BP LT 130 MM HG: CPT | Performed by: NURSE PRACTITIONER

## 2025-06-05 PROCEDURE — 1036F TOBACCO NON-USER: CPT | Performed by: NURSE PRACTITIONER

## 2025-06-05 PROCEDURE — 1159F MED LIST DOCD IN RCRD: CPT | Performed by: NURSE PRACTITIONER

## 2025-06-05 PROCEDURE — G8427 DOCREV CUR MEDS BY ELIG CLIN: HCPCS | Performed by: NURSE PRACTITIONER

## 2025-06-05 PROCEDURE — G8399 PT W/DXA RESULTS DOCUMENT: HCPCS | Performed by: NURSE PRACTITIONER

## 2025-06-05 PROCEDURE — 3078F DIAST BP <80 MM HG: CPT | Performed by: NURSE PRACTITIONER

## 2025-06-05 PROCEDURE — 99214 OFFICE O/P EST MOD 30 MIN: CPT | Performed by: NURSE PRACTITIONER

## 2025-06-05 PROCEDURE — 1090F PRES/ABSN URINE INCON ASSESS: CPT | Performed by: NURSE PRACTITIONER

## 2025-06-05 PROCEDURE — G8420 CALC BMI NORM PARAMETERS: HCPCS | Performed by: NURSE PRACTITIONER

## 2025-06-05 PROCEDURE — 81003 URINALYSIS AUTO W/O SCOPE: CPT | Performed by: NURSE PRACTITIONER

## 2025-06-05 PROCEDURE — 1123F ACP DISCUSS/DSCN MKR DOCD: CPT | Performed by: NURSE PRACTITIONER

## 2025-06-05 ASSESSMENT — ENCOUNTER SYMPTOMS
COUGH: 0
DIARRHEA: 0
CHEST TIGHTNESS: 0
EYE DISCHARGE: 0
RHINORRHEA: 0
VOMITING: 0
ROS SKIN COMMENTS: DRY SKIN
ABDOMINAL PAIN: 0
SHORTNESS OF BREATH: 0
CONSTIPATION: 0

## 2025-06-05 NOTE — PROGRESS NOTES
Nicci Mooney (:  1950) is a 74 y.o. female, Established patient, here for evaluation of the following chief complaint(s):  Follow-up Chronic Condition (3 month follow up), Eye surgery ( of this month, eye are getting worse ), and Pain (Pain all the time when she sits. Feels like she is sitting on bone Had an impacted bowel used a laxative and it helped but everything feels not normal. Has been bad since May the 12. She said something is going on and it is wrong. She notes she is still doing therapy and it is going well )         Assessment & Plan  1. Pelvic pain.  - Persistent pelvic pain affecting both the front and back, described as feeling like sitting on a bone, began after an episode of impacted bowel on 2025.  - Physical therapy exercises have not alleviated the symptoms.  - Differential diagnoses include arthritis due to a previous injury or a urinary tract infection (UTI). An x-ray of the pelvis will be ordered to investigate further. A point-of-care urinalysis will also be conducted to rule out UTI.  - If the results indicate any abnormalities, further investigations such as bladder scans may be necessary.    2. Hypertension.  - Blood pressure is well-controlled at 112/70 mmHg despite the ongoing pain.  - Currently on a combination of lisinopril, hydrochlorothiazide, and Toprol.  - Continue current medication regimen.    3. Tremors.  - On Topamax for tremor management, which has shown improvement since a recent fall.  - Continue taking Topamax.    4. Weight loss.  - Significant weight loss, now weighing 107 pounds.  - Advised to maintain a high-protein diet, especially considering ongoing physical therapy.  - Blood work, including thyroid function tests, lipid panel, CBC, and CMP, will be ordered before the next follow-up in 2025 to monitor for any underlying issues contributing to the weight loss.    Follow-up  - Follow up in 2025.    Results    1. Pelvic pain  -     POCT

## 2025-06-06 ENCOUNTER — APPOINTMENT (OUTPATIENT)
Dept: SPEECH THERAPY | Age: 75
End: 2025-06-06
Payer: MEDICARE

## 2025-06-08 LAB
BACTERIA UR CULT: ABNORMAL
ORGANISM: ABNORMAL

## 2025-06-12 ENCOUNTER — HOSPITAL ENCOUNTER (OUTPATIENT)
Dept: MRI IMAGING | Age: 75
Discharge: HOME | End: 2025-06-12
Attending: NURSE PRACTITIONER
Payer: MEDICARE

## 2025-06-12 DIAGNOSIS — E78.2 MIXED HYPERLIPIDEMIA: ICD-10-CM

## 2025-06-12 DIAGNOSIS — I60.9 SAH (SUBARACHNOID HEMORRHAGE) (HCC): ICD-10-CM

## 2025-06-12 DIAGNOSIS — S06.5XAA SDH (SUBDURAL HEMATOMA) (HCC): ICD-10-CM

## 2025-06-12 PROCEDURE — 70551 MRI BRAIN STEM W/O DYE: CPT

## 2025-06-12 RX ORDER — ATORVASTATIN CALCIUM 10 MG/1
10 TABLET, FILM COATED ORAL DAILY
Qty: 90 TABLET | Refills: 3 | Status: SHIPPED | OUTPATIENT
Start: 2025-06-12

## 2025-06-13 ENCOUNTER — OFFICE VISIT (OUTPATIENT)
Dept: NEUROSURGERY | Age: 75
End: 2025-06-13
Payer: MEDICARE

## 2025-06-13 VITALS
HEIGHT: 62 IN | HEART RATE: 55 BPM | WEIGHT: 110 LBS | BODY MASS INDEX: 20.24 KG/M2 | DIASTOLIC BLOOD PRESSURE: 61 MMHG | SYSTOLIC BLOOD PRESSURE: 133 MMHG

## 2025-06-13 DIAGNOSIS — I60.9 SAH (SUBARACHNOID HEMORRHAGE) (HCC): Primary | ICD-10-CM

## 2025-06-13 DIAGNOSIS — S06.5XAA SDH (SUBDURAL HEMATOMA) (HCC): ICD-10-CM

## 2025-06-13 DIAGNOSIS — G44.309 POST-TRAUMATIC HEADACHE, NOT INTRACTABLE, UNSPECIFIED CHRONICITY PATTERN: ICD-10-CM

## 2025-06-13 PROCEDURE — 99214 OFFICE O/P EST MOD 30 MIN: CPT | Performed by: NURSE PRACTITIONER

## 2025-06-13 PROCEDURE — 1123F ACP DISCUSS/DSCN MKR DOCD: CPT | Performed by: NURSE PRACTITIONER

## 2025-06-13 PROCEDURE — G8427 DOCREV CUR MEDS BY ELIG CLIN: HCPCS | Performed by: NURSE PRACTITIONER

## 2025-06-13 PROCEDURE — G8420 CALC BMI NORM PARAMETERS: HCPCS | Performed by: NURSE PRACTITIONER

## 2025-06-13 PROCEDURE — 1090F PRES/ABSN URINE INCON ASSESS: CPT | Performed by: NURSE PRACTITIONER

## 2025-06-13 PROCEDURE — 3078F DIAST BP <80 MM HG: CPT | Performed by: NURSE PRACTITIONER

## 2025-06-13 PROCEDURE — 3075F SYST BP GE 130 - 139MM HG: CPT | Performed by: NURSE PRACTITIONER

## 2025-06-13 PROCEDURE — 1036F TOBACCO NON-USER: CPT | Performed by: NURSE PRACTITIONER

## 2025-06-13 PROCEDURE — 1159F MED LIST DOCD IN RCRD: CPT | Performed by: NURSE PRACTITIONER

## 2025-06-13 PROCEDURE — G8399 PT W/DXA RESULTS DOCUMENT: HCPCS | Performed by: NURSE PRACTITIONER

## 2025-06-13 ASSESSMENT — ENCOUNTER SYMPTOMS
COLOR CHANGE: 0
TROUBLE SWALLOWING: 0
CHOKING: 0
ABDOMINAL DISTENTION: 0
WHEEZING: 0
PHOTOPHOBIA: 0
COUGH: 0
CHEST TIGHTNESS: 0
VOICE CHANGE: 0
VOMITING: 0
BACK PAIN: 0
NAUSEA: 0
SHORTNESS OF BREATH: 0

## 2025-06-13 NOTE — PROGRESS NOTES
Cranial Nerves  II  visual fields full to  confrontation  III  Pupils 3mm, equal, round, reactive to light  IV  no superior oblique palsy  V  facial sensation normal  VI  no lateral rectus palsy  VII  face symmetric   VIII  intact to finger rub   IX  no loss of pharyngeal sensation   X  palate raises symmetrically, uvula midline, no dysarthria  XI  sternocleidomastoid and trapexius 5/5 bilaterally  XII  no tongue deviation  Language: 0 - no aphasia, normal      Radiological Findings:       Imaging:      Xray:    Head CT without contrast completed on 5/9/2025                MRI:  MRI brain 6/13/2025 without contrast                    Assessment:     Diagnosis Orders   1. SAH (subarachnoid hemorrhage) (HCC)  CT HEAD WO CONTRAST      2. SDH (subdural hematoma) (HCC)        3. Post-traumatic headache, not intractable, unspecified chronicity pattern              Plan:     I have evaluated Nicci today and have discussed results of the MRI of the brain and have reviewed imaging with her personally.  I have discussed there is improvement of her subarachnoid hemorrhage with residual small areas in which we will continue to monitor her with a repeat head CT in 1 month.  She is in agreement with this plan.  I have asked her if any concerning symptoms or acute onset of neurological deficits including headache, dizziness, visual changes from the chronic vision changes she has to call office or proceed to the ER.  She acknowledges and understands.  Will follow back up in 1 month with a repeat head CT             Electronically signed by SONYA Bradley CNP on 6/13/2025 at 11:50 AM

## 2025-06-17 ENCOUNTER — HOSPITAL ENCOUNTER (OUTPATIENT)
Dept: SPEECH THERAPY | Age: 75
Setting detail: THERAPIES SERIES
Discharge: HOME | End: 2025-06-17
Payer: MEDICARE

## 2025-06-17 ENCOUNTER — HOSPITAL ENCOUNTER (OUTPATIENT)
Dept: PHYSICAL THERAPY | Age: 75
Setting detail: THERAPIES SERIES
Discharge: HOME | End: 2025-06-17
Payer: MEDICARE

## 2025-06-17 ENCOUNTER — APPOINTMENT (OUTPATIENT)
Dept: PHYSICAL THERAPY | Age: 75
End: 2025-06-17
Payer: MEDICARE

## 2025-06-17 PROCEDURE — 97130 THER IVNTJ EA ADDL 15 MIN: CPT | Performed by: SPEECH-LANGUAGE PATHOLOGIST

## 2025-06-17 PROCEDURE — 97110 THERAPEUTIC EXERCISES: CPT

## 2025-06-17 PROCEDURE — 97112 NEUROMUSCULAR REEDUCATION: CPT

## 2025-06-17 PROCEDURE — 97129 THER IVNTJ 1ST 15 MIN: CPT | Performed by: SPEECH-LANGUAGE PATHOLOGIST

## 2025-06-17 NOTE — PROGRESS NOTES
weekend and the beginning of this week and when she used her credit card.  Patient reports the last time that she used it was when she got groceries. The patient reports she has not canceled her credit card yet, because she thinks she might find it yet.  Patient reports the next step that she needs to take is call the credit card company and ask them what the last charge was that was made on her card.  Patient reports she may have to put the card on hold or cancel it.  This SLP then asked the patient if she has any bills that are automatically paid with her credit card and she said she does have a couple. Reviewed with the patient that she will need to figure out another way to pay those bills if she puts her credit card on hold or cancels it. Patient verbalized understanding of the above and helped figure out the steps to problem solve this situation.    SHORT TERM GOAL #2: Patient will complete executive functioning tasks (medication management, Finance management, scheduling, etc) with 80% accuracy without cues to improve overall ability to return to independent completion of ADLs and IADLS.  INTERVENTIONS: Patient reports she was able to reconcile her checkbook after the last session so it matched her bank statement.  She reports she identified that she put 2 of the charges in 2 times on accident. Patient reports since she has quite a few payments that are automatically withdrawn from her account, she wrote them in right away and then she accidentally wrote one of them in a second time.      Checkbook task:  Patient was provided with x 7 payments and 1 deposit not in chronological order. Patient was instructed to organize the information into a check ledger by date and then put the rest of the information in the check ledger and then balance it.  Patient organized the payments/deposit correctly according to the date.  Patient forgot to put the payment amount in the payment column for one of the payments, but she

## 2025-06-17 NOTE — PROGRESS NOTES
<=10s to decrease fall risk.    Long Term Goals: 8 weeks  Pt to improve LEFS from 44/80 to >=54/80 for decreased functional limitations.  Pt to improve DGI from 12/24 to >=20/24 for decreased fall risk.   Pt to improve 5x STS from 12.8s to <=10s to decrease fall risk.          Patient Education:   [x]  HEP/Education Completed: body mechanics, use of AD, updated HEP  [employer] Access Code: [email or web address]    []  No new Education completed  []  Reviewed Prior HEP      [x]  Patient verbalized and/or demonstrated understanding of education provided.  []  Patient unable to verbalize and/or demonstrate understanding of education provided.  Will continue education.  []  Barriers to learning:     PLAN:  Treatment Recommendations: Strengthening, Balance Training, Functional Mobility Training, Transfer Training, Endurance Training, Gait Training, Stair Training, Neuromuscular Re-education, Manual Therapy - Soft Tissue Mobilization, Pain Management, Home Exercise Program, Patient Education, and Modalities    []  Plan of care initiated.  Plan to see patient 2 times per week for 8 weeks to address the treatment planned outlined above.  [x]  Continue with current plan of care  []  Modify plan of care as follows:    []  Hold pending physician visit  []  Discharge    Time In 1230   Time Out 1310   Timed Code Minutes: 40   Total Treatment Time: 40       Electronically Signed by: Giovanny Rowan PTA

## 2025-06-19 ENCOUNTER — HOSPITAL ENCOUNTER (OUTPATIENT)
Dept: SPEECH THERAPY | Age: 75
Setting detail: THERAPIES SERIES
Discharge: HOME | End: 2025-06-19
Payer: MEDICARE

## 2025-06-19 ENCOUNTER — HOSPITAL ENCOUNTER (OUTPATIENT)
Dept: PHYSICAL THERAPY | Age: 75
Setting detail: THERAPIES SERIES
Discharge: HOME | End: 2025-06-19
Payer: MEDICARE

## 2025-06-19 PROCEDURE — 97110 THERAPEUTIC EXERCISES: CPT

## 2025-06-19 PROCEDURE — 97130 THER IVNTJ EA ADDL 15 MIN: CPT | Performed by: SPEECH-LANGUAGE PATHOLOGIST

## 2025-06-19 PROCEDURE — 97129 THER IVNTJ 1ST 15 MIN: CPT | Performed by: SPEECH-LANGUAGE PATHOLOGIST

## 2025-06-19 NOTE — DISCHARGE SUMMARY
[facility]  PHYSICAL THERAPY  [] EVALUATION  [] DAILY NOTE (LAND) [] DAILY NOTE (AQUATIC ) [] PROGRESS NOTE [x] DISCHARGE NOTE    [x] OUTPATIENT REHABILITATION [facility]   [] [place] [facility]AT[facility] CARE [place]    [] [facility]   [] [facility]    Date: 2025  Patient Name:  Nicci Mooney \"Donavon\"   : 1950  MRN: [id number]  CSN: [id number]    Referring Practitioner Kristie Celaya, APRN - CNP [phone]      Diagnosis  Diagnoses       Z86.79 (ICD-10-CM) - History of subarachnoid hemorrhage           Treatment Diagnosis R26.81  Unsteadiness on feet  R53.1 Weakness  R26.2 Difficulty in walking   Date of Evaluation 25   Additional Pertinent History Nicci Mooney has a past medical history of Asthma, Chronic back pain, Chronic kidney disease, Colitis, GERD (gastroesophageal reflux disease), Glaucoma, Grieving, Hearing loss, History of tobacco abuse, Hyperlipidemia, and Hypertension.  she has a past surgical history that includes Hysterectomy; Tonsillectomy;  section; Breast surgery; Septoplasty (2016); turbinate resection (2016); eye surgery (20 and 21); Hysterectomy, total abdominal (); and Upper gastrointestinal endoscopy (21).     Allergies Allergies   Allergen Reactions    Keflex [Cephalexin] Hives    Theophyllines Nausea And Vomiting      Medications   Current Outpatient Medications:     atorvastatin (LIPITOR) 10 MG tablet, TAKE 1 TABLET EVERY DAY, Disp: 90 tablet, Rfl: 3    montelukast (SINGULAIR) 10 MG tablet, TAKE 1 TABLET EVERY NIGHT, Disp: 90 tablet, Rfl: 3    loratadine (CLARITIN) 10 MG tablet, Take 1 tablet by mouth daily, Disp: 90 tablet, Rfl: 3    polyethylene glycol (MIRALAX) 17 g PACK packet, Take 17 g by mouth daily, Disp: , Rfl:     lisinopril-hydroCHLOROthiazide (PRINZIDE;ZESTORETIC) 20-12.5 MG per tablet, TAKE 1 TABLET EVERY DAY, Disp: 90 tablet, Rfl: 3    vitamin D 50 MCG ( UT) CAPS capsule, Take 1

## 2025-06-19 NOTE — THERAPY DISCHARGE
[facility]  SPEECH THERAPY  [] SPEECH LANGUAGE COGNITIVE EVALUATION  [] DAILY NOTE   [] PROGRESS NOTE [x] DISCHARGE NOTE    [x] OUTPATIENT REHABILITATION CENTER [facility]   [] [facility] CARE [place]    [] [facility]   [] [facility]    Date: 2025  Patient Name:  Nicci Mooney \"Donavon\"  : 1950  MRN: [id number]  CSN: [id number]    Referring Practitioner Kristie Celaya, SONYA - CNP [phone]      Diagnosis  Diagnoses       Z86.79 (ICD-10-CM) - History of subarachnoid hemorrhage           Treatment Diagnosis R41.89 Other symptoms and signs involving cognitive functions and awareness      Date of Evaluation 25   Additional Pertinent History Nicci Mooney has a past medical history of Asthma, Chronic back pain, Chronic kidney disease, Colitis, GERD (gastroesophageal reflux disease), Glaucoma, Grieving, Hearing loss, History of tobacco abuse, Hyperlipidemia, and Hypertension.  she has a past surgical history that includes Hysterectomy; Tonsillectomy;  section; Breast surgery; Septoplasty (2016); turbinate resection (2016); eye surgery (20 and 21); Hysterectomy, total abdominal (); and Upper gastrointestinal endoscopy (21).     Allergies Allergies   Allergen Reactions    Keflex [Cephalexin] Hives    Theophyllines Nausea And Vomiting      Medications   Current Outpatient Medications:     atorvastatin (LIPITOR) 10 MG tablet, TAKE 1 TABLET EVERY DAY, Disp: 90 tablet, Rfl: 3    montelukast (SINGULAIR) 10 MG tablet, TAKE 1 TABLET EVERY NIGHT, Disp: 90 tablet, Rfl: 3    loratadine (CLARITIN) 10 MG tablet, Take 1 tablet by mouth daily, Disp: 90 tablet, Rfl: 3    polyethylene glycol (MIRALAX) 17 g PACK packet, Take 17 g by mouth daily, Disp: , Rfl:     lisinopril-hydroCHLOROthiazide (PRINZIDE;ZESTORETIC) 20-12.5 MG per tablet, TAKE 1 TABLET EVERY DAY, Disp: 90 tablet, Rfl: 3    vitamin D 50 MCG (2000) CAPS capsule, Take 1 capsule

## 2025-06-20 DIAGNOSIS — G25.0 ESSENTIAL TREMOR: ICD-10-CM

## 2025-06-23 RX ORDER — TOPIRAMATE 25 MG/1
TABLET, FILM COATED ORAL
Qty: 173 TABLET | Refills: 3 | Status: SHIPPED | OUTPATIENT
Start: 2025-06-23 | End: 2025-09-28

## 2025-07-14 ENCOUNTER — HOSPITAL ENCOUNTER (OUTPATIENT)
Dept: CT IMAGING | Age: 75
Discharge: HOME | End: 2025-07-14
Attending: NURSE PRACTITIONER
Payer: MEDICARE

## 2025-07-14 DIAGNOSIS — I60.9 SAH (SUBARACHNOID HEMORRHAGE) (HCC): ICD-10-CM

## 2025-07-14 PROCEDURE — 70450 CT HEAD/BRAIN W/O DYE: CPT

## 2025-07-16 ENCOUNTER — OFFICE VISIT (OUTPATIENT)
Dept: NEUROSURGERY | Age: 75
End: 2025-07-16
Payer: MEDICARE

## 2025-07-16 VITALS
HEART RATE: 56 BPM | WEIGHT: 107 LBS | SYSTOLIC BLOOD PRESSURE: 122 MMHG | DIASTOLIC BLOOD PRESSURE: 63 MMHG | BODY MASS INDEX: 19.69 KG/M2 | HEIGHT: 62 IN

## 2025-07-16 DIAGNOSIS — I60.9 SAH (SUBARACHNOID HEMORRHAGE) (HCC): Primary | ICD-10-CM

## 2025-07-16 DIAGNOSIS — G44.309 POST-TRAUMATIC HEADACHE, NOT INTRACTABLE, UNSPECIFIED CHRONICITY PATTERN: ICD-10-CM

## 2025-07-16 DIAGNOSIS — S06.5XAA SDH (SUBDURAL HEMATOMA) (HCC): ICD-10-CM

## 2025-07-16 PROCEDURE — G8420 CALC BMI NORM PARAMETERS: HCPCS | Performed by: NURSE PRACTITIONER

## 2025-07-16 PROCEDURE — 1036F TOBACCO NON-USER: CPT | Performed by: NURSE PRACTITIONER

## 2025-07-16 PROCEDURE — G8399 PT W/DXA RESULTS DOCUMENT: HCPCS | Performed by: NURSE PRACTITIONER

## 2025-07-16 PROCEDURE — G8427 DOCREV CUR MEDS BY ELIG CLIN: HCPCS | Performed by: NURSE PRACTITIONER

## 2025-07-16 PROCEDURE — 3078F DIAST BP <80 MM HG: CPT | Performed by: NURSE PRACTITIONER

## 2025-07-16 PROCEDURE — 1123F ACP DISCUSS/DSCN MKR DOCD: CPT | Performed by: NURSE PRACTITIONER

## 2025-07-16 PROCEDURE — 3074F SYST BP LT 130 MM HG: CPT | Performed by: NURSE PRACTITIONER

## 2025-07-16 PROCEDURE — 99214 OFFICE O/P EST MOD 30 MIN: CPT | Performed by: NURSE PRACTITIONER

## 2025-07-16 PROCEDURE — 1159F MED LIST DOCD IN RCRD: CPT | Performed by: NURSE PRACTITIONER

## 2025-07-16 PROCEDURE — 1090F PRES/ABSN URINE INCON ASSESS: CPT | Performed by: NURSE PRACTITIONER

## 2025-07-16 ASSESSMENT — ENCOUNTER SYMPTOMS
CHOKING: 0
WHEEZING: 0
VOICE CHANGE: 0
COUGH: 0
TROUBLE SWALLOWING: 0
ABDOMINAL DISTENTION: 0
BACK PAIN: 0
NAUSEA: 0
CHEST TIGHTNESS: 0
VOMITING: 0
PHOTOPHOBIA: 0
SHORTNESS OF BREATH: 0
COLOR CHANGE: 0

## 2025-07-16 NOTE — PROGRESS NOTES
strength: No pronator drift    Coordination: intact finger to nose normal bilaterally   Sensation: Normal sensation with soft touch over her upper extremities along with V1 V2 and V3  Skin: No rashes or nodules noted.  There is a noted a left occipital hematoma with evidence of scabbing from a previous laceration  Psych: mood/affect:happy,   Recent and remote memory: intact  Mental Status: Alert, oriented, thought content appropriate  Fund of Knowledge: appropriate to recall current events   Attention: Awake cooperative and able to focus on tasks   Cranial Nerves  II  visual fields full to  confrontation  III  Pupils 3mm, equal, round, reactive to light  IV  no superior oblique palsy  V  facial sensation normal  VI  no lateral rectus palsy  VII  face symmetric   VIII  intact to finger rub   IX  no loss of pharyngeal sensation   X  palate raises symmetrically, uvula midline, no dysarthria  XI  sternocleidomastoid and trapexius 5/5 bilaterally  XII  no tongue deviation  Language: 0 - no aphasia, normal      Radiological Findings:       Imaging:      Xray:    Head CT without contrast completed on 5/9/2025                MRI:  MRI brain 6/13/2025 without contrast                    Assessment:     Diagnosis Orders   1. SAH (subarachnoid hemorrhage) (HCC)        2. SDH (subdural hematoma) (HCC)        3. Post-traumatic headache, not intractable, unspecified chronicity pattern                Plan:     I have evaluated Nicci and have reviewed her updated imaging today which does show resolution of her subarachnoid and subdural hematomas.  She denies any neurological changes and is neurologically intact.  At this time neurosurgery will follow her on an as needed basis given the recent CT scan indicating resolution of her bleeds.  From neurosurgery standpoint patient is okay to proceed with cataract surgery.  This was discussed in detail on her evaluation.  She has agreed with this plan.                 Electronically signed

## 2025-07-28 DIAGNOSIS — B00.1 RECURRENT HERPES LABIALIS: ICD-10-CM

## 2025-07-28 RX ORDER — VALACYCLOVIR HYDROCHLORIDE 1 G/1
1000 TABLET, FILM COATED ORAL DAILY
Qty: 90 TABLET | Refills: 3 | Status: SHIPPED | OUTPATIENT
Start: 2025-07-28

## 2025-09-02 ENCOUNTER — LAB (OUTPATIENT)
Dept: FAMILY MEDICINE CLINIC | Age: 75
End: 2025-09-02
Payer: MEDICARE

## 2025-09-02 DIAGNOSIS — E78.2 MIXED HYPERLIPIDEMIA: Primary | ICD-10-CM

## 2025-09-02 PROCEDURE — 36415 COLL VENOUS BLD VENIPUNCTURE: CPT | Performed by: NURSE PRACTITIONER

## 2025-09-07 PROBLEM — I61.5 INTRAVENTRICULAR HEMORRHAGE (HCC): Status: RESOLVED | Noted: 2025-04-30 | Resolved: 2025-09-07

## 2025-09-07 PROBLEM — Z86.73 HISTORY OF STROKE: Status: ACTIVE | Noted: 2025-04-29
